# Patient Record
Sex: MALE | Race: WHITE | NOT HISPANIC OR LATINO
[De-identification: names, ages, dates, MRNs, and addresses within clinical notes are randomized per-mention and may not be internally consistent; named-entity substitution may affect disease eponyms.]

---

## 2017-01-04 ENCOUNTER — APPOINTMENT (OUTPATIENT)
Age: 37
End: 2017-01-04

## 2017-01-04 VITALS
HEART RATE: 68 BPM | DIASTOLIC BLOOD PRESSURE: 68 MMHG | HEIGHT: 60 IN | TEMPERATURE: 97.4 F | SYSTOLIC BLOOD PRESSURE: 120 MMHG | BODY MASS INDEX: 43.59 KG/M2 | WEIGHT: 222 LBS

## 2017-01-17 ENCOUNTER — APPOINTMENT (OUTPATIENT)
Dept: PODIATRY | Facility: HOSPITAL | Age: 37
End: 2017-01-17

## 2017-02-01 ENCOUNTER — APPOINTMENT (OUTPATIENT)
Age: 37
End: 2017-02-01

## 2017-03-15 ENCOUNTER — APPOINTMENT (OUTPATIENT)
Age: 37
End: 2017-03-15

## 2017-03-15 VITALS — BODY MASS INDEX: 43 KG/M2 | WEIGHT: 219 LBS | HEIGHT: 60 IN

## 2017-03-28 ENCOUNTER — APPOINTMENT (OUTPATIENT)
Dept: PODIATRY | Facility: HOSPITAL | Age: 37
End: 2017-03-28

## 2017-04-03 ENCOUNTER — APPOINTMENT (OUTPATIENT)
Age: 37
End: 2017-04-03

## 2017-04-03 VITALS
WEIGHT: 215 LBS | DIASTOLIC BLOOD PRESSURE: 80 MMHG | BODY MASS INDEX: 42.21 KG/M2 | HEIGHT: 60 IN | TEMPERATURE: 97.8 F | SYSTOLIC BLOOD PRESSURE: 108 MMHG | HEART RATE: 88 BPM

## 2017-04-10 LAB
ALBUMIN SERPL-MCNC: 3.8 G/DL
ALBUMIN/GLOB SERPL: 1.03
ALP SERPL-CCNC: 72 IU/L
ALT SERPL-CCNC: 50 IU/L
ANION GAP SERPL CALC-SCNC: 6 MMOL/L
AST SERPL-CCNC: 32 IU/L
BASOPHILS # BLD: 0.06 TH/MM3
BASOPHILS NFR BLD: 2.4 %
BILIRUB SERPL-MCNC: 0.5 MG/DL
BUN SERPL-MCNC: 15 MG/DL
BUN/CREAT SERPL: 16.7 %
CALCIUM SERPL-MCNC: 9.2 MG/DL
CHLORIDE SERPL-SCNC: 106 MMOL/L
CHOLEST SERPL-MCNC: 183 MG/DL
CO2 SERPL-SCNC: 29 MMOL/L
CREAT SERPL-MCNC: 0.9 MG/DL
EOSINOPHIL # BLD: 0.06 TH/MM3
EOSINOPHIL NFR BLD: 2.4 %
ERYTHROCYTE [DISTWIDTH] IN BLOOD BY AUTOMATED COUNT: 16.9 %
GFR SERPL CREATININE-BSD FRML MDRD: 95
GLUCOSE SERPL-MCNC: 92 MG/DL
GRANULOCYTES # BLD: 1.11 TH/MM3
GRANULOCYTES NFR BLD: 44.2 %
HCT VFR BLD AUTO: 49.8 %
HDLC SERPL-MCNC: 40 MG/DL
HDLC SERPL: 4.6
HGB BLD-MCNC: 16.2 G/DL
IMM GRANULOCYTES # BLD: 0.01 TH/MM3
IMM GRANULOCYTES NFR BLD: 0.4 %
LDLC SERPL DIRECT ASSAY-MCNC: 107 MG/DL
LYMPHOCYTES # BLD: 0.98 TH/MM3
LYMPHOCYTES NFR BLD: 39 %
MCH RBC QN AUTO: 28.5 PG
MCHC RBC AUTO-ENTMCNC: 32.5 G/DL
MCV RBC AUTO: 87.7 FL
MONOCYTES # BLD: 0.29 TH/MM3
MONOCYTES NFR BLD: 11.6 %
PLATELET # BLD: 241 TH/MM3
PMV BLD AUTO: 10.1 FL
POTASSIUM SERPL-SCNC: 4.6 MMOL/L
PROT SERPL-MCNC: 7.5 G/DL
RBC # BLD AUTO: 5.68 ML/MM3
SODIUM SERPL-SCNC: 141 MMOL/L
TRIGL SERPL-MCNC: 241 MG/DL
VLDLC SERPL-MCNC: 48 MG/DL
WBC # BLD: 2.51 TH/MM3

## 2017-04-12 LAB
25(OH)D3 SERPL-MCNC: 26 NG/ML
VITAMIN D2 SERPL-MCNC: 20 NG/ML
VITAMIN D3 SERPL-MCNC: 6 NG/ML

## 2017-05-24 ENCOUNTER — APPOINTMENT (OUTPATIENT)
Age: 37
End: 2017-05-24

## 2017-05-24 VITALS — HEIGHT: 60 IN | BODY MASS INDEX: 42.21 KG/M2 | WEIGHT: 215 LBS

## 2017-05-30 ENCOUNTER — RX RENEWAL (OUTPATIENT)
Age: 37
End: 2017-05-30

## 2017-06-16 ENCOUNTER — APPOINTMENT (OUTPATIENT)
Dept: GASTROENTEROLOGY | Facility: CLINIC | Age: 37
End: 2017-06-16

## 2017-06-16 ENCOUNTER — OUTPATIENT (OUTPATIENT)
Dept: OUTPATIENT SERVICES | Facility: HOSPITAL | Age: 37
LOS: 1 days | Discharge: HOME | End: 2017-06-16

## 2017-06-20 ENCOUNTER — APPOINTMENT (OUTPATIENT)
Dept: PODIATRY | Facility: HOSPITAL | Age: 37
End: 2017-06-20

## 2017-06-20 ENCOUNTER — OUTPATIENT (OUTPATIENT)
Dept: OUTPATIENT SERVICES | Facility: HOSPITAL | Age: 37
LOS: 1 days | Discharge: HOME | End: 2017-06-20

## 2017-06-26 ENCOUNTER — RX RENEWAL (OUTPATIENT)
Age: 37
End: 2017-06-26

## 2017-06-28 DIAGNOSIS — M79.674 PAIN IN RIGHT TOE(S): ICD-10-CM

## 2017-06-28 DIAGNOSIS — K62.5 HEMORRHAGE OF ANUS AND RECTUM: ICD-10-CM

## 2017-06-28 DIAGNOSIS — F79 UNSPECIFIED INTELLECTUAL DISABILITIES: ICD-10-CM

## 2017-06-28 DIAGNOSIS — M79.675 PAIN IN LEFT TOE(S): ICD-10-CM

## 2017-06-28 DIAGNOSIS — B35.1 TINEA UNGUIUM: ICD-10-CM

## 2017-07-05 ENCOUNTER — APPOINTMENT (OUTPATIENT)
Age: 37
End: 2017-07-05

## 2017-07-05 VITALS — WEIGHT: 216 LBS | BODY MASS INDEX: 42.41 KG/M2 | HEIGHT: 60 IN

## 2017-07-10 ENCOUNTER — APPOINTMENT (OUTPATIENT)
Age: 37
End: 2017-07-10

## 2017-07-19 ENCOUNTER — APPOINTMENT (OUTPATIENT)
Age: 37
End: 2017-07-19

## 2017-07-19 ENCOUNTER — OUTPATIENT (OUTPATIENT)
Dept: OUTPATIENT SERVICES | Facility: HOSPITAL | Age: 37
LOS: 1 days | Discharge: HOME | End: 2017-07-19

## 2017-07-19 VITALS
WEIGHT: 217 LBS | BODY MASS INDEX: 42.6 KG/M2 | HEIGHT: 60 IN | TEMPERATURE: 98.7 F | SYSTOLIC BLOOD PRESSURE: 100 MMHG | DIASTOLIC BLOOD PRESSURE: 64 MMHG | HEART RATE: 72 BPM

## 2017-07-19 DIAGNOSIS — E66.9 OBESITY, UNSPECIFIED: ICD-10-CM

## 2017-07-19 DIAGNOSIS — E78.5 HYPERLIPIDEMIA, UNSPECIFIED: ICD-10-CM

## 2017-07-19 DIAGNOSIS — R76.11 NONSPECIFIC REACTION TO TUBERCULIN SKIN TEST WITHOUT ACTIVE TUBERCULOSIS: ICD-10-CM

## 2017-07-19 DIAGNOSIS — K62.5 HEMORRHAGE OF ANUS AND RECTUM: ICD-10-CM

## 2017-07-19 DIAGNOSIS — E55.9 VITAMIN D DEFICIENCY, UNSPECIFIED: ICD-10-CM

## 2017-07-19 DIAGNOSIS — R79.89 OTHER SPECIFIED ABNORMAL FINDINGS OF BLOOD CHEMISTRY: ICD-10-CM

## 2017-07-19 DIAGNOSIS — Z00.00 ENCOUNTER FOR GENERAL ADULT MEDICAL EXAMINATION WITHOUT ABNORMAL FINDINGS: ICD-10-CM

## 2017-07-20 LAB
ALBUMIN SERPL-MCNC: 3.8 G/DL
ALBUMIN/GLOB SERPL: 1.09
ALP SERPL-CCNC: 90 IU/L
ALT SERPL-CCNC: 60 IU/L
ANION GAP SERPL CALC-SCNC: 7 MMOL/L
AST SERPL-CCNC: 28 IU/L
BASOPHILS # BLD: 0.06 TH/MM3
BASOPHILS NFR BLD: 2.1 %
BILIRUB SERPL-MCNC: 0.8 MG/DL
BUN SERPL-MCNC: 13 MG/DL
BUN/CREAT SERPL: 13.5 %
CALCIUM SERPL-MCNC: 9.1 MG/DL
CHLORIDE SERPL-SCNC: 102 MMOL/L
CHOLEST SERPL-MCNC: 184 MG/DL
CO2 SERPL-SCNC: 29 MMOL/L
CREAT SERPL-MCNC: 0.96 MG/DL
EOSINOPHIL # BLD: 0.07 TH/MM3
EOSINOPHIL NFR BLD: 2.4 %
ERYTHROCYTE [DISTWIDTH] IN BLOOD BY AUTOMATED COUNT: 16.7 %
ESTIMATED AVERAGE GLUCOSE (SOUTH): 117 MG/DL
GFR SERPL CREATININE-BSD FRML MDRD: 89
GLUCOSE SERPL-MCNC: 94 MG/DL
GRANULOCYTES # BLD: 1.38 TH/MM3
GRANULOCYTES NFR BLD: 47.5 %
HBA1C MFR BLD: 5.7 %
HCT VFR BLD AUTO: 49.3 %
HDLC SERPL-MCNC: 41 MG/DL
HDLC SERPL: 4.5
HGB BLD-MCNC: 16.3 G/DL
IMM GRANULOCYTES # BLD: 0.01 TH/MM3
IMM GRANULOCYTES NFR BLD: 0.3 %
LDLC SERPL DIRECT ASSAY-MCNC: 115 MG/DL
LYMPHOCYTES # BLD: 1.04 TH/MM3
LYMPHOCYTES NFR BLD: 35.7 %
MCH RBC QN AUTO: 29.1 PG
MCHC RBC AUTO-ENTMCNC: 33.1 G/DL
MCV RBC AUTO: 87.9 FL
MONOCYTES # BLD: 0.35 TH/MM3
MONOCYTES NFR BLD: 12 %
PLATELET # BLD: 230 TH/MM3
PMV BLD AUTO: 9.9 FL
POTASSIUM SERPL-SCNC: 4.3 MMOL/L
PROT SERPL-MCNC: 7.3 G/DL
RBC # BLD AUTO: 5.61 ML/MM3
SODIUM SERPL-SCNC: 138 MMOL/L
SUSPECT FLAGS (SOUTH): PRESENT
TRIGL SERPL-MCNC: 224 MG/DL
VLDLC SERPL-MCNC: 44 MG/DL
WBC # BLD: 2.91 TH/MM3

## 2017-07-21 LAB — AFP-TM SERPL-MCNC: 4 NG/ML

## 2017-07-24 LAB
25(OH)D3 SERPL-MCNC: 28 NG/ML
VITAMIN D2 SERPL-MCNC: 17 NG/ML
VITAMIN D3 SERPL-MCNC: 11 NG/ML

## 2017-07-25 LAB — THYROID STIM HORM-HS 3RD GEN (SOUTH): 1.37 UIU/ML

## 2017-08-01 ENCOUNTER — OUTPATIENT (OUTPATIENT)
Dept: OUTPATIENT SERVICES | Facility: HOSPITAL | Age: 37
LOS: 1 days | Discharge: HOME | End: 2017-08-01

## 2017-08-01 ENCOUNTER — APPOINTMENT (OUTPATIENT)
Age: 37
End: 2017-08-01

## 2017-08-01 VITALS
SYSTOLIC BLOOD PRESSURE: 125 MMHG | HEART RATE: 68 BPM | HEIGHT: 60 IN | WEIGHT: 215 LBS | BODY MASS INDEX: 42.21 KG/M2 | DIASTOLIC BLOOD PRESSURE: 76 MMHG | TEMPERATURE: 96.9 F

## 2017-08-01 DIAGNOSIS — H10.9 UNSPECIFIED CONJUNCTIVITIS: ICD-10-CM

## 2017-08-15 ENCOUNTER — OUTPATIENT (OUTPATIENT)
Dept: OUTPATIENT SERVICES | Facility: HOSPITAL | Age: 37
LOS: 1 days | Discharge: HOME | End: 2017-08-15

## 2017-08-15 ENCOUNTER — APPOINTMENT (OUTPATIENT)
Dept: PODIATRY | Facility: HOSPITAL | Age: 37
End: 2017-08-15

## 2017-08-16 DIAGNOSIS — F79 UNSPECIFIED INTELLECTUAL DISABILITIES: ICD-10-CM

## 2017-08-16 DIAGNOSIS — M72.2 PLANTAR FASCIAL FIBROMATOSIS: ICD-10-CM

## 2017-08-22 ENCOUNTER — APPOINTMENT (OUTPATIENT)
Dept: PODIATRY | Facility: HOSPITAL | Age: 37
End: 2017-08-22

## 2017-08-22 ENCOUNTER — OUTPATIENT (OUTPATIENT)
Dept: OUTPATIENT SERVICES | Facility: HOSPITAL | Age: 37
LOS: 1 days | Discharge: HOME | End: 2017-08-22

## 2017-08-22 DIAGNOSIS — M72.2 PLANTAR FASCIAL FIBROMATOSIS: ICD-10-CM

## 2017-08-23 DIAGNOSIS — M72.2 PLANTAR FASCIAL FIBROMATOSIS: ICD-10-CM

## 2017-08-23 DIAGNOSIS — F79 UNSPECIFIED INTELLECTUAL DISABILITIES: ICD-10-CM

## 2017-08-24 ENCOUNTER — APPOINTMENT (OUTPATIENT)
Dept: PODIATRY | Facility: HOSPITAL | Age: 37
End: 2017-08-24

## 2017-09-27 ENCOUNTER — APPOINTMENT (OUTPATIENT)
Age: 37
End: 2017-09-27

## 2017-10-13 ENCOUNTER — RESULT REVIEW (OUTPATIENT)
Age: 37
End: 2017-10-13

## 2017-10-13 ENCOUNTER — APPOINTMENT (OUTPATIENT)
Dept: GASTROENTEROLOGY | Facility: CLINIC | Age: 37
End: 2017-10-13

## 2017-10-13 ENCOUNTER — OUTPATIENT (OUTPATIENT)
Dept: OUTPATIENT SERVICES | Facility: HOSPITAL | Age: 37
LOS: 1 days | Discharge: HOME | End: 2017-10-13

## 2017-10-13 VITALS
SYSTOLIC BLOOD PRESSURE: 110 MMHG | WEIGHT: 218 LBS | BODY MASS INDEX: 42.8 KG/M2 | HEIGHT: 60 IN | DIASTOLIC BLOOD PRESSURE: 79 MMHG | HEART RATE: 92 BPM

## 2017-10-13 DIAGNOSIS — K62.5 HEMORRHAGE OF ANUS AND RECTUM: ICD-10-CM

## 2017-10-16 ENCOUNTER — OUTPATIENT (OUTPATIENT)
Dept: OUTPATIENT SERVICES | Facility: HOSPITAL | Age: 37
LOS: 1 days | Discharge: HOME | End: 2017-10-16

## 2017-10-16 ENCOUNTER — APPOINTMENT (OUTPATIENT)
Age: 37
End: 2017-10-16

## 2017-10-16 VITALS
SYSTOLIC BLOOD PRESSURE: 104 MMHG | HEART RATE: 68 BPM | HEIGHT: 60 IN | WEIGHT: 218 LBS | DIASTOLIC BLOOD PRESSURE: 74 MMHG | BODY MASS INDEX: 42.8 KG/M2 | TEMPERATURE: 98.5 F

## 2017-10-16 DIAGNOSIS — E55.9 VITAMIN D DEFICIENCY, UNSPECIFIED: ICD-10-CM

## 2017-10-16 DIAGNOSIS — E78.5 HYPERLIPIDEMIA, UNSPECIFIED: ICD-10-CM

## 2017-10-16 DIAGNOSIS — Z23 ENCOUNTER FOR IMMUNIZATION: ICD-10-CM

## 2017-10-16 DIAGNOSIS — E66.9 OBESITY, UNSPECIFIED: ICD-10-CM

## 2017-10-16 RX ORDER — LORATADINE 10 MG/1
10 TABLET ORAL
Qty: 30 | Refills: 0 | Status: DISCONTINUED | COMMUNITY
Start: 2017-04-20 | End: 2017-10-16

## 2017-10-16 RX ORDER — GENTAMICIN SULFATE 3 MG/ML
0.3 SOLUTION OPHTHALMIC
Qty: 1 | Refills: 0 | Status: DISCONTINUED | COMMUNITY
Start: 2017-08-01 | End: 2017-10-16

## 2017-10-17 ENCOUNTER — APPOINTMENT (OUTPATIENT)
Dept: PODIATRY | Facility: HOSPITAL | Age: 37
End: 2017-10-17

## 2017-10-17 ENCOUNTER — OUTPATIENT (OUTPATIENT)
Dept: OUTPATIENT SERVICES | Facility: HOSPITAL | Age: 37
LOS: 1 days | Discharge: HOME | End: 2017-10-17

## 2017-10-17 DIAGNOSIS — F79 UNSPECIFIED INTELLECTUAL DISABILITIES: ICD-10-CM

## 2017-10-17 DIAGNOSIS — B35.1 TINEA UNGUIUM: ICD-10-CM

## 2017-10-27 ENCOUNTER — OUTPATIENT (OUTPATIENT)
Dept: OUTPATIENT SERVICES | Facility: HOSPITAL | Age: 37
LOS: 1 days | Discharge: HOME | End: 2017-10-27

## 2017-10-31 ENCOUNTER — RESULT REVIEW (OUTPATIENT)
Age: 37
End: 2017-10-31

## 2017-11-24 LAB
ANA TITR SER: NEGATIVE
CERULOPLASMIN SERPL-MCNC: 28 MG/DL
FERRITIN SERPL-MCNC: 375 NG/ML
HAV IGM SER-ACNC: NONREACTIVE
HBV CORE IGM SER-ACNC: NONREACTIVE
HBV SURFACE AG SER-ACNC: NONREACTIVE
HCV AB S/CO SERPL IA: 0.23 S/CO
HCV AB SER QL: NONREACTIVE
MYELOPEROXIDASE AB SER IA-ACNC: < 1
PROTEINASE3 AB SER IA-ACNC: < 1
SMOOTH MUSCLE AB SER QL IF: NEGATIVE

## 2017-12-12 ENCOUNTER — OUTPATIENT (OUTPATIENT)
Dept: OUTPATIENT SERVICES | Facility: HOSPITAL | Age: 37
LOS: 1 days | Discharge: HOME | End: 2017-12-12

## 2017-12-12 DIAGNOSIS — H50.32 INTERMITTENT ALTERNATING ESOTROPIA: ICD-10-CM

## 2018-01-10 ENCOUNTER — OUTPATIENT (OUTPATIENT)
Dept: OUTPATIENT SERVICES | Facility: HOSPITAL | Age: 38
LOS: 1 days | Discharge: HOME | End: 2018-01-10

## 2018-01-10 ENCOUNTER — APPOINTMENT (OUTPATIENT)
Age: 38
End: 2018-01-10

## 2018-01-10 VITALS
BODY MASS INDEX: 43.19 KG/M2 | TEMPERATURE: 98.7 F | HEART RATE: 80 BPM | HEIGHT: 60 IN | DIASTOLIC BLOOD PRESSURE: 78 MMHG | WEIGHT: 220 LBS | SYSTOLIC BLOOD PRESSURE: 102 MMHG

## 2018-01-10 DIAGNOSIS — E78.5 HYPERLIPIDEMIA, UNSPECIFIED: ICD-10-CM

## 2018-01-10 DIAGNOSIS — E66.9 OBESITY, UNSPECIFIED: ICD-10-CM

## 2018-01-10 DIAGNOSIS — E55.9 VITAMIN D DEFICIENCY, UNSPECIFIED: ICD-10-CM

## 2018-01-11 ENCOUNTER — APPOINTMENT (OUTPATIENT)
Dept: PODIATRY | Facility: HOSPITAL | Age: 38
End: 2018-01-11

## 2018-01-11 ENCOUNTER — OUTPATIENT (OUTPATIENT)
Dept: OUTPATIENT SERVICES | Facility: HOSPITAL | Age: 38
LOS: 1 days | Discharge: HOME | End: 2018-01-11

## 2018-01-11 DIAGNOSIS — B35.1 TINEA UNGUIUM: ICD-10-CM

## 2018-01-11 DIAGNOSIS — L85.3 XEROSIS CUTIS: ICD-10-CM

## 2018-01-11 DIAGNOSIS — F79 UNSPECIFIED INTELLECTUAL DISABILITIES: ICD-10-CM

## 2018-01-17 ENCOUNTER — APPOINTMENT (OUTPATIENT)
Age: 38
End: 2018-01-17

## 2018-01-17 VITALS — WEIGHT: 224 LBS | BODY MASS INDEX: 43.75 KG/M2

## 2018-01-19 ENCOUNTER — OUTPATIENT (OUTPATIENT)
Dept: OUTPATIENT SERVICES | Facility: HOSPITAL | Age: 38
LOS: 1 days | Discharge: HOME | End: 2018-01-19

## 2018-01-19 ENCOUNTER — APPOINTMENT (OUTPATIENT)
Dept: GASTROENTEROLOGY | Facility: CLINIC | Age: 38
End: 2018-01-19

## 2018-01-19 VITALS
SYSTOLIC BLOOD PRESSURE: 113 MMHG | WEIGHT: 222 LBS | DIASTOLIC BLOOD PRESSURE: 65 MMHG | BODY MASS INDEX: 43.36 KG/M2 | HEART RATE: 83 BPM

## 2018-01-24 LAB
ALBUMIN SERPL-MCNC: 3.6 G/DL
ALBUMIN/GLOB SERPL: 1.03
ALP SERPL-CCNC: 74 IU/L
ALT SERPL-CCNC: 53 IU/L
ANION GAP SERPL CALC-SCNC: 9 MMOL/L
AST SERPL-CCNC: 27 IU/L
BASOPHILS # BLD: 0.06 TH/MM3
BASOPHILS NFR BLD: 2 %
BILIRUB SERPL-MCNC: 0.7 MG/DL
BUN SERPL-MCNC: 13 MG/DL
BUN/CREAT SERPL: 14 %
CALCIUM SERPL-MCNC: 9.1 MG/DL
CHLORIDE SERPL-SCNC: 104 MMOL/L
CHOLEST SERPL-MCNC: 163 MG/DL
CO2 SERPL-SCNC: 27 MMOL/L
CREAT SERPL-MCNC: 0.93 MG/DL
EOSINOPHIL # BLD: 0.07 TH/MM3
EOSINOPHIL NFR BLD: 2.3 %
ERYTHROCYTE [DISTWIDTH] IN BLOOD BY AUTOMATED COUNT: 16.1 %
GFR SERPL CREATININE-BSD FRML MDRD: 91
GLUCOSE SERPL-MCNC: 102 MG/DL
GRANULOCYTES # BLD: 1.32 TH/MM3
GRANULOCYTES NFR BLD: 43.4 %
HCT VFR BLD AUTO: 49.3 %
HDLC SERPL-MCNC: 39 MG/DL
HDLC SERPL: 4.2
HGB BLD-MCNC: 16.2 G/DL
IMM GRANULOCYTES # BLD: 0.02 TH/MM3
IMM GRANULOCYTES NFR BLD: 0.7 %
LDLC SERPL DIRECT ASSAY-MCNC: 104 MG/DL
LYMPHOCYTES # BLD: 1.18 TH/MM3
LYMPHOCYTES NFR BLD: 38.8 %
MCH RBC QN AUTO: 28.7 PG
MCHC RBC AUTO-ENTMCNC: 32.9 G/DL
MCV RBC AUTO: 87.3 FL
MONOCYTES # BLD: 0.39 TH/MM3
MONOCYTES NFR BLD: 12.8 %
PLATELET # BLD: 225 TH/MM3
PMV BLD AUTO: 9.8 FL
POTASSIUM SERPL-SCNC: 4.5 MMOL/L
PROT SERPL-MCNC: 7.1 G/DL
RBC # BLD AUTO: 5.65 ML/MM3
SODIUM SERPL-SCNC: 140 MMOL/L
TRIGL SERPL-MCNC: 184 MG/DL
VLDLC SERPL-MCNC: 36 MG/DL
WBC # BLD: 3.04 TH/MM3

## 2018-03-19 ENCOUNTER — OUTPATIENT (OUTPATIENT)
Dept: OUTPATIENT SERVICES | Facility: HOSPITAL | Age: 38
LOS: 1 days | Discharge: HOME | End: 2018-03-19

## 2018-03-19 DIAGNOSIS — R79.89 OTHER SPECIFIED ABNORMAL FINDINGS OF BLOOD CHEMISTRY: ICD-10-CM

## 2018-03-19 DIAGNOSIS — K62.5 HEMORRHAGE OF ANUS AND RECTUM: ICD-10-CM

## 2018-03-20 ENCOUNTER — APPOINTMENT (OUTPATIENT)
Dept: PODIATRY | Facility: HOSPITAL | Age: 38
End: 2018-03-20
Payer: MEDICAID

## 2018-03-20 ENCOUNTER — OUTPATIENT (OUTPATIENT)
Dept: OUTPATIENT SERVICES | Facility: HOSPITAL | Age: 38
LOS: 1 days | Discharge: HOME | End: 2018-03-20

## 2018-03-20 PROCEDURE — 11721 DEBRIDE NAIL 6 OR MORE: CPT | Mod: NC

## 2018-03-21 DIAGNOSIS — Q90.9 DOWN SYNDROME, UNSPECIFIED: ICD-10-CM

## 2018-03-21 DIAGNOSIS — B35.1 TINEA UNGUIUM: ICD-10-CM

## 2018-03-23 ENCOUNTER — OUTPATIENT (OUTPATIENT)
Dept: OUTPATIENT SERVICES | Facility: HOSPITAL | Age: 38
LOS: 1 days | Discharge: HOME | End: 2018-03-23

## 2018-03-23 ENCOUNTER — APPOINTMENT (OUTPATIENT)
Dept: GASTROENTEROLOGY | Facility: CLINIC | Age: 38
End: 2018-03-23

## 2018-03-23 VITALS
HEIGHT: 60 IN | DIASTOLIC BLOOD PRESSURE: 73 MMHG | WEIGHT: 222 LBS | SYSTOLIC BLOOD PRESSURE: 110 MMHG | BODY MASS INDEX: 43.59 KG/M2 | HEART RATE: 78 BPM

## 2018-03-23 DIAGNOSIS — Z78.9 OTHER SPECIFIED HEALTH STATUS: ICD-10-CM

## 2018-04-10 ENCOUNTER — OUTPATIENT (OUTPATIENT)
Dept: OUTPATIENT SERVICES | Facility: HOSPITAL | Age: 38
LOS: 1 days | Discharge: HOME | End: 2018-04-10

## 2018-04-10 ENCOUNTER — APPOINTMENT (OUTPATIENT)
Age: 38
End: 2018-04-10

## 2018-04-10 VITALS
DIASTOLIC BLOOD PRESSURE: 76 MMHG | HEIGHT: 60 IN | HEART RATE: 68 BPM | SYSTOLIC BLOOD PRESSURE: 128 MMHG | BODY MASS INDEX: 43.78 KG/M2 | TEMPERATURE: 97.8 F | WEIGHT: 223 LBS

## 2018-04-10 DIAGNOSIS — E55.9 VITAMIN D DEFICIENCY, UNSPECIFIED: ICD-10-CM

## 2018-04-10 DIAGNOSIS — E78.5 HYPERLIPIDEMIA, UNSPECIFIED: ICD-10-CM

## 2018-04-10 DIAGNOSIS — K62.5 HEMORRHAGE OF ANUS AND RECTUM: ICD-10-CM

## 2018-04-10 DIAGNOSIS — E66.9 OBESITY, UNSPECIFIED: ICD-10-CM

## 2018-04-10 RX ORDER — POLYETHYLENE GLYCOL 3350 AND ELECTROLYTES WITH LEMON FLAVOR 236; 22.74; 6.74; 5.86; 2.97 G/4L; G/4L; G/4L; G/4L; G/4L
236 POWDER, FOR SOLUTION ORAL
Qty: 1 | Refills: 0 | Status: DISCONTINUED | COMMUNITY
Start: 2018-03-23 | End: 2018-04-10

## 2018-05-04 ENCOUNTER — OUTPATIENT (OUTPATIENT)
Dept: OUTPATIENT SERVICES | Facility: HOSPITAL | Age: 38
LOS: 1 days | Discharge: HOME | End: 2018-05-04

## 2018-05-10 ENCOUNTER — LABORATORY RESULT (OUTPATIENT)
Age: 38
End: 2018-05-10

## 2018-05-13 LAB
25(OH)D3 SERPL-MCNC: 15 NG/ML
BASOPHILS # BLD AUTO: 0.08 K/UL
BASOPHILS NFR BLD AUTO: 3 %
EOSINOPHIL # BLD AUTO: 0.08 K/UL
EOSINOPHIL NFR BLD AUTO: 3 %
HCT VFR BLD CALC: 51.9 %
HGB BLD-MCNC: 16.9 G/DL
IMM GRANULOCYTES NFR BLD AUTO: 1.1 %
LYMPHOCYTES # BLD AUTO: 0.99 K/UL
LYMPHOCYTES NFR BLD AUTO: 36.9 %
MAN DIFF?: NORMAL
MCHC RBC-ENTMCNC: 29.1 PG
MCHC RBC-ENTMCNC: 32.6 G/DL
MCV RBC AUTO: 89.5 FL
MONOCYTES # BLD AUTO: 0.31 K/UL
MONOCYTES NFR BLD AUTO: 11.6 %
NEUTROPHILS # BLD AUTO: 1.19 K/UL
NEUTROPHILS NFR BLD AUTO: 44.4 %
PLATELET # BLD AUTO: 219 K/UL
RBC # BLD: 5.8 M/UL
RBC # FLD: 17.2 %
WBC # FLD AUTO: 2.68 K/UL

## 2018-05-24 ENCOUNTER — APPOINTMENT (OUTPATIENT)
Dept: PODIATRY | Facility: HOSPITAL | Age: 38
End: 2018-05-24
Payer: MEDICAID

## 2018-05-24 ENCOUNTER — OUTPATIENT (OUTPATIENT)
Dept: OUTPATIENT SERVICES | Facility: HOSPITAL | Age: 38
LOS: 1 days | Discharge: HOME | End: 2018-05-24

## 2018-05-24 PROCEDURE — 11721 DEBRIDE NAIL 6 OR MORE: CPT | Mod: NC

## 2018-05-29 DIAGNOSIS — B35.1 TINEA UNGUIUM: ICD-10-CM

## 2018-05-29 DIAGNOSIS — F79 UNSPECIFIED INTELLECTUAL DISABILITIES: ICD-10-CM

## 2018-06-05 ENCOUNTER — APPOINTMENT (OUTPATIENT)
Dept: PODIATRY | Facility: HOSPITAL | Age: 38
End: 2018-06-05

## 2018-07-02 ENCOUNTER — APPOINTMENT (OUTPATIENT)
Age: 38
End: 2018-07-02

## 2018-07-18 ENCOUNTER — APPOINTMENT (OUTPATIENT)
Dept: NUTRITION | Facility: HOSPITAL | Age: 38
End: 2018-07-18

## 2018-07-18 VITALS — BODY MASS INDEX: 43.78 KG/M2 | WEIGHT: 223 LBS | HEIGHT: 60 IN

## 2018-07-19 ENCOUNTER — APPOINTMENT (OUTPATIENT)
Age: 38
End: 2018-07-19

## 2018-07-19 ENCOUNTER — OUTPATIENT (OUTPATIENT)
Dept: OUTPATIENT SERVICES | Facility: HOSPITAL | Age: 38
LOS: 1 days | Discharge: HOME | End: 2018-07-19

## 2018-07-19 VITALS
BODY MASS INDEX: 43.59 KG/M2 | WEIGHT: 222 LBS | HEIGHT: 60 IN | HEART RATE: 68 BPM | DIASTOLIC BLOOD PRESSURE: 79 MMHG | TEMPERATURE: 97.3 F | SYSTOLIC BLOOD PRESSURE: 128 MMHG

## 2018-07-19 DIAGNOSIS — R94.5 ABNORMAL RESULTS OF LIVER FUNCTION STUDIES: ICD-10-CM

## 2018-07-19 DIAGNOSIS — E66.9 OBESITY, UNSPECIFIED: ICD-10-CM

## 2018-07-19 DIAGNOSIS — E78.5 HYPERLIPIDEMIA, UNSPECIFIED: ICD-10-CM

## 2018-07-19 DIAGNOSIS — H10.9 UNSPECIFIED CONJUNCTIVITIS: ICD-10-CM

## 2018-07-19 DIAGNOSIS — D70.9 NEUTROPENIA, UNSPECIFIED: ICD-10-CM

## 2018-07-19 DIAGNOSIS — E55.9 VITAMIN D DEFICIENCY, UNSPECIFIED: ICD-10-CM

## 2018-07-19 NOTE — PHYSICAL EXAM
[No Respiratory Distress] : no respiratory distress  [Clear to Auscultation] : lungs were clear to auscultation bilaterally [No Accessory Muscle Use] : no accessory muscle use [Normal Rate] : normal rate  [Regular Rhythm] : with a regular rhythm [Normal S1, S2] : normal S1 and S2 [No Murmur] : no murmur heard [No Carotid Bruits] : no carotid bruits [No Abdominal Bruit] : a ~M bruit was not heard ~T in the abdomen [Pedal Pulses Present] : the pedal pulses are present [No Edema] : there was no peripheral edema [No Extremity Clubbing/Cyanosis] : no extremity clubbing/cyanosis [No Palpable Aorta] : no palpable aorta [Soft] : abdomen soft [Non Tender] : non-tender [Non-distended] : non-distended [No Masses] : no abdominal mass palpated [No HSM] : no HSM [Normal Bowel Sounds] : normal bowel sounds [No Joint Swelling] : no joint swelling [Grossly Normal Strength/Tone] : grossly normal strength/tone [de-identified] : obese abdomen

## 2018-07-19 NOTE — ASSESSMENT
[FreeTextEntry1] : 01.  Chronic stable neutropenia: WBC 2.68, no sign of infection, likely secondary to med.\par a.  continue to monitor CBC\par b.  hematology referral\par 02.  h/o rectal bleed: H/H stable 16.9/51.9, no recurrent rectal bleed reported by staff.  Was seen by GI, pt's brother declined colonoscopy unless H/H drop\par a.  observe\par 03.  Hyperlipidemia/obesity: on atorvastatin, fish oil\par a.  continue atorvastatin and diet\par b.  weight loss, exercise as tolerated\par c.  enforce diet\par d.  follow dietitian as scheduled\par e.  follow up visit in 3 months\par 04.  vitamin d insufficiency: vitamin d still low despite vitamin d supplement [id number] once monthly\par a.  increase vitamin d [id number] to once weekly

## 2018-07-19 NOTE — COUNSELING
[Weight management counseling provided] : Weight management [Healthy eating counseling provided] : healthy eating [Activity counseling provided] : activity [Low Fat Diet] : Low fat diet [Decrease Portions] : Decrease food portions [Low Salt Diet] : Low salt diet [Walking] : Walking [de-identified] : 1500 calories low fat/chol high fiber mechanical soft 1/4' diet

## 2018-07-19 NOTE — HISTORY OF PRESENT ILLNESS
[Other: _____] : [unfilled] [FreeTextEntry1] : Pt.is a 38yo male, here for follow up.  Pt. has chronic stable neutropenia, no sign of infection.  No cough, no shortness of breath, no dysuria, no urinary frequency/urgency, no n/v/diarrhea.  No issues reported by staff.

## 2018-07-25 ENCOUNTER — RX RENEWAL (OUTPATIENT)
Age: 38
End: 2018-07-25

## 2018-07-25 ENCOUNTER — RESULT CHARGE (OUTPATIENT)
Age: 38
End: 2018-07-25

## 2018-07-25 ENCOUNTER — APPOINTMENT (OUTPATIENT)
Dept: HEMATOLOGY ONCOLOGY | Facility: CLINIC | Age: 38
End: 2018-07-25

## 2018-07-25 DIAGNOSIS — Z86.39 PERSONAL HISTORY OF OTHER ENDOCRINE, NUTRITIONAL AND METABOLIC DISEASE: ICD-10-CM

## 2018-07-25 DIAGNOSIS — Q90.9 DOWN SYNDROME, UNSPECIFIED: ICD-10-CM

## 2018-07-26 ENCOUNTER — APPOINTMENT (OUTPATIENT)
Dept: HEMATOLOGY ONCOLOGY | Facility: CLINIC | Age: 38
End: 2018-07-26

## 2018-07-26 ENCOUNTER — APPOINTMENT (OUTPATIENT)
Dept: PODIATRY | Facility: HOSPITAL | Age: 38
End: 2018-07-26

## 2018-07-26 ENCOUNTER — LABORATORY RESULT (OUTPATIENT)
Age: 38
End: 2018-07-26

## 2018-08-08 ENCOUNTER — APPOINTMENT (OUTPATIENT)
Dept: HEMATOLOGY ONCOLOGY | Facility: CLINIC | Age: 38
End: 2018-08-08

## 2018-08-08 VITALS
HEIGHT: 60 IN | TEMPERATURE: 96.9 F | DIASTOLIC BLOOD PRESSURE: 71 MMHG | WEIGHT: 222 LBS | BODY MASS INDEX: 43.59 KG/M2 | SYSTOLIC BLOOD PRESSURE: 104 MMHG | HEART RATE: 84 BPM

## 2018-08-22 ENCOUNTER — APPOINTMENT (OUTPATIENT)
Dept: HEMATOLOGY ONCOLOGY | Facility: CLINIC | Age: 38
End: 2018-08-22

## 2018-08-22 ENCOUNTER — OUTPATIENT (OUTPATIENT)
Dept: OUTPATIENT SERVICES | Facility: HOSPITAL | Age: 38
LOS: 1 days | Discharge: HOME | End: 2018-08-22

## 2018-08-22 DIAGNOSIS — D70.9 NEUTROPENIA, UNSPECIFIED: ICD-10-CM

## 2018-09-07 ENCOUNTER — APPOINTMENT (OUTPATIENT)
Dept: HEMATOLOGY ONCOLOGY | Facility: CLINIC | Age: 38
End: 2018-09-07

## 2018-09-11 ENCOUNTER — APPOINTMENT (OUTPATIENT)
Dept: HEMATOLOGY ONCOLOGY | Facility: CLINIC | Age: 38
End: 2018-09-11

## 2018-09-11 ENCOUNTER — OUTPATIENT (OUTPATIENT)
Dept: OUTPATIENT SERVICES | Facility: HOSPITAL | Age: 38
LOS: 1 days | Discharge: HOME | End: 2018-09-11

## 2018-09-11 VITALS
SYSTOLIC BLOOD PRESSURE: 112 MMHG | DIASTOLIC BLOOD PRESSURE: 80 MMHG | HEART RATE: 70 BPM | WEIGHT: 222 LBS | TEMPERATURE: 97.8 F | HEIGHT: 60 IN | BODY MASS INDEX: 43.59 KG/M2

## 2018-09-11 DIAGNOSIS — D70.9 NEUTROPENIA, UNSPECIFIED: ICD-10-CM

## 2018-09-18 ENCOUNTER — OUTPATIENT (OUTPATIENT)
Dept: OUTPATIENT SERVICES | Facility: HOSPITAL | Age: 38
LOS: 1 days | Discharge: HOME | End: 2018-09-18

## 2018-09-18 ENCOUNTER — APPOINTMENT (OUTPATIENT)
Dept: PODIATRY | Facility: HOSPITAL | Age: 38
End: 2018-09-18
Payer: MEDICAID

## 2018-09-18 DIAGNOSIS — B35.1 TINEA UNGUIUM: ICD-10-CM

## 2018-09-18 DIAGNOSIS — M79.675 PAIN IN LEFT TOE(S): ICD-10-CM

## 2018-09-18 DIAGNOSIS — M79.674 PAIN IN RIGHT TOE(S): ICD-10-CM

## 2018-09-18 PROCEDURE — 11721 DEBRIDE NAIL 6 OR MORE: CPT | Mod: NC

## 2018-09-20 DIAGNOSIS — D72.819 DECREASED WHITE BLOOD CELL COUNT, UNSPECIFIED: ICD-10-CM

## 2018-09-23 LAB
ALBUMIN SERPL ELPH-MCNC: 4.3 G/DL
ALP BLD-CCNC: 86 U/L
ALT SERPL-CCNC: 43 U/L
ANA SER IF-ACNC: NEGATIVE
ANION GAP SERPL CALC-SCNC: 11 MMOL/L
AST SERPL-CCNC: 26 U/L
BILIRUB DIRECT SERPL-MCNC: <0.2 MG/DL
BILIRUB INDIRECT SERPL-MCNC: >0.2 MG/DL
BILIRUB SERPL-MCNC: 0.4 MG/DL
BUN SERPL-MCNC: 14 MG/DL
CALCIUM SERPL-MCNC: 9.2 MG/DL
CHLORIDE SERPL-SCNC: 104 MMOL/L
CO2 SERPL-SCNC: 30 MMOL/L
CREAT SERPL-MCNC: 1.1 MG/DL
FOLATE RBC-MCNC: 1939 NG/ML
GLUCOSE SERPL-MCNC: 88 MG/DL
HBV CORE IGG+IGM SER QL: NONREACTIVE
HBV SURFACE AB SER QL: NONREACTIVE
HBV SURFACE AG SER QL: NONREACTIVE
HCT VFR BLD CALC: 50.8 %
HCT VFR BLD CALC: 55 %
HCV AB SER QL: NONREACTIVE
HCV S/CO RATIO: 0.29 S/CO
HGB BLD-MCNC: 16.8 G/DL
HIV1 RNA # SERPL NAA+PROBE: NORMAL
HIV1 RNA # SERPL NAA+PROBE: NOT DETECTED COPIES/ML
MCHC RBC-ENTMCNC: 28.8 PG
MCHC RBC-ENTMCNC: 33.1 G/DL
MCV RBC AUTO: 87.1 FL
PLATELET # BLD AUTO: 214 K/UL
PMV BLD: 9.1 FL
POTASSIUM SERPL-SCNC: 4.5 MMOL/L
PROT SERPL-MCNC: 7.4 G/DL
RBC # BLD: 5.83 M/UL
RBC # FLD: 15.8 %
RHEUMATOID FACT SER QL: <10 IU/ML
SODIUM SERPL-SCNC: 145 MMOL/L
VIRAL LOAD LOG: NOT DETECTED LG COP/ML
VIT B12 SERPL-MCNC: 306 PG/ML
WBC # FLD AUTO: 2.37 K/UL

## 2018-10-17 ENCOUNTER — RX RENEWAL (OUTPATIENT)
Age: 38
End: 2018-10-17

## 2018-10-23 ENCOUNTER — CHART COPY (OUTPATIENT)
Age: 38
End: 2018-10-23

## 2018-10-23 ENCOUNTER — APPOINTMENT (OUTPATIENT)
Age: 38
End: 2018-10-23

## 2018-10-23 ENCOUNTER — OUTPATIENT (OUTPATIENT)
Dept: OUTPATIENT SERVICES | Facility: HOSPITAL | Age: 38
LOS: 1 days | Discharge: HOME | End: 2018-10-23

## 2018-10-23 VITALS
HEART RATE: 80 BPM | HEIGHT: 60 IN | WEIGHT: 221 LBS | SYSTOLIC BLOOD PRESSURE: 130 MMHG | TEMPERATURE: 97.5 F | BODY MASS INDEX: 43.39 KG/M2 | DIASTOLIC BLOOD PRESSURE: 80 MMHG

## 2018-10-23 DIAGNOSIS — Z23 ENCOUNTER FOR IMMUNIZATION: ICD-10-CM

## 2018-10-23 DIAGNOSIS — D70.9 NEUTROPENIA, UNSPECIFIED: ICD-10-CM

## 2018-10-23 DIAGNOSIS — E55.9 VITAMIN D DEFICIENCY, UNSPECIFIED: ICD-10-CM

## 2018-10-23 DIAGNOSIS — E66.9 OBESITY, UNSPECIFIED: ICD-10-CM

## 2018-10-23 DIAGNOSIS — E78.5 HYPERLIPIDEMIA, UNSPECIFIED: ICD-10-CM

## 2018-10-23 NOTE — PHYSICAL EXAM
[No Respiratory Distress] : no respiratory distress  [Clear to Auscultation] : lungs were clear to auscultation bilaterally [No Accessory Muscle Use] : no accessory muscle use [Normal Rate] : normal rate  [Regular Rhythm] : with a regular rhythm [Normal S1, S2] : normal S1 and S2 [No Murmur] : no murmur heard [No Carotid Bruits] : no carotid bruits [No Abdominal Bruit] : a ~M bruit was not heard ~T in the abdomen [Pedal Pulses Present] : the pedal pulses are present [No Edema] : there was no peripheral edema [No Extremity Clubbing/Cyanosis] : no extremity clubbing/cyanosis [No Palpable Aorta] : no palpable aorta [Soft] : abdomen soft [Non Tender] : non-tender [Non-distended] : non-distended [No Masses] : no abdominal mass palpated [No HSM] : no HSM [Normal Bowel Sounds] : normal bowel sounds [No Joint Swelling] : no joint swelling [Grossly Normal Strength/Tone] : grossly normal strength/tone [de-identified] : obese abdomen

## 2018-10-23 NOTE — HISTORY OF PRESENT ILLNESS
[Other: _____] : [unfilled] [FreeTextEntry1] : Pt. is a 38yo male, here for follow up.  Pt. unable to offer any history or complaint due to his cognitive impairment.  No issues reported by staff.

## 2018-10-23 NOTE — ASSESSMENT
[FreeTextEntry1] : 01.  Hyperlipidemia/obesity: on atorvastatin and fish oil\par a.  continue current med. and diet\par b.  fasting CMP, CBC, lipid profile, vitamin d\par c.  weight loss, exercise as tolerated\par d.  follow up visit in 3 months\par e.  follow dietitian as scheduled\par 02.  Neutropenia: hematology note appreciate\par a.  follow up with hematologist\par 03.  Vitamin d insufficiency: on vitamin d supplement\par 04.  HCM\par a.  flu vaccine ordered

## 2018-10-31 LAB
25(OH)D3 SERPL-MCNC: 19 NG/ML
ALBUMIN SERPL ELPH-MCNC: 4.1 G/DL
ALP BLD-CCNC: 80 U/L
ALT SERPL-CCNC: 44 U/L
ANION GAP SERPL CALC-SCNC: 16 MMOL/L
AST SERPL-CCNC: 26 U/L
BASOPHILS # BLD AUTO: 0.05 K/UL
BASOPHILS NFR BLD AUTO: 1.7 %
BILIRUB SERPL-MCNC: 0.3 MG/DL
BUN SERPL-MCNC: 13 MG/DL
CALCIUM SERPL-MCNC: 9.1 MG/DL
CHLORIDE SERPL-SCNC: 103 MMOL/L
CHOLEST SERPL-MCNC: 160 MG/DL
CHOLEST/HDLC SERPL: 4 RATIO
CO2 SERPL-SCNC: 22 MMOL/L
CREAT SERPL-MCNC: 1 MG/DL
EOSINOPHIL # BLD AUTO: 0.06 K/UL
EOSINOPHIL NFR BLD AUTO: 2 %
GLUCOSE SERPL-MCNC: 93 MG/DL
HCT VFR BLD CALC: 51.8 %
HDLC SERPL-MCNC: 40 MG/DL
HGB BLD-MCNC: 16.6 G/DL
IMM GRANULOCYTES NFR BLD AUTO: 0.3 %
LDLC SERPL CALC-MCNC: 101 MG/DL
LYMPHOCYTES # BLD AUTO: 1 K/UL
LYMPHOCYTES NFR BLD AUTO: 34.1 %
MAN DIFF?: NORMAL
MCHC RBC-ENTMCNC: 28.4 PG
MCHC RBC-ENTMCNC: 32 G/DL
MCV RBC AUTO: 88.7 FL
MONOCYTES # BLD AUTO: 0.38 K/UL
MONOCYTES NFR BLD AUTO: 13 %
NEUTROPHILS # BLD AUTO: 1.43 K/UL
NEUTROPHILS NFR BLD AUTO: 48.9 %
PLATELET # BLD AUTO: 240 K/UL
POTASSIUM SERPL-SCNC: 4.4 MMOL/L
PROT SERPL-MCNC: 7.2 G/DL
RBC # BLD: 5.84 M/UL
RBC # FLD: 17.1 %
SODIUM SERPL-SCNC: 141 MMOL/L
TRIGL SERPL-MCNC: 184 MG/DL
WBC # FLD AUTO: 2.93 K/UL

## 2018-11-08 ENCOUNTER — APPOINTMENT (OUTPATIENT)
Dept: PODIATRY | Facility: HOSPITAL | Age: 38
End: 2018-11-08

## 2018-11-08 ENCOUNTER — OUTPATIENT (OUTPATIENT)
Dept: OUTPATIENT SERVICES | Facility: HOSPITAL | Age: 38
LOS: 1 days | Discharge: HOME | End: 2018-11-08
Payer: MEDICAID

## 2018-11-08 DIAGNOSIS — M79.675 PAIN IN LEFT TOE(S): ICD-10-CM

## 2018-11-08 DIAGNOSIS — B35.1 TINEA UNGUIUM: ICD-10-CM

## 2018-11-08 DIAGNOSIS — M79.674 PAIN IN RIGHT TOE(S): ICD-10-CM

## 2018-11-08 PROCEDURE — 11721 DEBRIDE NAIL 6 OR MORE: CPT | Mod: NC

## 2018-11-13 ENCOUNTER — OUTPATIENT (OUTPATIENT)
Dept: OUTPATIENT SERVICES | Facility: HOSPITAL | Age: 38
LOS: 1 days | Discharge: HOME | End: 2018-11-13

## 2018-12-11 ENCOUNTER — APPOINTMENT (OUTPATIENT)
Dept: HEMATOLOGY ONCOLOGY | Facility: CLINIC | Age: 38
End: 2018-12-11

## 2018-12-11 ENCOUNTER — OUTPATIENT (OUTPATIENT)
Dept: OUTPATIENT SERVICES | Facility: HOSPITAL | Age: 38
LOS: 1 days | Discharge: HOME | End: 2018-12-11

## 2018-12-11 ENCOUNTER — LABORATORY RESULT (OUTPATIENT)
Age: 38
End: 2018-12-11

## 2018-12-11 VITALS
DIASTOLIC BLOOD PRESSURE: 80 MMHG | SYSTOLIC BLOOD PRESSURE: 130 MMHG | TEMPERATURE: 97.1 F | BODY MASS INDEX: 43.39 KG/M2 | HEART RATE: 80 BPM | WEIGHT: 221 LBS | RESPIRATION RATE: 14 BRPM | HEIGHT: 60 IN

## 2018-12-11 LAB
ALBUMIN SERPL ELPH-MCNC: 4 G/DL
ALP BLD-CCNC: 92 U/L
ALT SERPL-CCNC: 45 U/L
ANION GAP SERPL CALC-SCNC: 14 MMOL/L
AST SERPL-CCNC: 25 U/L
BILIRUB DIRECT SERPL-MCNC: <0.2 MG/DL
BILIRUB INDIRECT SERPL-MCNC: >0.2 MG/DL
BILIRUB SERPL-MCNC: 0.4 MG/DL
BUN SERPL-MCNC: 13 MG/DL
CALCIUM SERPL-MCNC: 9.6 MG/DL
CHLORIDE SERPL-SCNC: 100 MMOL/L
CO2 SERPL-SCNC: 27 MMOL/L
CREAT SERPL-MCNC: 1 MG/DL
GLUCOSE SERPL-MCNC: 78 MG/DL
HCT VFR BLD CALC: 50.2 %
HGB BLD-MCNC: 16.6 G/DL
MCHC RBC-ENTMCNC: 28.9 PG
MCHC RBC-ENTMCNC: 33.1 G/DL
MCV RBC AUTO: 87.3 FL
PLATELET # BLD AUTO: 211 K/UL
PMV BLD: 9.8 FL
POTASSIUM SERPL-SCNC: 4.8 MMOL/L
PROT SERPL-MCNC: 7.1 G/DL
RBC # BLD: 5.75 M/UL
RBC # FLD: 15.9 %
SODIUM SERPL-SCNC: 141 MMOL/L
WBC # FLD AUTO: 3.11 K/UL

## 2018-12-11 NOTE — HISTORY OF PRESENT ILLNESS
[de-identified] : 38 yo man with PMH of Down syndrome, obesity, fatty infiltration of liver, has neutropenia. No evidence of fever, or infection. The counts have been decreased since at least 2016. Poor historian. Labs repeated and do not show explanation. Noted ultrasound of liver in 2016 showed hepatomegaly and fatty liver infiltration. \par

## 2018-12-11 NOTE — ASSESSMENT
[FreeTextEntry1] : 38 YO man with Down syndrome and morbid obesity and hepatomegaly (viral w/u is negative); WBC has been low normal and stable since 2016; pt is asymptomatic; believe that leukopenia is related to liver diseas/obesity: recommend gi/ hepatology evaluation and nutritionist evaluation\par rtc and labs in 6 mos

## 2018-12-18 ENCOUNTER — OUTPATIENT (OUTPATIENT)
Dept: OUTPATIENT SERVICES | Facility: HOSPITAL | Age: 38
LOS: 1 days | Discharge: HOME | End: 2018-12-18

## 2018-12-27 DIAGNOSIS — H52.03 HYPERMETROPIA, BILATERAL: ICD-10-CM

## 2018-12-27 DIAGNOSIS — H50.32 INTERMITTENT ALTERNATING ESOTROPIA: ICD-10-CM

## 2019-01-15 ENCOUNTER — APPOINTMENT (OUTPATIENT)
Dept: PODIATRY | Facility: HOSPITAL | Age: 39
End: 2019-01-15
Payer: MEDICAID

## 2019-01-15 ENCOUNTER — OUTPATIENT (OUTPATIENT)
Dept: OUTPATIENT SERVICES | Facility: HOSPITAL | Age: 39
LOS: 1 days | Discharge: HOME | End: 2019-01-15

## 2019-01-15 DIAGNOSIS — M79.674 PAIN IN RIGHT TOE(S): ICD-10-CM

## 2019-01-15 DIAGNOSIS — L85.3 XEROSIS CUTIS: ICD-10-CM

## 2019-01-15 DIAGNOSIS — B35.1 TINEA UNGUIUM: ICD-10-CM

## 2019-01-15 PROCEDURE — 99212 OFFICE O/P EST SF 10 MIN: CPT | Mod: NC,25

## 2019-01-15 PROCEDURE — 11721 DEBRIDE NAIL 6 OR MORE: CPT | Mod: NC

## 2019-01-15 NOTE — PHYSICAL EXAM
[Full Pulse] : the pedal pulses are present [Normal in Appearance] : normal in appearance [Normal] : normal [Full ROM] : with full range of motion [FreeTextEntry1] :  thick and friable nails times ten. dry pedal skin b/l [FreeTextEntry2] : painful thickened nails x 10  [FreeTextEntry6] : Painful thickened nails x 10

## 2019-01-15 NOTE — PROCEDURE
[FreeTextEntry1] : manual debridement of all diseased nails times ten\par  continue using  ammonium lactate \par follow up in two months for foot care.

## 2019-01-22 ENCOUNTER — OUTPATIENT (OUTPATIENT)
Dept: OUTPATIENT SERVICES | Facility: HOSPITAL | Age: 39
LOS: 1 days | Discharge: HOME | End: 2019-01-22

## 2019-01-22 ENCOUNTER — APPOINTMENT (OUTPATIENT)
Age: 39
End: 2019-01-22

## 2019-01-22 VITALS
WEIGHT: 220 LBS | DIASTOLIC BLOOD PRESSURE: 76 MMHG | SYSTOLIC BLOOD PRESSURE: 112 MMHG | TEMPERATURE: 97.4 F | HEART RATE: 88 BPM | HEIGHT: 60 IN | BODY MASS INDEX: 43.19 KG/M2

## 2019-01-22 DIAGNOSIS — E66.9 OBESITY, UNSPECIFIED: ICD-10-CM

## 2019-01-22 DIAGNOSIS — E55.9 VITAMIN D DEFICIENCY, UNSPECIFIED: ICD-10-CM

## 2019-01-22 DIAGNOSIS — E78.5 HYPERLIPIDEMIA, UNSPECIFIED: ICD-10-CM

## 2019-01-22 DIAGNOSIS — D70.9 NEUTROPENIA, UNSPECIFIED: ICD-10-CM

## 2019-01-22 DIAGNOSIS — K76.0 FATTY (CHANGE OF) LIVER, NOT ELSEWHERE CLASSIFIED: ICD-10-CM

## 2019-01-22 NOTE — HISTORY OF PRESENT ILLNESS
[Other: _____] : [unfilled] [FreeTextEntry1] : Pt. is a 39yo male, here for follow up his hyperlipidemia and vitamin d insufficiency.  Pt. seen hematologist for neutropenia, and recommended GI/hepatology evaluation given pt. has h/o fatty liver/hepatomegaly on liver sono.  Pt. unable to provide any history or complaint.  No issues reported by staff.

## 2019-01-22 NOTE — ASSESSMENT
[FreeTextEntry1] : 01.  Neutropenia: stable WBC, and pt. asymptomatic.  Hematology recommended GI/hepatology evaluation given h/o fatty liver/hepatomegaly on sono\par a.  GI referral\par b.  follow hematology as scheduled\par 02.  Hyperlipidemia/obesity: , triglyceride 184 on fish oil, and atorvastatin\par a.  continue current management and diet\par b.  weight loss, exercise as tolerated\par c.  follow dietitian as scheduled\par 03.  Vitamin d insufficiency: on vitamin d supplement

## 2019-01-22 NOTE — PHYSICAL EXAM
[No Respiratory Distress] : no respiratory distress  [Clear to Auscultation] : lungs were clear to auscultation bilaterally [No Accessory Muscle Use] : no accessory muscle use [Normal Rate] : normal rate  [Regular Rhythm] : with a regular rhythm [Normal S1, S2] : normal S1 and S2 [No Murmur] : no murmur heard [No Carotid Bruits] : no carotid bruits [No Abdominal Bruit] : a ~M bruit was not heard ~T in the abdomen [Pedal Pulses Present] : the pedal pulses are present [No Edema] : there was no peripheral edema [No Extremity Clubbing/Cyanosis] : no extremity clubbing/cyanosis [No Palpable Aorta] : no palpable aorta [Soft] : abdomen soft [Non Tender] : non-tender [Non-distended] : non-distended [No Masses] : no abdominal mass palpated [No HSM] : no HSM [Normal Bowel Sounds] : normal bowel sounds [No Joint Swelling] : no joint swelling [Grossly Normal Strength/Tone] : grossly normal strength/tone [de-identified] : obese abdomen

## 2019-01-22 NOTE — COUNSELING
[Weight management counseling provided] : Weight management [Healthy eating counseling provided] : healthy eating [Activity counseling provided] : activity [Low Fat Diet] : Low fat diet [Decrease Portions] : Decrease food portions [Low Salt Diet] : Low salt diet [Walking] : Walking [de-identified] : 1500 calories low fat/chol high fiber mechanical soft 1/4' diet

## 2019-01-30 ENCOUNTER — APPOINTMENT (OUTPATIENT)
Dept: NUTRITION | Facility: HOSPITAL | Age: 39
End: 2019-01-30

## 2019-01-30 VITALS — BODY MASS INDEX: 42.8 KG/M2 | WEIGHT: 218 LBS | HEIGHT: 60 IN

## 2019-01-30 DIAGNOSIS — D70.9 NEUTROPENIA, UNSPECIFIED: ICD-10-CM

## 2019-03-14 ENCOUNTER — OUTPATIENT (OUTPATIENT)
Dept: OUTPATIENT SERVICES | Facility: HOSPITAL | Age: 39
LOS: 1 days | Discharge: HOME | End: 2019-03-14

## 2019-03-14 ENCOUNTER — APPOINTMENT (OUTPATIENT)
Dept: PODIATRY | Facility: HOSPITAL | Age: 39
End: 2019-03-14
Payer: MEDICAID

## 2019-03-14 DIAGNOSIS — F79 UNSPECIFIED INTELLECTUAL DISABILITIES: ICD-10-CM

## 2019-03-14 DIAGNOSIS — B35.1 TINEA UNGUIUM: ICD-10-CM

## 2019-03-14 DIAGNOSIS — L85.3 XEROSIS CUTIS: ICD-10-CM

## 2019-03-14 DIAGNOSIS — M79.674 PAIN IN RIGHT TOE(S): ICD-10-CM

## 2019-03-14 PROCEDURE — 11721 DEBRIDE NAIL 6 OR MORE: CPT | Mod: NC

## 2019-03-14 PROCEDURE — 99212 OFFICE O/P EST SF 10 MIN: CPT | Mod: NC,25

## 2019-03-14 NOTE — PROCEDURE
[FreeTextEntry1] : manual debridement of all diseased nails times ten\par  renewed  ammonium lactate \par follow up in two months for foot care.

## 2019-03-14 NOTE — HISTORY OF PRESENT ILLNESS
[FreeTextEntry1] : 37 year old MRDD male is here today for treatment of dystrophic nails that are painful and Dry plantar skin of feet

## 2019-03-25 ENCOUNTER — APPOINTMENT (OUTPATIENT)
Age: 39
End: 2019-03-25

## 2019-04-09 ENCOUNTER — APPOINTMENT (OUTPATIENT)
Age: 39
End: 2019-04-09

## 2019-04-12 ENCOUNTER — APPOINTMENT (OUTPATIENT)
Dept: GASTROENTEROLOGY | Facility: CLINIC | Age: 39
End: 2019-04-12

## 2019-04-30 ENCOUNTER — OUTPATIENT (OUTPATIENT)
Dept: OUTPATIENT SERVICES | Facility: HOSPITAL | Age: 39
LOS: 1 days | Discharge: HOME | End: 2019-04-30

## 2019-04-30 ENCOUNTER — APPOINTMENT (OUTPATIENT)
Age: 39
End: 2019-04-30

## 2019-04-30 VITALS
DIASTOLIC BLOOD PRESSURE: 74 MMHG | HEIGHT: 60 IN | BODY MASS INDEX: 42.6 KG/M2 | WEIGHT: 217 LBS | HEART RATE: 84 BPM | TEMPERATURE: 99.5 F | SYSTOLIC BLOOD PRESSURE: 118 MMHG | RESPIRATION RATE: 16 BRPM

## 2019-04-30 DIAGNOSIS — E78.5 HYPERLIPIDEMIA, UNSPECIFIED: ICD-10-CM

## 2019-04-30 DIAGNOSIS — R76.11 NONSPECIFIC REACTION TO TUBERCULIN SKIN TEST WITHOUT ACTIVE TUBERCULOSIS: ICD-10-CM

## 2019-04-30 DIAGNOSIS — K76.0 FATTY (CHANGE OF) LIVER, NOT ELSEWHERE CLASSIFIED: ICD-10-CM

## 2019-04-30 DIAGNOSIS — E66.9 OBESITY, UNSPECIFIED: ICD-10-CM

## 2019-04-30 DIAGNOSIS — R76.11 NONSPECIFIC REACTION TO TUBERCULIN SKIN TEST W/OUT ACTIVE TUBERCULOSIS: ICD-10-CM

## 2019-04-30 DIAGNOSIS — E55.9 VITAMIN D DEFICIENCY, UNSPECIFIED: ICD-10-CM

## 2019-04-30 DIAGNOSIS — Z00.00 ENCOUNTER FOR GENERAL ADULT MEDICAL EXAMINATION WITHOUT ABNORMAL FINDINGS: ICD-10-CM

## 2019-04-30 NOTE — COUNSELING
[Weight management counseling provided] : Weight management [Healthy eating counseling provided] : healthy eating [Activity counseling provided] : activity [Low Fat Diet] : Low fat diet [Low Salt Diet] : Low salt diet [Decrease Portions] : Decrease food portions [Walking] : Walking [de-identified] : 1500 calories low fat/chol high fiber mechanical soft 1/4" diet

## 2019-04-30 NOTE — HISTORY OF PRESENT ILLNESS
[Other: _____] : [unfilled] [FreeTextEntry1] : Pt. is a 39yo male, here for annual physical exam.  Pt. is a poor, unreliable historian.  Pt. denies any complaint.  No issues reported by staff.

## 2019-04-30 NOTE — PHYSICAL EXAM
[No Acute Distress] : no acute distress [Well Nourished] : well nourished [Well Developed] : well developed [Well-Appearing] : well-appearing [Normal Sclera/Conjunctiva] : normal sclera/conjunctiva [PERRL] : pupils equal round and reactive to light [EOMI] : extraocular movements intact [Normal Outer Ear/Nose] : the outer ears and nose were normal in appearance [Normal Oropharynx] : the oropharynx was normal [Normal TMs] : both tympanic membranes were normal [Normal Nasal Mucosa] : the nasal mucosa was normal [No JVD] : no jugular venous distention [Supple] : supple [No Lymphadenopathy] : no lymphadenopathy [No Respiratory Distress] : no respiratory distress  [Clear to Auscultation] : lungs were clear to auscultation bilaterally [No Accessory Muscle Use] : no accessory muscle use [Normal Rate] : normal rate  [Regular Rhythm] : with a regular rhythm [Normal S1, S2] : normal S1 and S2 [No Murmur] : no murmur heard [No Carotid Bruits] : no carotid bruits [No Abdominal Bruit] : a ~M bruit was not heard ~T in the abdomen [Pedal Pulses Present] : the pedal pulses are present [No Edema] : there was no peripheral edema [No Extremity Clubbing/Cyanosis] : no extremity clubbing/cyanosis [No Palpable Aorta] : no palpable aorta [Soft] : abdomen soft [Non Tender] : non-tender [Non-distended] : non-distended [No Masses] : no abdominal mass palpated [No HSM] : no HSM [Normal Bowel Sounds] : normal bowel sounds [Normal Posterior Cervical Nodes] : no posterior cervical lymphadenopathy [Normal Anterior Cervical Nodes] : no anterior cervical lymphadenopathy [No CVA Tenderness] : no CVA  tenderness [No Spinal Tenderness] : no spinal tenderness [No Joint Swelling] : no joint swelling [Grossly Normal Strength/Tone] : grossly normal strength/tone [No Rash] : no rash [No Skin Lesions] : no skin lesions [Acne] : no acne [de-identified] : obese abdomen [de-identified] : awake and alert, unable to exam

## 2019-04-30 NOTE — ASSESSMENT
[FreeTextEntry1] : 1.	Diet: 1500 calories low fat/chol high fiber mechanical soft 1/4' diet\par 2.	Annual ophthal./optometry evaluation\par 3.	Annual dental evaluation\par 4.	fasting CMP, CB, lipid profile, vitamin d, alpha feto protein\par 5.	EKG ordered\par 6.	Pt. is a non-smoker, BMI 42.38, followed by dietitian\par 7.	Had Tdap 5/9/16, flu vaccine 10/23/18\par 8.	Depression screening: unable to assess pt. for depression due to pt’s cognitive impairment\par 9.	Fall screening: no report fall for past year per staff, no unsteady/imbalance gait\par 10.	Hyperlipidemia/fatty liver/obesity: on atorvastatin, and fish oil\par a.  continue current med. and diet\par b.  weight loss, exercise as tolerated\par c.  follow dietitian as scheduled\par d.  follow GI appointment as scheduled 5/3/19\par e.  liver sono\par f.  follow up visit in 3 months\par 11.	Vitamin d insufficiency: on vitamin d supplement\par a.  continue vitamin d supplement\par 12.	PPD (+): pt. asymptomatic, no cough, no weight loss\par a.  Pa/Lat CXR\par 13.	Above plan was discussed with group home staff

## 2019-05-03 ENCOUNTER — APPOINTMENT (OUTPATIENT)
Dept: GASTROENTEROLOGY | Facility: CLINIC | Age: 39
End: 2019-05-03

## 2019-05-03 ENCOUNTER — OUTPATIENT (OUTPATIENT)
Dept: OUTPATIENT SERVICES | Facility: HOSPITAL | Age: 39
LOS: 1 days | Discharge: HOME | End: 2019-05-03

## 2019-05-03 VITALS
DIASTOLIC BLOOD PRESSURE: 80 MMHG | HEIGHT: 60 IN | HEART RATE: 85 BPM | BODY MASS INDEX: 42.6 KG/M2 | WEIGHT: 217 LBS | SYSTOLIC BLOOD PRESSURE: 132 MMHG

## 2019-05-03 NOTE — PHYSICAL EXAM
[General Appearance - In No Acute Distress] : in no acute distress [General Appearance - Well Nourished] : well nourished [PERRL With Normal Accommodation] : pupils were equal in size, round, and reactive to light [Outer Ear] : the ears and nose were normal in appearance [Hearing Threshold Finger Rub Not Coleman] : hearing was normal [Apical Impulse] : the apical impulse was normal [Heart Sounds] : normal S1 and S2 [Edema] : there was no peripheral edema [Bowel Sounds] : normal bowel sounds [Abdomen Soft] : soft [Abdomen Tenderness] : non-tender [No CVA Tenderness] : no ~M costovertebral angle tenderness [Musculoskeletal - Swelling] : no joint swelling seen [Skin Turgor] : normal skin turgor [] : no rash

## 2019-05-03 NOTE — END OF VISIT
[FreeTextEntry3] : Pt seen and examined.  Previous hematochezia now gone.  Pt's brother had refused colonoscopy in the past.  We will recheck CBC to ensure there is no anemia.  Pt's ALT mildly elevated we will complete CLD work up with AMA.  Pt will follow up in one month.

## 2019-05-03 NOTE — ASSESSMENT
[FreeTextEntry1] : Pt. is a 37yo male, with down syndrome, positive PPD, obesity and DLD is here for follow up.\par \par # Blood per rectum, resolved\par - H&H stable, last hb 16.6 dec 2018 no further reports per aide.\par - monitor H&H\par \par # Transaminitis \par - CLD w/u negative ( including ceruloplasmin, ASMA, ANCA, RUSLAN, Viral hep B, C and ferritin ). \par -will check AMA \par -follow up in 1 month\par

## 2019-05-03 NOTE — HISTORY OF PRESENT ILLNESS
[de-identified] : Pt. is a 37yo male, with down syndrome, positive PPD, obesity and DLD is here for follow up.\par he had fresh blood epr rectum when wiping since July of 2017 that resolved now and not reported by at bedside caregiver.\par last plan from GI team was to do colonoscopy to r/o tumor, but the brother who is a MD refused.\par no constipation no diarrhea reported no melena last hb 16.6 in dec\par \par he is also following for mild elevated ALT that is stable now (max 2017=60 -> 45 dec 2018)\par no abdominal pain reported by housestaff

## 2019-05-09 ENCOUNTER — OUTPATIENT (OUTPATIENT)
Dept: OUTPATIENT SERVICES | Facility: HOSPITAL | Age: 39
LOS: 1 days | Discharge: HOME | End: 2019-05-09

## 2019-05-09 ENCOUNTER — APPOINTMENT (OUTPATIENT)
Dept: PODIATRY | Facility: HOSPITAL | Age: 39
End: 2019-05-09
Payer: MEDICAID

## 2019-05-09 DIAGNOSIS — M79.674 PAIN IN RIGHT TOE(S): ICD-10-CM

## 2019-05-09 DIAGNOSIS — B35.3 TINEA PEDIS: ICD-10-CM

## 2019-05-09 DIAGNOSIS — B35.1 TINEA UNGUIUM: ICD-10-CM

## 2019-05-09 PROCEDURE — 11721 DEBRIDE NAIL 6 OR MORE: CPT | Mod: NC

## 2019-05-09 NOTE — HISTORY OF PRESENT ILLNESS
[FreeTextEntry1] : 38 year old MRDD male is here today for treatment of dystrophic nails that are painful and Dry plantar skin of feet. no new changes

## 2019-05-09 NOTE — PHYSICAL EXAM
[Full Pulse] : the pedal pulses are present [Normal in Appearance] : normal in appearance [Normal] : normal [Full ROM] : with full range of motion [FreeTextEntry2] : painful thickened nails x 10  [FreeTextEntry1] :  thick and friable nails w/ subungual debris  times ten. dry pedal skin b/l. interdigital macerations b/l  [FreeTextEntry6] : Painful thickened nails x 10

## 2019-05-09 NOTE — PROCEDURE
[FreeTextEntry1] : manual debridement of all diseased nails, mycotic nails x times ten\par Rx ammonium lactate and clotrimazole\par follow up in two months for foot care.

## 2019-05-13 DIAGNOSIS — R74.8 ABNORMAL LEVELS OF OTHER SERUM ENZYMES: ICD-10-CM

## 2019-05-13 LAB — 25(OH)D3 SERPL-MCNC: 20 NG/ML

## 2019-05-14 ENCOUNTER — APPOINTMENT (OUTPATIENT)
Dept: HEMATOLOGY ONCOLOGY | Facility: CLINIC | Age: 39
End: 2019-05-14

## 2019-05-14 ENCOUNTER — OUTPATIENT (OUTPATIENT)
Dept: OUTPATIENT SERVICES | Facility: HOSPITAL | Age: 39
LOS: 1 days | Discharge: HOME | End: 2019-05-14

## 2019-05-14 VITALS
BODY MASS INDEX: 42.6 KG/M2 | RESPIRATION RATE: 14 BRPM | WEIGHT: 217 LBS | HEART RATE: 86 BPM | HEIGHT: 60 IN | DIASTOLIC BLOOD PRESSURE: 66 MMHG | SYSTOLIC BLOOD PRESSURE: 110 MMHG | TEMPERATURE: 98.6 F

## 2019-05-14 LAB
AFP-TM SERPL-MCNC: 3.4 NG/ML
ALBUMIN SERPL ELPH-MCNC: 4.1 G/DL
ALP BLD-CCNC: 98 U/L
ALT SERPL-CCNC: 64 U/L
ANION GAP SERPL CALC-SCNC: 13 MMOL/L
AST SERPL-CCNC: 30 U/L
BASOPHILS # BLD AUTO: 0.06 K/UL
BASOPHILS NFR BLD AUTO: 2.1 %
BILIRUB SERPL-MCNC: 0.4 MG/DL
BUN SERPL-MCNC: 12 MG/DL
CALCIUM SERPL-MCNC: 8.9 MG/DL
CHLORIDE SERPL-SCNC: 107 MMOL/L
CHOLEST SERPL-MCNC: 157 MG/DL
CHOLEST/HDLC SERPL: 3.8 RATIO
CO2 SERPL-SCNC: 26 MMOL/L
CREAT SERPL-MCNC: 1 MG/DL
EOSINOPHIL # BLD AUTO: 0.08 K/UL
EOSINOPHIL NFR BLD AUTO: 2.8 %
GLUCOSE SERPL-MCNC: 94 MG/DL
HCT VFR BLD CALC: 53.1 %
HDLC SERPL-MCNC: 41 MG/DL
HGB BLD-MCNC: 17.1 G/DL
IMM GRANULOCYTES NFR BLD AUTO: 0.3 %
LDLC SERPL CALC-MCNC: 108 MG/DL
LYMPHOCYTES # BLD AUTO: 1.15 K/UL
LYMPHOCYTES NFR BLD AUTO: 39.8 %
MAN DIFF?: NORMAL
MCHC RBC-ENTMCNC: 29.3 PG
MCHC RBC-ENTMCNC: 32.2 G/DL
MCV RBC AUTO: 91.1 FL
MONOCYTES # BLD AUTO: 0.32 K/UL
MONOCYTES NFR BLD AUTO: 11.1 %
NEUTROPHILS # BLD AUTO: 1.27 K/UL
NEUTROPHILS NFR BLD AUTO: 43.9 %
PLATELET # BLD AUTO: 215 K/UL
POTASSIUM SERPL-SCNC: 4.5 MMOL/L
PROT SERPL-MCNC: 7.1 G/DL
RBC # BLD: 5.83 M/UL
RBC # FLD: 17.5 %
SODIUM SERPL-SCNC: 146 MMOL/L
TRIGL SERPL-MCNC: 132 MG/DL
WBC # FLD AUTO: 2.89 K/UL

## 2019-05-14 NOTE — HISTORY OF PRESENT ILLNESS
[de-identified] : 36 yo man with PMH of Down syndrome, obesity, fatty infiltration of liver, has neutropenia. No evidence of fever, or infection. The counts have been decreased since at least 2016. Poor historian. Labs repeated and do not show explanation. Noted ultrasound of liver in 2016 showed hepatomegaly and fatty liver infiltration. \par

## 2019-05-14 NOTE — ASSESSMENT
[FreeTextEntry1] : 37 YO man with Down syndrome and morbid obesity and hepatomegaly (viral w/u is negative); WBC has been low normal and stable since 2016; pt is asymptomatic; believe that leukopenia is related to liver diseas/obesity: recommend gi/ hepatology evaluation and nutritionist evaluation\par \par \par No need to follow hematology at this point\par rtc prn

## 2019-05-20 ENCOUNTER — FORM ENCOUNTER (OUTPATIENT)
Age: 39
End: 2019-05-20

## 2019-05-21 ENCOUNTER — OUTPATIENT (OUTPATIENT)
Dept: OUTPATIENT SERVICES | Facility: HOSPITAL | Age: 39
LOS: 1 days | Discharge: HOME | End: 2019-05-21
Payer: MEDICAID

## 2019-05-21 DIAGNOSIS — R76.11 NONSPECIFIC REACTION TO TUBERCULIN SKIN TEST WITHOUT ACTIVE TUBERCULOSIS: ICD-10-CM

## 2019-05-21 DIAGNOSIS — R76.0 RAISED ANTIBODY TITER: ICD-10-CM

## 2019-05-21 PROCEDURE — 71046 X-RAY EXAM CHEST 2 VIEWS: CPT | Mod: 26

## 2019-05-21 PROCEDURE — 76705 ECHO EXAM OF ABDOMEN: CPT | Mod: 26

## 2019-05-24 DIAGNOSIS — D72.819 DECREASED WHITE BLOOD CELL COUNT, UNSPECIFIED: ICD-10-CM

## 2019-05-30 LAB
ALBUMIN SERPL ELPH-MCNC: 4.1 G/DL
ALP BLD-CCNC: 98 U/L
ALT SERPL-CCNC: 63 U/L
AST SERPL-CCNC: 32 U/L
BILIRUB DIRECT SERPL-MCNC: <0.2 MG/DL
BILIRUB INDIRECT SERPL-MCNC: >0.3 MG/DL
BILIRUB SERPL-MCNC: 0.5 MG/DL
MITOCHONDRIA AB SER IF-ACNC: NORMAL
PROT SERPL-MCNC: 7.1 G/DL

## 2019-06-17 ENCOUNTER — APPOINTMENT (OUTPATIENT)
Age: 39
End: 2019-06-17

## 2019-06-18 ENCOUNTER — OUTPATIENT (OUTPATIENT)
Dept: OUTPATIENT SERVICES | Facility: HOSPITAL | Age: 39
LOS: 1 days | Discharge: HOME | End: 2019-06-18

## 2019-06-18 ENCOUNTER — APPOINTMENT (OUTPATIENT)
Age: 39
End: 2019-06-18

## 2019-06-18 VITALS
HEART RATE: 80 BPM | TEMPERATURE: 97.4 F | BODY MASS INDEX: 42.21 KG/M2 | WEIGHT: 215 LBS | RESPIRATION RATE: 16 BRPM | DIASTOLIC BLOOD PRESSURE: 68 MMHG | HEIGHT: 60 IN | SYSTOLIC BLOOD PRESSURE: 102 MMHG

## 2019-06-18 DIAGNOSIS — E78.5 HYPERLIPIDEMIA, UNSPECIFIED: ICD-10-CM

## 2019-06-18 DIAGNOSIS — E66.9 OBESITY, UNSPECIFIED: ICD-10-CM

## 2019-06-18 DIAGNOSIS — Z87.19 PERSONAL HISTORY OF OTHER DISEASES OF THE DIGESTIVE SYSTEM: ICD-10-CM

## 2019-06-18 DIAGNOSIS — K76.0 FATTY (CHANGE OF) LIVER, NOT ELSEWHERE CLASSIFIED: ICD-10-CM

## 2019-06-18 DIAGNOSIS — E55.9 VITAMIN D DEFICIENCY, UNSPECIFIED: ICD-10-CM

## 2019-06-18 NOTE — COUNSELING
[Weight management counseling provided] : Weight management [Healthy eating counseling provided] : healthy eating [Activity counseling provided] : activity [Low Fat Diet] : Low fat diet [Low Salt Diet] : Low salt diet [Decrease Portions] : Decrease food portions [Walking] : Walking [de-identified] : 1500 calories low fat/chol high fiber mechanical soft 1/4" diet

## 2019-06-18 NOTE — PHYSICAL EXAM
[No Respiratory Distress] : no respiratory distress  [Clear to Auscultation] : lungs were clear to auscultation bilaterally [No Accessory Muscle Use] : no accessory muscle use [Normal Rate] : normal rate  [Regular Rhythm] : with a regular rhythm [Normal S1, S2] : normal S1 and S2 [No Murmur] : no murmur heard [No Carotid Bruits] : no carotid bruits [No Abdominal Bruit] : a ~M bruit was not heard ~T in the abdomen [Pedal Pulses Present] : the pedal pulses are present [No Edema] : there was no peripheral edema [No Extremity Clubbing/Cyanosis] : no extremity clubbing/cyanosis [No Palpable Aorta] : no palpable aorta [Soft] : abdomen soft [Non Tender] : non-tender [Non-distended] : non-distended [No Masses] : no abdominal mass palpated [No HSM] : no HSM [Normal Bowel Sounds] : normal bowel sounds [No Joint Swelling] : no joint swelling [Grossly Normal Strength/Tone] : grossly normal strength/tone [Acne] : no acne [de-identified] : obese abdomen

## 2019-06-18 NOTE — HISTORY OF PRESENT ILLNESS
[Other: _____] : [unfilled] [FreeTextEntry1] : Pt. is a 39yo male, here for follow up hyperlipidemia, and vitamin d insufficiency.  Pt. unable to provide any history or complaint due to h is cognitive impairment.  Pt. is followed by GI for fatty liver/elevated LFT.  No issues reported by staff.

## 2019-07-05 ENCOUNTER — APPOINTMENT (OUTPATIENT)
Dept: GASTROENTEROLOGY | Facility: CLINIC | Age: 39
End: 2019-07-05

## 2019-07-11 ENCOUNTER — OUTPATIENT (OUTPATIENT)
Dept: OUTPATIENT SERVICES | Facility: HOSPITAL | Age: 39
LOS: 1 days | Discharge: HOME | End: 2019-07-11

## 2019-07-11 ENCOUNTER — APPOINTMENT (OUTPATIENT)
Dept: PODIATRY | Facility: HOSPITAL | Age: 39
End: 2019-07-11
Payer: MEDICAID

## 2019-07-11 DIAGNOSIS — M79.671 PAIN IN RIGHT FOOT: ICD-10-CM

## 2019-07-11 DIAGNOSIS — L60.3 NAIL DYSTROPHY: ICD-10-CM

## 2019-07-11 DIAGNOSIS — M79.672 PAIN IN LEFT FOOT: ICD-10-CM

## 2019-07-11 PROCEDURE — 11721 DEBRIDE NAIL 6 OR MORE: CPT | Mod: NC

## 2019-07-11 NOTE — PHYSICAL EXAM
[Full Pulse] : the pedal pulses are present [Normal in Appearance] : normal in appearance [Normal] : normal [Full ROM] : with full range of motion [FreeTextEntry1] :  thick and friable nails w/ subungual debris  times ten. dry pedal skin b/l. interdigital macerations b/l  [FreeTextEntry2] : painful thickened nails x 10  [FreeTextEntry6] : Painful thickened nails x 10

## 2019-07-11 NOTE — PROCEDURE
[FreeTextEntry1] : manual debridement of all diseased nails, mycotic nails x times ten\par continue  ammonium lactate and clotrimazole\par follow up in two months for foot care.

## 2019-07-11 NOTE — HISTORY OF PRESENT ILLNESS
[FreeTextEntry1] : 38 year old MRDD male returns today for treatment of dystrophic nails that are painful and Dry plantar skin of feet. no new changes

## 2019-07-17 ENCOUNTER — APPOINTMENT (OUTPATIENT)
Dept: NUTRITION | Facility: HOSPITAL | Age: 39
End: 2019-07-17

## 2019-07-17 VITALS — WEIGHT: 215 LBS | HEIGHT: 60 IN | BODY MASS INDEX: 42.21 KG/M2

## 2019-07-24 ENCOUNTER — APPOINTMENT (OUTPATIENT)
Age: 39
End: 2019-07-24

## 2019-07-24 VITALS
HEART RATE: 72 BPM | DIASTOLIC BLOOD PRESSURE: 72 MMHG | TEMPERATURE: 98.5 F | WEIGHT: 214 LBS | RESPIRATION RATE: 16 BRPM | BODY MASS INDEX: 42.01 KG/M2 | HEIGHT: 60 IN | SYSTOLIC BLOOD PRESSURE: 124 MMHG

## 2019-08-01 ENCOUNTER — RX RENEWAL (OUTPATIENT)
Age: 39
End: 2019-08-01

## 2019-08-30 ENCOUNTER — APPOINTMENT (OUTPATIENT)
Dept: GASTROENTEROLOGY | Facility: CLINIC | Age: 39
End: 2019-08-30

## 2019-09-12 ENCOUNTER — OUTPATIENT (OUTPATIENT)
Dept: OUTPATIENT SERVICES | Facility: HOSPITAL | Age: 39
LOS: 1 days | Discharge: HOME | End: 2019-09-12

## 2019-09-12 ENCOUNTER — APPOINTMENT (OUTPATIENT)
Dept: PEDIATRIC DEVELOPMENTAL SERVICES | Facility: HOSPITAL | Age: 39
End: 2019-09-12

## 2019-09-12 VITALS
RESPIRATION RATE: 16 BRPM | DIASTOLIC BLOOD PRESSURE: 72 MMHG | TEMPERATURE: 97.7 F | SYSTOLIC BLOOD PRESSURE: 100 MMHG | BODY MASS INDEX: 42.01 KG/M2 | WEIGHT: 214 LBS | HEIGHT: 60 IN | HEART RATE: 72 BPM

## 2019-09-12 DIAGNOSIS — E55.9 VITAMIN D DEFICIENCY, UNSPECIFIED: ICD-10-CM

## 2019-09-12 DIAGNOSIS — E66.9 OBESITY, UNSPECIFIED: ICD-10-CM

## 2019-09-12 DIAGNOSIS — E78.5 HYPERLIPIDEMIA, UNSPECIFIED: ICD-10-CM

## 2019-09-12 DIAGNOSIS — Z23 ENCOUNTER FOR IMMUNIZATION: ICD-10-CM

## 2019-09-12 NOTE — COUNSELING
[Potential consequences of obesity discussed] : Potential consequences of obesity discussed [Benefits of weight loss discussed] : Benefits of weight loss discussed [FreeTextEntry2] : 1500 calories low fat/chol high fiber mechanical soft 1/4" diet

## 2019-09-12 NOTE — HISTORY OF PRESENT ILLNESS
[Other: _____] : [unfilled] [FreeTextEntry1] : Pt. is a 37yo male, here for follow up hyperlipidemia, vitamin d insufficiency and flu vaccine.  Pt. unable to provide any history or complaint due to his cognitive impairment.  No issues reported by staff.

## 2019-09-12 NOTE — PHYSICAL EXAM
[No Respiratory Distress] : no respiratory distress  [No Accessory Muscle Use] : no accessory muscle use [Clear to Auscultation] : lungs were clear to auscultation bilaterally [Normal Rate] : normal rate  [Regular Rhythm] : with a regular rhythm [Normal S1, S2] : normal S1 and S2 [No Carotid Bruits] : no carotid bruits [No Abdominal Bruit] : a ~M bruit was not heard ~T in the abdomen [No Varicosities] : no varicosities [Pedal Pulses Present] : the pedal pulses are present [No Edema] : there was no peripheral edema [No Palpable Aorta] : no palpable aorta [No Extremity Clubbing/Cyanosis] : no extremity clubbing/cyanosis [Soft] : abdomen soft [Non Tender] : non-tender [Non-distended] : non-distended [No Masses] : no abdominal mass palpated [No HSM] : no HSM [Normal Bowel Sounds] : normal bowel sounds [No Joint Swelling] : no joint swelling [Grossly Normal Strength/Tone] : grossly normal strength/tone

## 2019-09-19 ENCOUNTER — APPOINTMENT (OUTPATIENT)
Dept: PODIATRY | Facility: HOSPITAL | Age: 39
End: 2019-09-19

## 2019-10-03 ENCOUNTER — APPOINTMENT (OUTPATIENT)
Dept: PODIATRY | Facility: HOSPITAL | Age: 39
End: 2019-10-03

## 2019-10-31 ENCOUNTER — APPOINTMENT (OUTPATIENT)
Dept: PODIATRY | Facility: HOSPITAL | Age: 39
End: 2019-10-31

## 2019-11-04 ENCOUNTER — APPOINTMENT (OUTPATIENT)
Dept: HEPATOLOGY | Facility: CLINIC | Age: 39
End: 2019-11-04

## 2019-11-20 ENCOUNTER — APPOINTMENT (OUTPATIENT)
Dept: NUTRITION | Facility: HOSPITAL | Age: 39
End: 2019-11-20

## 2019-11-20 VITALS — WEIGHT: 219 LBS | BODY MASS INDEX: 43 KG/M2 | HEIGHT: 60 IN

## 2019-11-21 ENCOUNTER — APPOINTMENT (OUTPATIENT)
Dept: PODIATRY | Facility: HOSPITAL | Age: 39
End: 2019-11-21
Payer: MEDICAID

## 2019-11-21 ENCOUNTER — OUTPATIENT (OUTPATIENT)
Dept: OUTPATIENT SERVICES | Facility: HOSPITAL | Age: 39
LOS: 1 days | Discharge: HOME | End: 2019-11-21

## 2019-11-21 DIAGNOSIS — B35.1 TINEA UNGUIUM: ICD-10-CM

## 2019-11-21 DIAGNOSIS — M79.674 PAIN IN RIGHT TOE(S): ICD-10-CM

## 2019-11-21 PROCEDURE — 11721 DEBRIDE NAIL 6 OR MORE: CPT | Mod: NC

## 2019-11-21 NOTE — PHYSICAL EXAM
[General Appearance - Alert] : alert [General Appearance - In No Acute Distress] : in no acute distress [Full Pulse] : the pedal pulses are present [Normal in Appearance] : normal in appearance [Normal] : normal [Full ROM] : with full range of motion [Oriented To Time, Place, And Person] : oriented to person, place, and time [Impaired Insight] : insight and judgment were intact [FreeTextEntry1] :  thick and friable nails w/ subungual debris  times ten. dry pedal skin b/l. interdigital macerations b/l  [FreeTextEntry2] : painful thickened nails x 10  [FreeTextEntry6] : Painful thickened nails x 10

## 2019-12-09 ENCOUNTER — RX RENEWAL (OUTPATIENT)
Age: 39
End: 2019-12-09

## 2019-12-12 ENCOUNTER — APPOINTMENT (OUTPATIENT)
Dept: PEDIATRIC DEVELOPMENTAL SERVICES | Facility: HOSPITAL | Age: 39
End: 2019-12-12
Payer: MEDICAID

## 2019-12-12 ENCOUNTER — OUTPATIENT (OUTPATIENT)
Dept: OUTPATIENT SERVICES | Facility: HOSPITAL | Age: 39
LOS: 1 days | Discharge: HOME | End: 2019-12-12

## 2019-12-12 VITALS
TEMPERATURE: 97.3 F | SYSTOLIC BLOOD PRESSURE: 110 MMHG | RESPIRATION RATE: 14 BRPM | HEIGHT: 60 IN | HEART RATE: 68 BPM | DIASTOLIC BLOOD PRESSURE: 80 MMHG | WEIGHT: 216 LBS | BODY MASS INDEX: 42.41 KG/M2

## 2019-12-12 DIAGNOSIS — E55.9 VITAMIN D DEFICIENCY, UNSPECIFIED: ICD-10-CM

## 2019-12-12 DIAGNOSIS — Z00.00 ENCOUNTER FOR GENERAL ADULT MEDICAL EXAMINATION W/OUT ABNORMAL FINDINGS: ICD-10-CM

## 2019-12-12 DIAGNOSIS — E78.5 HYPERLIPIDEMIA, UNSPECIFIED: ICD-10-CM

## 2019-12-12 DIAGNOSIS — E66.9 OBESITY, UNSPECIFIED: ICD-10-CM

## 2019-12-12 PROCEDURE — 99213 OFFICE O/P EST LOW 20 MIN: CPT | Mod: GC

## 2019-12-12 NOTE — HISTORY OF PRESENT ILLNESS
[Other: _____] : [unfilled] [FreeTextEntry1] : Pt. is a 39yo male, here for follow up hyperlipidemia and vitamin d insufficiency.  Pt. unable to provide any history or complaint due to his cognitive impairment.  No issues reported by staff.

## 2019-12-12 NOTE — PHYSICAL EXAM
[No Respiratory Distress] : no respiratory distress  [No Accessory Muscle Use] : no accessory muscle use [Regular Rhythm] : with a regular rhythm [Normal Rate] : normal rate  [Clear to Auscultation] : lungs were clear to auscultation bilaterally [Normal S1, S2] : normal S1 and S2 [No Carotid Bruits] : no carotid bruits [No Varicosities] : no varicosities [Pedal Pulses Present] : the pedal pulses are present [No Abdominal Bruit] : a ~M bruit was not heard ~T in the abdomen [No Palpable Aorta] : no palpable aorta [No Edema] : there was no peripheral edema [Soft] : abdomen soft [No Extremity Clubbing/Cyanosis] : no extremity clubbing/cyanosis [Non Tender] : non-tender [Non-distended] : non-distended [No HSM] : no HSM [No Masses] : no abdominal mass palpated [No Joint Swelling] : no joint swelling [Grossly Normal Strength/Tone] : grossly normal strength/tone [Normal Bowel Sounds] : normal bowel sounds [de-identified] : obese abdomen

## 2019-12-23 ENCOUNTER — OUTPATIENT (OUTPATIENT)
Dept: OUTPATIENT SERVICES | Facility: HOSPITAL | Age: 39
LOS: 1 days | Discharge: HOME | End: 2019-12-23

## 2019-12-23 ENCOUNTER — APPOINTMENT (OUTPATIENT)
Dept: HEPATOLOGY | Facility: CLINIC | Age: 39
End: 2019-12-23
Payer: MEDICAID

## 2019-12-23 DIAGNOSIS — R74.0 NONSPECIFIC ELEVATION OF LEVELS OF TRANSAMINASE AND LACTIC ACID DEHYDROGENASE [LDH]: ICD-10-CM

## 2019-12-23 DIAGNOSIS — K76.0 FATTY (CHANGE OF) LIVER, NOT ELSEWHERE CLASSIFIED: ICD-10-CM

## 2019-12-23 PROCEDURE — 90471 IMMUNIZATION ADMIN: CPT

## 2019-12-23 PROCEDURE — 99203 OFFICE O/P NEW LOW 30 MIN: CPT | Mod: 25

## 2019-12-23 PROCEDURE — 90636 HEP A/HEP B VACC ADULT IM: CPT

## 2019-12-23 NOTE — ASSESSMENT
[FreeTextEntry1] : The patient is a 38 year-old male with a PMH of Downs syndrome, obesity, dyslipidemia, +PPD, presenting for mild transaminitis.\par \par # Mild transaminitis, stable, likely secondary to hepatic steatosis\par CLD w/u negative\par US Abdomen in May 2019 revealed hepatomegaly, hepatic steatosis.\par AST/ALT 30/64 <- 32/63 <- 25/45\par Patient and health aid advised for patient to lose weight, increase exercise and intake of fruits/vegetables.\par Hepatitis A and B vaccines given on this visit.\par F/u in 6 months\par \par # Dyslipidemia\par C/w atorvastatin 10 mg PO qD\par F/u w/ PMD as scheduled\par \par # Healthcare maintenance\par Patient up to date w/ vaccines\par F/u w/ PMD

## 2019-12-23 NOTE — HISTORY OF PRESENT ILLNESS
[___] : Performed [unfilled] [de-identified] : hepatic steatosis [de-identified] : The patient is a 38 year-old male with a PMH of Downs syndrome, obesity, dyslipidemia, +PPD, presenting for mild transaminitis. Per health aid (Mary), the patient has been well since visit to GI clinic in May of 2019. Per health aid, he does not consume alcohol, tobacco, or illicit drugs; his weight has been stable over this period.

## 2019-12-23 NOTE — PHYSICAL EXAM
[General Appearance - Alert] : alert [General Appearance - In No Acute Distress] : in no acute distress [General Appearance - Well Nourished] : well nourished [General Appearance - Well Developed] : well developed [General Appearance - Well-Appearing] : healthy appearing [] : no respiratory distress [Respiration, Rhythm And Depth] : normal respiratory rhythm and effort [Exaggerated Use Of Accessory Muscles For Inspiration] : no accessory muscle use [Auscultation Breath Sounds / Voice Sounds] : lungs were clear to auscultation bilaterally [Heart Rate And Rhythm] : heart rate was normal and rhythm regular [Heart Sounds] : normal S1 and S2 [Murmurs] : no murmurs [Abdomen Soft] : soft [Abdomen Tenderness] : non-tender [Abdomen Mass (___ Cm)] : no abdominal mass palpated [Skin Color & Pigmentation] : normal skin color and pigmentation [FreeTextEntry1] : obese body habitus

## 2019-12-24 DIAGNOSIS — Z00.00 ENCOUNTER FOR GENERAL ADULT MEDICAL EXAMINATION WITHOUT ABNORMAL FINDINGS: ICD-10-CM

## 2019-12-24 DIAGNOSIS — R74.0 NONSPECIFIC ELEVATION OF LEVELS OF TRANSAMINASE AND LACTIC ACID DEHYDROGENASE [LDH]: ICD-10-CM

## 2019-12-24 DIAGNOSIS — K76.0 FATTY (CHANGE OF) LIVER, NOT ELSEWHERE CLASSIFIED: ICD-10-CM

## 2019-12-31 ENCOUNTER — OUTPATIENT (OUTPATIENT)
Dept: OUTPATIENT SERVICES | Facility: HOSPITAL | Age: 39
LOS: 1 days | Discharge: HOME | End: 2019-12-31
Payer: MEDICAID

## 2019-12-31 PROCEDURE — 92014 COMPRE OPH EXAM EST PT 1/>: CPT

## 2020-01-02 DIAGNOSIS — H50.05 ALTERNATING ESOTROPIA: ICD-10-CM

## 2020-01-13 ENCOUNTER — OUTPATIENT (OUTPATIENT)
Dept: OUTPATIENT SERVICES | Facility: HOSPITAL | Age: 40
LOS: 1 days | Discharge: HOME | End: 2020-01-13

## 2020-01-23 ENCOUNTER — APPOINTMENT (OUTPATIENT)
Dept: PODIATRY | Facility: HOSPITAL | Age: 40
End: 2020-01-23
Payer: MEDICAID

## 2020-01-23 ENCOUNTER — OUTPATIENT (OUTPATIENT)
Dept: OUTPATIENT SERVICES | Facility: HOSPITAL | Age: 40
LOS: 1 days | Discharge: HOME | End: 2020-01-23

## 2020-01-23 DIAGNOSIS — Q90.9 DOWN SYNDROME, UNSPECIFIED: ICD-10-CM

## 2020-01-23 DIAGNOSIS — B35.1 TINEA UNGUIUM: ICD-10-CM

## 2020-01-23 DIAGNOSIS — L85.3 XEROSIS CUTIS: ICD-10-CM

## 2020-01-23 DIAGNOSIS — B35.3 TINEA PEDIS: ICD-10-CM

## 2020-01-23 DIAGNOSIS — M79.674 PAIN IN RIGHT TOE(S): ICD-10-CM

## 2020-01-23 DIAGNOSIS — M79.675 PAIN IN RIGHT TOE(S): ICD-10-CM

## 2020-01-23 DIAGNOSIS — F79 UNSPECIFIED INTELLECTUAL DISABILITIES: ICD-10-CM

## 2020-01-23 PROCEDURE — 11721 DEBRIDE NAIL 6 OR MORE: CPT | Mod: NC

## 2020-01-23 PROCEDURE — 99213 OFFICE O/P EST LOW 20 MIN: CPT | Mod: NC,25

## 2020-01-23 RX ORDER — CLOTRIMAZOLE 10 MG/G
1 CREAM TOPICAL
Qty: 1 | Refills: 3 | Status: ACTIVE | COMMUNITY
Start: 2019-05-09 | End: 1900-01-01

## 2020-01-23 NOTE — PROCEDURE
[FreeTextEntry1] : manual debridement of all diseased nails, mycotic nails x times ten\par debridement of all hyperkeratotic lesion \par continue ammonium lactate apply to bilateral feet twice daily top and bottom\par rx clotrimazole to be applied as discussed in detail to b/l feet due to fungal infection place on bottom of feet \par follow up in two months for foot care.

## 2020-02-11 LAB
ALBUMIN SERPL ELPH-MCNC: 4.1 G/DL
ALP BLD-CCNC: 82 U/L
ALT SERPL-CCNC: 46 U/L
ANION GAP SERPL CALC-SCNC: 13 MMOL/L
AST SERPL-CCNC: 26 U/L
BASOPHILS # BLD AUTO: 0.05 K/UL
BASOPHILS NFR BLD AUTO: 1.6 %
BILIRUB SERPL-MCNC: 0.4 MG/DL
BUN SERPL-MCNC: 15 MG/DL
CALCIUM SERPL-MCNC: 9 MG/DL
CHLORIDE SERPL-SCNC: 102 MMOL/L
CHOLEST SERPL-MCNC: 160 MG/DL
CHOLEST/HDLC SERPL: 4 RATIO
CO2 SERPL-SCNC: 27 MMOL/L
CREAT SERPL-MCNC: 1.2 MG/DL
EOSINOPHIL # BLD AUTO: 0.08 K/UL
EOSINOPHIL NFR BLD AUTO: 2.6 %
GLUCOSE SERPL-MCNC: 92 MG/DL
HCT VFR BLD CALC: 52.1 %
HDLC SERPL-MCNC: 40 MG/DL
HGB BLD-MCNC: 16.7 G/DL
IMM GRANULOCYTES NFR BLD AUTO: 0.6 %
LDLC SERPL CALC-MCNC: 99 MG/DL
LYMPHOCYTES # BLD AUTO: 1.43 K/UL
LYMPHOCYTES NFR BLD AUTO: 46.1 %
MAN DIFF?: NORMAL
MCHC RBC-ENTMCNC: 28.9 PG
MCHC RBC-ENTMCNC: 32.1 G/DL
MCV RBC AUTO: 90.1 FL
MONOCYTES # BLD AUTO: 0.42 K/UL
MONOCYTES NFR BLD AUTO: 13.5 %
NEUTROPHILS # BLD AUTO: 1.1 K/UL
NEUTROPHILS NFR BLD AUTO: 35.6 %
PLATELET # BLD AUTO: 219 K/UL
POTASSIUM SERPL-SCNC: 4.6 MMOL/L
PROT SERPL-MCNC: 7.2 G/DL
RBC # BLD: 5.78 M/UL
RBC # FLD: 16.4 %
SODIUM SERPL-SCNC: 142 MMOL/L
TRIGL SERPL-MCNC: 200 MG/DL
WBC # FLD AUTO: 3.1 K/UL

## 2020-02-12 LAB — 25(OH)D3 SERPL-MCNC: 28 NG/ML

## 2020-03-02 ENCOUNTER — OUTPATIENT (OUTPATIENT)
Dept: OUTPATIENT SERVICES | Facility: HOSPITAL | Age: 40
LOS: 1 days | Discharge: HOME | End: 2020-03-02

## 2020-03-03 DIAGNOSIS — K05.6 PERIODONTAL DISEASE, UNSPECIFIED: ICD-10-CM

## 2020-03-19 ENCOUNTER — RX RENEWAL (OUTPATIENT)
Age: 40
End: 2020-03-19

## 2020-03-23 ENCOUNTER — EMERGENCY (EMERGENCY)
Facility: HOSPITAL | Age: 40
LOS: 0 days | Discharge: HOME | End: 2020-03-24
Attending: EMERGENCY MEDICINE | Admitting: EMERGENCY MEDICINE
Payer: MEDICAID

## 2020-03-23 ENCOUNTER — EMERGENCY (EMERGENCY)
Facility: HOSPITAL | Age: 40
LOS: 0 days | Discharge: HOME | End: 2020-03-23
Attending: EMERGENCY MEDICINE | Admitting: EMERGENCY MEDICINE
Payer: MEDICAID

## 2020-03-23 VITALS
OXYGEN SATURATION: 98 % | TEMPERATURE: 99 F | RESPIRATION RATE: 18 BRPM | DIASTOLIC BLOOD PRESSURE: 77 MMHG | SYSTOLIC BLOOD PRESSURE: 109 MMHG | HEART RATE: 112 BPM

## 2020-03-23 VITALS
DIASTOLIC BLOOD PRESSURE: 71 MMHG | HEART RATE: 119 BPM | OXYGEN SATURATION: 97 % | TEMPERATURE: 101 F | SYSTOLIC BLOOD PRESSURE: 124 MMHG | RESPIRATION RATE: 20 BRPM

## 2020-03-23 VITALS — HEART RATE: 100 BPM

## 2020-03-23 DIAGNOSIS — R00.0 TACHYCARDIA, UNSPECIFIED: ICD-10-CM

## 2020-03-23 DIAGNOSIS — E78.00 PURE HYPERCHOLESTEROLEMIA, UNSPECIFIED: ICD-10-CM

## 2020-03-23 DIAGNOSIS — D72.829 ELEVATED WHITE BLOOD CELL COUNT, UNSPECIFIED: ICD-10-CM

## 2020-03-23 DIAGNOSIS — R50.9 FEVER, UNSPECIFIED: ICD-10-CM

## 2020-03-23 DIAGNOSIS — Z03.818 ENCOUNTER FOR OBSERVATION FOR SUSPECTED EXPOSURE TO OTHER BIOLOGICAL AGENTS RULED OUT: ICD-10-CM

## 2020-03-23 DIAGNOSIS — R74.0 NONSPECIFIC ELEVATION OF LEVELS OF TRANSAMINASE AND LACTIC ACID DEHYDROGENASE [LDH]: ICD-10-CM

## 2020-03-23 DIAGNOSIS — Z79.899 OTHER LONG TERM (CURRENT) DRUG THERAPY: ICD-10-CM

## 2020-03-23 DIAGNOSIS — F03.90 UNSPECIFIED DEMENTIA WITHOUT BEHAVIORAL DISTURBANCE: ICD-10-CM

## 2020-03-23 DIAGNOSIS — B34.9 VIRAL INFECTION, UNSPECIFIED: ICD-10-CM

## 2020-03-23 DIAGNOSIS — R11.2 NAUSEA WITH VOMITING, UNSPECIFIED: ICD-10-CM

## 2020-03-23 DIAGNOSIS — Z02.9 ENCOUNTER FOR ADMINISTRATIVE EXAMINATIONS, UNSPECIFIED: ICD-10-CM

## 2020-03-23 LAB
ALBUMIN SERPL ELPH-MCNC: 4.4 G/DL — SIGNIFICANT CHANGE UP (ref 3.5–5.2)
ALP SERPL-CCNC: 122 U/L — HIGH (ref 30–115)
ALT FLD-CCNC: 71 U/L — HIGH (ref 0–41)
ANION GAP SERPL CALC-SCNC: 15 MMOL/L — HIGH (ref 7–14)
AST SERPL-CCNC: 46 U/L — HIGH (ref 0–41)
BASOPHILS # BLD AUTO: 0.02 K/UL — SIGNIFICANT CHANGE UP (ref 0–0.2)
BASOPHILS NFR BLD AUTO: 0.7 % — SIGNIFICANT CHANGE UP (ref 0–1)
BILIRUB SERPL-MCNC: 0.3 MG/DL — SIGNIFICANT CHANGE UP (ref 0.2–1.2)
BUN SERPL-MCNC: 17 MG/DL — SIGNIFICANT CHANGE UP (ref 10–20)
CALCIUM SERPL-MCNC: 9.5 MG/DL — SIGNIFICANT CHANGE UP (ref 8.5–10.1)
CHLORIDE SERPL-SCNC: 100 MMOL/L — SIGNIFICANT CHANGE UP (ref 98–110)
CO2 SERPL-SCNC: 24 MMOL/L — SIGNIFICANT CHANGE UP (ref 17–32)
CREAT SERPL-MCNC: 1.3 MG/DL — SIGNIFICANT CHANGE UP (ref 0.7–1.5)
EOSINOPHIL # BLD AUTO: 0 K/UL — SIGNIFICANT CHANGE UP (ref 0–0.7)
EOSINOPHIL NFR BLD AUTO: 0 % — SIGNIFICANT CHANGE UP (ref 0–8)
GLUCOSE SERPL-MCNC: 97 MG/DL — SIGNIFICANT CHANGE UP (ref 70–99)
HCT VFR BLD CALC: 52.4 % — HIGH (ref 42–52)
HGB BLD-MCNC: 17.3 G/DL — SIGNIFICANT CHANGE UP (ref 14–18)
IMM GRANULOCYTES NFR BLD AUTO: 0.4 % — HIGH (ref 0.1–0.3)
LACTATE SERPL-SCNC: 1 MMOL/L — SIGNIFICANT CHANGE UP (ref 0.7–2)
LIDOCAIN IGE QN: 25 U/L — SIGNIFICANT CHANGE UP (ref 7–60)
LYMPHOCYTES # BLD AUTO: 0.76 K/UL — LOW (ref 1.2–3.4)
LYMPHOCYTES # BLD AUTO: 27.7 % — SIGNIFICANT CHANGE UP (ref 20.5–51.1)
MAGNESIUM SERPL-MCNC: 1.8 MG/DL — SIGNIFICANT CHANGE UP (ref 1.8–2.4)
MCHC RBC-ENTMCNC: 28.8 PG — SIGNIFICANT CHANGE UP (ref 27–31)
MCHC RBC-ENTMCNC: 33 G/DL — SIGNIFICANT CHANGE UP (ref 32–37)
MCV RBC AUTO: 87.2 FL — SIGNIFICANT CHANGE UP (ref 80–94)
MONOCYTES # BLD AUTO: 0.34 K/UL — SIGNIFICANT CHANGE UP (ref 0.1–0.6)
MONOCYTES NFR BLD AUTO: 12.4 % — HIGH (ref 1.7–9.3)
NEUTROPHILS # BLD AUTO: 1.61 K/UL — SIGNIFICANT CHANGE UP (ref 1.4–6.5)
NEUTROPHILS NFR BLD AUTO: 58.8 % — SIGNIFICANT CHANGE UP (ref 42.2–75.2)
NRBC # BLD: 0 /100 WBCS — SIGNIFICANT CHANGE UP (ref 0–0)
PLATELET # BLD AUTO: 156 K/UL — SIGNIFICANT CHANGE UP (ref 130–400)
POTASSIUM SERPL-MCNC: 4.3 MMOL/L — SIGNIFICANT CHANGE UP (ref 3.5–5)
POTASSIUM SERPL-SCNC: 4.3 MMOL/L — SIGNIFICANT CHANGE UP (ref 3.5–5)
PROT SERPL-MCNC: 7.9 G/DL — SIGNIFICANT CHANGE UP (ref 6–8)
RBC # BLD: 6.01 M/UL — SIGNIFICANT CHANGE UP (ref 4.7–6.1)
RBC # FLD: 16.4 % — HIGH (ref 11.5–14.5)
SODIUM SERPL-SCNC: 139 MMOL/L — SIGNIFICANT CHANGE UP (ref 135–146)
WBC # BLD: 2.74 K/UL — LOW (ref 4.8–10.8)
WBC # FLD AUTO: 2.74 K/UL — LOW (ref 4.8–10.8)

## 2020-03-23 PROCEDURE — 71045 X-RAY EXAM CHEST 1 VIEW: CPT | Mod: 26

## 2020-03-23 PROCEDURE — 99284 EMERGENCY DEPT VISIT MOD MDM: CPT

## 2020-03-23 PROCEDURE — 99285 EMERGENCY DEPT VISIT HI MDM: CPT

## 2020-03-23 RX ORDER — SODIUM CHLORIDE 9 MG/ML
1000 INJECTION INTRAMUSCULAR; INTRAVENOUS; SUBCUTANEOUS ONCE
Refills: 0 | Status: COMPLETED | OUTPATIENT
Start: 2020-03-23 | End: 2020-03-23

## 2020-03-23 RX ORDER — ERGOCALCIFEROL 1.25 MG/1
0 CAPSULE ORAL
Qty: 0 | Refills: 0 | DISCHARGE

## 2020-03-23 RX ORDER — ATORVASTATIN CALCIUM 80 MG/1
0 TABLET, FILM COATED ORAL
Qty: 0 | Refills: 0 | DISCHARGE

## 2020-03-23 RX ORDER — SOD,AMMONIUM,POTASSIUM LACTATE
0 CREAM (GRAM) TOPICAL
Qty: 0 | Refills: 0 | DISCHARGE

## 2020-03-23 RX ORDER — FAMOTIDINE 10 MG/ML
20 INJECTION INTRAVENOUS ONCE
Refills: 0 | Status: COMPLETED | OUTPATIENT
Start: 2020-03-23 | End: 2020-03-23

## 2020-03-23 RX ORDER — ONDANSETRON 8 MG/1
4 TABLET, FILM COATED ORAL ONCE
Refills: 0 | Status: COMPLETED | OUTPATIENT
Start: 2020-03-23 | End: 2020-03-23

## 2020-03-23 RX ORDER — ACETAMINOPHEN 500 MG
975 TABLET ORAL ONCE
Refills: 0 | Status: COMPLETED | OUTPATIENT
Start: 2020-03-23 | End: 2020-03-23

## 2020-03-23 RX ADMIN — FAMOTIDINE 100 MILLIGRAM(S): 10 INJECTION INTRAVENOUS at 23:34

## 2020-03-23 RX ADMIN — SODIUM CHLORIDE 1000 MILLILITER(S): 9 INJECTION INTRAMUSCULAR; INTRAVENOUS; SUBCUTANEOUS at 23:33

## 2020-03-23 RX ADMIN — Medication 975 MILLIGRAM(S): at 23:34

## 2020-03-23 RX ADMIN — ONDANSETRON 4 MILLIGRAM(S): 8 TABLET, FILM COATED ORAL at 23:34

## 2020-03-23 NOTE — ED PROVIDER NOTE - OBJECTIVE STATEMENT
38 y/o male with a PMH of hypercholesterolemia, and developmental delay from [facility] presents to the ED for evaluation of vomiting yesterday and fever of 100F earlier today. As per staff member, pt has not vomited today but was refusing to eat breakfast. staff member states pt is acting normal self. as per staff denies pt having nasal congestion, complaining of abdominal pain, headaches, dizziness, or difficulty urinating.

## 2020-03-23 NOTE — ED PROVIDER NOTE - CLINICAL SUMMARY MEDICAL DECISION MAKING FREE TEXT BOX
39M with developmental delay p/w fever n/v/ concern for gastritis. pt is well appearing on exam. Vitals reviewed to be wnl. Physical-nad,perrl,mmm,rrr,ctab,abd softntnd,fromx4,anox3 tolerated po in the ER with no n/v. dc home with pmd f/u

## 2020-03-23 NOTE — ED PROVIDER NOTE - TOBACCO USE
CONSTITUTIONAL: Well-developed; well-nourished; in no acute distress.   SKIN: warm, dry  HEAD: 7cm vertical laceration from mid forehead to scalp, no active bleeding noted; 1 cm laceration with flap over right cheek, no active bleeding noted   EYES: no gross trauma bilaterally, no proptosis  ENT: No nasal discharge; airway clear. no racoon eyes no richard sign  NECK: no midline tenderness, normal ROM  CHEST: no crepitus or bruising  CARD: S1, S2 normal; no murmurs, gallops, or rubs. Regular rate and rhythm.   RESP: No wheezes, rales or rhonchi.  ABD: soft ntnd  BACK: no midline tenderness or step offs  PELVIS: no laxity with lateral compression  EXT: no gross extremity injury  NEURO: gcs 15, moving all extremities grossly, following commands  PSYCH: Cooperative, appropriate
Never smoker

## 2020-03-23 NOTE — ED ADULT TRIAGE NOTE - CHIEF COMPLAINT QUOTE
Pt is special needs and arrives from Group home. Pt has had a fever, cough and diarrhea for one week.

## 2020-03-23 NOTE — ED PROVIDER NOTE - PHYSICAL EXAMINATION
Physical Exam    Vital Signs: I have reviewed the initial vital signs.  Constitutional: well-nourished, appears stated age, no acute distress  ENT: Oropharynx without erythema, tonsillar edema, or exudates. Uvula midline. No LAD  Cardiovascular: tachycardic, regular rhythm, well-perfused extremities, radial pulses equal and 2+  Respiratory: unlabored respiratory effort, clear to auscultation bilaterally no wheezing, rales and rhonchi. no accessory muscle use.   Gastrointestinal: soft, non-tender, non-distended abdomen, no pulsatile mass, no rebound. no guarding.   Integumentary: warm, dry, no rash

## 2020-03-23 NOTE — ED PROVIDER NOTE - NS ED ROS FT
As per staff member,   CONST: (+) fever. No chills or bodyaches  EYES: No pain, redness, drainage  ENT: No ear discharge, nasal discharge or congestion.   CARD: No chest pain,   RESP: No SOB, cough, hemoptysis. No hx of asthma or COPD  GI: (+) vomiting. No abdominal pain, N/D  MS: No joint pain, back pain or extremity pain/injury  SKIN: No rashes  NEURO: No headache, LOC

## 2020-03-23 NOTE — ED PROVIDER NOTE - PROGRESS NOTE DETAILS
pt tolerated two cups of apple juice. pt refusing rectal temperature, oral temp is 99F. pt has not vomited today. advised of return precautions such as inability to tolerate po, abdominal pain, continue vomiting, diarrhea. agreeable to dc.

## 2020-03-23 NOTE — ED PROVIDER NOTE - NSFOLLOWUPINSTRUCTIONS_ED_ALL_ED_FT

## 2020-03-23 NOTE — ED PROVIDER NOTE - PATIENT PORTAL LINK FT
You can access the FollowMyHealth Patient Portal offered by [facility] by registering at the following website: [email or web address]. By joining [employer]’s FollowMyHealth portal, you will also be able to view your health information using other applications (apps) compatible with our system.

## 2020-03-24 VITALS
DIASTOLIC BLOOD PRESSURE: 68 MMHG | OXYGEN SATURATION: 98 % | HEART RATE: 101 BPM | RESPIRATION RATE: 20 BRPM | SYSTOLIC BLOOD PRESSURE: 128 MMHG

## 2020-03-24 PROBLEM — E78.00 PURE HYPERCHOLESTEROLEMIA, UNSPECIFIED: Chronic | Status: ACTIVE | Noted: 2020-03-23

## 2020-03-24 LAB
APPEARANCE UR: CLEAR — SIGNIFICANT CHANGE UP
BACTERIA # UR AUTO: ABNORMAL
BILIRUB UR-MCNC: ABNORMAL
COLOR SPEC: YELLOW — SIGNIFICANT CHANGE UP
COMMENT - URINE: SIGNIFICANT CHANGE UP
DIFF PNL FLD: NEGATIVE — SIGNIFICANT CHANGE UP
EPI CELLS # UR: ABNORMAL /HPF
GLUCOSE UR QL: NEGATIVE MG/DL — SIGNIFICANT CHANGE UP
HYALINE CASTS # UR AUTO: ABNORMAL /LPF
KETONES UR-MCNC: 40
LEUKOCYTE ESTERASE UR-ACNC: NEGATIVE — SIGNIFICANT CHANGE UP
MANUAL SMEAR VERIFICATION: SIGNIFICANT CHANGE UP
NEUTS BAND # BLD: 6 % — SIGNIFICANT CHANGE UP (ref 0–6)
NITRITE UR-MCNC: NEGATIVE — SIGNIFICANT CHANGE UP
NRBC # BLD: 0 /100 — SIGNIFICANT CHANGE UP (ref 0–0)
PH UR: 5.5 — SIGNIFICANT CHANGE UP (ref 5–8)
PLAT MORPH BLD: NORMAL — SIGNIFICANT CHANGE UP
PLATELET COUNT - ESTIMATE: NORMAL — SIGNIFICANT CHANGE UP
PROT UR-MCNC: 30 MG/DL
RBC BLD AUTO: NORMAL — SIGNIFICANT CHANGE UP
RBC CASTS # UR COMP ASSIST: SIGNIFICANT CHANGE UP /HPF
SP GR SPEC: 1.02 — SIGNIFICANT CHANGE UP (ref 1.01–1.03)
UROBILINOGEN FLD QL: 0.2 MG/DL — SIGNIFICANT CHANGE UP (ref 0.2–0.2)
VARIANT LYMPHS # BLD: 4 % — SIGNIFICANT CHANGE UP (ref 0–5)
WBC UR QL: SIGNIFICANT CHANGE UP /HPF

## 2020-03-24 PROCEDURE — 74177 CT ABD & PELVIS W/CONTRAST: CPT | Mod: 26

## 2020-03-24 RX ORDER — MIDAZOLAM HYDROCHLORIDE 1 MG/ML
5 INJECTION, SOLUTION INTRAMUSCULAR; INTRAVENOUS ONCE
Refills: 0 | Status: DISCONTINUED | OUTPATIENT
Start: 2020-03-24 | End: 2020-03-24

## 2020-03-24 RX ADMIN — MIDAZOLAM HYDROCHLORIDE 5 MILLIGRAM(S): 1 INJECTION, SOLUTION INTRAMUSCULAR; INTRAVENOUS at 02:57

## 2020-03-24 NOTE — ED PROVIDER NOTE - OBJECTIVE STATEMENT
39 year old male past medical history of mental disability comes to emergency room for fever, cough and vomiting. as per group home aide patient is having coughing episodes followed by vomiting episodes.

## 2020-03-24 NOTE — ED PROVIDER NOTE - PROGRESS NOTE DETAILS
staff Netta  from  -  aware of  need for patient to be quarantined-  does not have any roomates -  -  will send home with masks if patient  wanders in  . possible  covid virus Netta verbalizes understanding of instructions

## 2020-03-24 NOTE — ED PROVIDER NOTE - CLINICAL SUMMARY MEDICAL DECISION MAKING FREE TEXT BOX
39yM  hx  mental retardation, hld   present from  for  vomiting- in ED pt with post tussive vomiting  -  well appearing no distress cvs tachy resp cta no tachypnea,  no distress, abd soft nontender  skin no rash.  labs reviewed leukopenia, + transaminitis, CT nonspecific right lower lobe ill-defined ground glass attenuation, which may be secondary to incomplete inspiration or may be infectious etiology. 2. No evidence of acute intra-abdominal pathology.   Likely viral - dw group home - self qurarntine  for possible  covid  no testing in ER -  can test outpt  Patient to be discharged from ED well appearing. Any available test results were discussed with staff  Verbal instructions given, including instructions to return to ED immediately for any new, worsening, or concerning symptoms. Limitations of ED work up discussed.  Parent reports understanding of above with capacity and insight. Written discharge instructions additionally given, including follow-up plan.

## 2020-03-24 NOTE — ED PROVIDER NOTE - PHYSICAL EXAMINATION
I am unable to obtain a comprehensive history, review of systems, past medical history, and/or physical exam due to constraints imposed by the urgency of the patient's clinical condition and/or mental status.     Physical Exam    Vital Signs: I have reviewed the initial vital signs.  Constitutional: well-nourished, appears stated age, no acute distress  Eyes: Conjunctiva pink, Sclera clear, PERRLA, EOMI.  Cardiovascular: S1 and S2, regular rate, regular rhythm, well-perfused extremities, radial pulses equal and 2+  Respiratory: unlabored respiratory effort, clear to auscultation bilaterally no wheezing, rales and rhonchi  Gastrointestinal: soft, non-tender abdomen, no pulsatile mass, normal bowl sounds  Musculoskeletal: supple neck, no lower extremity edema, no midline tenderness  Integumentary: warm, dry, no rash  Neurologic: awake, alert, extremities’ motor and sensory functions grossly intact

## 2020-03-24 NOTE — ED PROVIDER NOTE - NSFOLLOWUPINSTRUCTIONS_ED_ALL_ED_FT
You must be self quarantined for 14 days -  wear mask if  outside of your room  RETURN TO ED  FOR ANY NEW WORSENING OR CONCERNING SYMPTOMS TO YOU, ALSO AS WE DISCUSSED.        You are being discharged with viral illness diagnosis and do not require hospitalization.  At this time, only patients who are being hospitalized are tested for COVID-19.    If you are well enough to be discharged home and are not in a high risk group to be admitted, you should care for yourself at home exactly like you would if you have Influenza “flu”. Follow all the standard guidelines about washing your hands, covering your cough, etc. If you feel unwell, stay home, rest and drink plenty of clear fluids. Keep track of your symptoms. You should return to the Emergency Department if you develop worse symptoms, trouble breathing, chest pain, and/or a fever that doesn’t improve with over the counter medications.    Please consider going through the drive-through testing unless you are severely ill and need to go to the ED.  -through testing is available at various location, including [place].  Call [facility] at  [phone] to make an appointment.    How to Set Up Your Home for Self-Quarantine or Self-Isolation    Please refer to this helpful video.   [email or web address]      Viral Illness, Adult  Viruses are tiny germs that can get into a person's body and cause illness. There are many different types of viruses, and they cause many types of illness. Viral illnesses can range from mild to severe. They can affect various parts of the body.    Common illnesses that are caused by a virus include colds and the flu. Viral illnesses also include serious conditions such as HIV/AIDS (human immunodeficiency virus/acquired immunodeficiency syndrome). A few viruses have been linked to certain cancers.    What are the causes?  Many types of viruses can cause illness. Viruses invade cells in your body, multiply, and cause the infected cells to malfunction or die. When the cell dies, it releases more of the virus. When this happens, you develop symptoms of the illness, and the virus continues to spread to other cells. If the virus takes over the function of the cell, it can cause the cell to divide and grow out of control, as is the case when a virus causes cancer.    Different viruses get into the body in different ways. You can get a virus by:    Swallowing food or water that is contaminated with the virus.  Breathing in droplets that have been coughed or sneezed into the air by an infected person.  Touching a surface that has been contaminated with the virus and then touching your eyes, nose, or mouth.  Being bitten by an insect or animal that carries the virus.  Having sexual contact with a person who is infected with the virus.  Being exposed to blood or fluids that contain the virus, either through an open cut or during a transfusion.    If a virus enters your body, your body's defense system (immune system) will try to fight the virus. You may be at higher risk for a viral illness if your immune system is weak.    What are the signs or symptoms?  Symptoms vary depending on the type of virus and the location of the cells that it invades. Common symptoms of the main types of viral illnesses include:    Cold and flu viruses     Fever.  Headache.  Sore throat.  Muscle aches.  Nasal congestion.  Cough.  Digestive system (gastrointestinal) viruses     Fever.  Abdominal pain.  Nausea.  Diarrhea.  Liver viruses (hepatitis)     Loss of appetite.  Tiredness.  Yellowing of the skin (jaundice).  Brain and spinal cord viruses     Fever.  Headache.  Stiff neck.  Nausea and vomiting.  Confusion or sleepiness.  Skin viruses     Warts.  Itching.  Rash.  Sexually transmitted viruses     Discharge.  Swelling.  Redness.  Rash.  How is this treated?  Viruses can be difficult to treat because they live within cells. Antibiotic medicines do not treat viruses because these drugs do not get inside cells. Treatment for a viral illness may include:    Resting and drinking plenty of fluids.  Medicines to relieve symptoms. These can include over-the-counter medicine for pain and fever, medicines for cough or congestion, and medicines to relieve diarrhea.  Antiviral medicines. These drugs are available only for certain types of viruses. They may help reduce flu symptoms if taken early. There are also many antiviral medicines for hepatitis and HIV/AIDS.    Some viral illnesses can be prevented with vaccinations. A common example is the flu shot.    Follow these instructions at home:  Medicines     Image   Take over-the-counter and prescription medicines only as told by your health care provider.  If you were prescribed an antiviral medicine, take it as told by your health care provider. Do not stop taking the medicine even if you start to feel better.  Be aware of when antibiotics are needed and when they are not needed. Antibiotics do not treat viruses. If your health care provider thinks that you may have a bacterial infection as well as a viral infection, you may get an antibiotic.    Do not ask for an antibiotic prescription if you have been diagnosed with a viral illness. That will not make your illness go away faster.  Frequently taking antibiotics when they are not needed can lead to antibiotic resistance. When this develops, the medicine no longer works against the bacteria that it normally fights.    General instructions     Drink enough fluids to keep your urine clear or pale yellow.  Rest as much as possible.  Return to your normal activities as told by your health care provider. Ask your health care provider what activities are safe for you.  Keep all follow-up visits as told by your health care provider. This is important.  How is this prevented?  ImageTake these actions to reduce your risk of viral infection:    Eat a healthy diet and get enough rest.  Wash your hands often with soap and water. This is especially important when you are in public places. If soap and water are not available, use hand .  Avoid close contact with friends and family who have a viral illness.  If you travel to areas where viral gastrointestinal infection is common, avoid drinking water or eating raw food.  Keep your immunizations up to date. Get a flu shot every year as told by your health care provider.  Do not share toothbrushes, nail clippers, razors, or needles with other people.  Always practice safe sex.    Contact a health care provider if:  You have symptoms of a viral illness that do not go away.  Your symptoms come back after going away.  Your symptoms get worse.  Get help right away if:  You have trouble breathing.  You have a severe headache or a stiff neck.  You have severe vomiting or abdominal pain.  This information is not intended to replace advice given to you by your health care provider. Make sure you discuss any questions you have with your health care provider.

## 2020-03-24 NOTE — ED PROVIDER NOTE - PATIENT PORTAL LINK FT
You can access the FollowMyHealth Patient Portal offered by [facility] by registering at the following website: [email or web address]. By joining [employer]’s FollowMyHealth portal, you will also be able to view your health information using other applications (apps) compatible with our system.

## 2020-03-25 LAB
CULTURE RESULTS: SIGNIFICANT CHANGE UP
SPECIMEN SOURCE: SIGNIFICANT CHANGE UP

## 2020-03-26 ENCOUNTER — APPOINTMENT (OUTPATIENT)
Dept: PODIATRY | Facility: HOSPITAL | Age: 40
End: 2020-03-26

## 2020-03-28 ENCOUNTER — INPATIENT (INPATIENT)
Facility: HOSPITAL | Age: 40
LOS: 10 days | End: 2020-04-08
Attending: INTERNAL MEDICINE | Admitting: INTERNAL MEDICINE
Payer: MEDICAID

## 2020-03-28 VITALS
RESPIRATION RATE: 36 BRPM | HEART RATE: 109 BPM | OXYGEN SATURATION: 76 % | SYSTOLIC BLOOD PRESSURE: 116 MMHG | WEIGHT: 198.42 LBS | TEMPERATURE: 97 F | DIASTOLIC BLOOD PRESSURE: 65 MMHG

## 2020-03-28 PROBLEM — F79 UNSPECIFIED INTELLECTUAL DISABILITIES: Chronic | Status: ACTIVE | Noted: 2020-03-23

## 2020-03-28 LAB
ALBUMIN SERPL ELPH-MCNC: 3.6 G/DL — SIGNIFICANT CHANGE UP (ref 3.5–5.2)
ALP SERPL-CCNC: 68 U/L — SIGNIFICANT CHANGE UP (ref 30–115)
ALT FLD-CCNC: 25 U/L — SIGNIFICANT CHANGE UP (ref 0–41)
ANION GAP SERPL CALC-SCNC: 14 MMOL/L — SIGNIFICANT CHANGE UP (ref 7–14)
APPEARANCE UR: ABNORMAL
APTT BLD: 30.6 SEC — SIGNIFICANT CHANGE UP (ref 27–39.2)
AST SERPL-CCNC: 42 U/L — HIGH (ref 0–41)
BACTERIA # UR AUTO: ABNORMAL
BASOPHILS # BLD AUTO: 0.01 K/UL — SIGNIFICANT CHANGE UP (ref 0–0.2)
BASOPHILS NFR BLD AUTO: 0.4 % — SIGNIFICANT CHANGE UP (ref 0–1)
BILIRUB SERPL-MCNC: 0.5 MG/DL — SIGNIFICANT CHANGE UP (ref 0.2–1.2)
BILIRUB UR-MCNC: ABNORMAL
BUN SERPL-MCNC: 20 MG/DL — SIGNIFICANT CHANGE UP (ref 10–20)
CALCIUM SERPL-MCNC: 8.6 MG/DL — SIGNIFICANT CHANGE UP (ref 8.5–10.1)
CHLORIDE SERPL-SCNC: 99 MMOL/L — SIGNIFICANT CHANGE UP (ref 98–110)
CO2 SERPL-SCNC: 26 MMOL/L — SIGNIFICANT CHANGE UP (ref 17–32)
COLOR SPEC: YELLOW — SIGNIFICANT CHANGE UP
CREAT SERPL-MCNC: 1.2 MG/DL — SIGNIFICANT CHANGE UP (ref 0.7–1.5)
D DIMER BLD IA.RAPID-MCNC: 2092 NG/ML DDU — HIGH (ref 0–230)
DIFF PNL FLD: ABNORMAL
EOSINOPHIL # BLD AUTO: 0 K/UL — SIGNIFICANT CHANGE UP (ref 0–0.7)
EOSINOPHIL NFR BLD AUTO: 0 % — SIGNIFICANT CHANGE UP (ref 0–8)
EPI CELLS # UR: ABNORMAL /HPF
GLUCOSE SERPL-MCNC: 115 MG/DL — HIGH (ref 70–99)
GLUCOSE UR QL: NEGATIVE MG/DL — SIGNIFICANT CHANGE UP
HCT VFR BLD CALC: 48.2 % — SIGNIFICANT CHANGE UP (ref 42–52)
HGB BLD-MCNC: 15.7 G/DL — SIGNIFICANT CHANGE UP (ref 14–18)
HYALINE CASTS # UR AUTO: ABNORMAL /LPF
IMM GRANULOCYTES NFR BLD AUTO: 0.4 % — HIGH (ref 0.1–0.3)
INR BLD: 1.29 RATIO — SIGNIFICANT CHANGE UP (ref 0.65–1.3)
KETONES UR-MCNC: 15
LACTATE SERPL-SCNC: 1.4 MMOL/L — SIGNIFICANT CHANGE UP (ref 0.7–2)
LDH SERPL L TO P-CCNC: 473 U/L — HIGH (ref 50–242)
LEUKOCYTE ESTERASE UR-ACNC: NEGATIVE — SIGNIFICANT CHANGE UP
LYMPHOCYTES # BLD AUTO: 0.71 K/UL — LOW (ref 1.2–3.4)
LYMPHOCYTES # BLD AUTO: 30.1 % — SIGNIFICANT CHANGE UP (ref 20.5–51.1)
MAGNESIUM SERPL-MCNC: 2.2 MG/DL — SIGNIFICANT CHANGE UP (ref 1.8–2.4)
MCHC RBC-ENTMCNC: 28.9 PG — SIGNIFICANT CHANGE UP (ref 27–31)
MCHC RBC-ENTMCNC: 32.6 G/DL — SIGNIFICANT CHANGE UP (ref 32–37)
MCV RBC AUTO: 88.8 FL — SIGNIFICANT CHANGE UP (ref 80–94)
MONOCYTES # BLD AUTO: 0.18 K/UL — SIGNIFICANT CHANGE UP (ref 0.1–0.6)
MONOCYTES NFR BLD AUTO: 7.6 % — SIGNIFICANT CHANGE UP (ref 1.7–9.3)
NEUTROPHILS # BLD AUTO: 1.45 K/UL — SIGNIFICANT CHANGE UP (ref 1.4–6.5)
NEUTROPHILS NFR BLD AUTO: 61.5 % — SIGNIFICANT CHANGE UP (ref 42.2–75.2)
NITRITE UR-MCNC: NEGATIVE — SIGNIFICANT CHANGE UP
NRBC # BLD: 0 /100 WBCS — SIGNIFICANT CHANGE UP (ref 0–0)
PH UR: 6 — SIGNIFICANT CHANGE UP (ref 5–8)
PLATELET # BLD AUTO: 138 K/UL — SIGNIFICANT CHANGE UP (ref 130–400)
POTASSIUM SERPL-MCNC: 4.5 MMOL/L — SIGNIFICANT CHANGE UP (ref 3.5–5)
POTASSIUM SERPL-SCNC: 4.5 MMOL/L — SIGNIFICANT CHANGE UP (ref 3.5–5)
PROT SERPL-MCNC: 6.9 G/DL — SIGNIFICANT CHANGE UP (ref 6–8)
PROT UR-MCNC: >=300 MG/DL
PROTHROM AB SERPL-ACNC: 14.8 SEC — HIGH (ref 9.95–12.87)
RBC # BLD: 5.43 M/UL — SIGNIFICANT CHANGE UP (ref 4.7–6.1)
RBC # FLD: 16.3 % — HIGH (ref 11.5–14.5)
RBC CASTS # UR COMP ASSIST: >50 /HPF
SODIUM SERPL-SCNC: 139 MMOL/L — SIGNIFICANT CHANGE UP (ref 135–146)
SP GR SPEC: >=1.03 (ref 1.01–1.03)
TRIGL SERPL-MCNC: 178 MG/DL — HIGH (ref 10–149)
TROPONIN T SERPL-MCNC: <0.01 NG/ML — SIGNIFICANT CHANGE UP
UROBILINOGEN FLD QL: 0.2 MG/DL — SIGNIFICANT CHANGE UP (ref 0.2–0.2)
WBC # BLD: 2.36 K/UL — LOW (ref 4.8–10.8)
WBC # FLD AUTO: 2.36 K/UL — LOW (ref 4.8–10.8)
WBC UR QL: SIGNIFICANT CHANGE UP /HPF

## 2020-03-28 PROCEDURE — 99291 CRITICAL CARE FIRST HOUR: CPT | Mod: 25

## 2020-03-28 PROCEDURE — 71045 X-RAY EXAM CHEST 1 VIEW: CPT | Mod: 26

## 2020-03-28 PROCEDURE — 71045 X-RAY EXAM CHEST 1 VIEW: CPT | Mod: 26,76

## 2020-03-28 PROCEDURE — 31500 INSERT EMERGENCY AIRWAY: CPT

## 2020-03-28 PROCEDURE — 36556 INSERT NON-TUNNEL CV CATH: CPT

## 2020-03-28 PROCEDURE — 99223 1ST HOSP IP/OBS HIGH 75: CPT

## 2020-03-28 RX ORDER — PROPOFOL 10 MG/ML
10 INJECTION, EMULSION INTRAVENOUS
Qty: 500 | Refills: 0 | Status: DISCONTINUED | OUTPATIENT
Start: 2020-03-28 | End: 2020-04-01

## 2020-03-28 RX ORDER — KETAMINE HYDROCHLORIDE 100 MG/ML
200 INJECTION INTRAMUSCULAR; INTRAVENOUS ONCE
Refills: 0 | Status: DISCONTINUED | OUTPATIENT
Start: 2020-03-28 | End: 2020-03-28

## 2020-03-28 RX ORDER — PROPOFOL 10 MG/ML
0.5 INJECTION, EMULSION INTRAVENOUS
Qty: 1000 | Refills: 0 | Status: DISCONTINUED | OUTPATIENT
Start: 2020-03-28 | End: 2020-04-01

## 2020-03-28 RX ORDER — ENOXAPARIN SODIUM 100 MG/ML
40 INJECTION SUBCUTANEOUS DAILY
Refills: 0 | Status: DISCONTINUED | OUTPATIENT
Start: 2020-03-28 | End: 2020-04-02

## 2020-03-28 RX ORDER — PANTOPRAZOLE SODIUM 20 MG/1
40 TABLET, DELAYED RELEASE ORAL DAILY
Refills: 0 | Status: DISCONTINUED | OUTPATIENT
Start: 2020-03-28 | End: 2020-04-08

## 2020-03-28 RX ORDER — KETAMINE HYDROCHLORIDE 100 MG/ML
50 INJECTION INTRAMUSCULAR; INTRAVENOUS ONCE
Refills: 0 | Status: DISCONTINUED | OUTPATIENT
Start: 2020-03-28 | End: 2020-03-28

## 2020-03-28 RX ORDER — ROCURONIUM BROMIDE 10 MG/ML
100 VIAL (ML) INTRAVENOUS ONCE
Refills: 0 | Status: COMPLETED | OUTPATIENT
Start: 2020-03-28 | End: 2020-03-28

## 2020-03-28 RX ORDER — AZITHROMYCIN 500 MG/1
500 TABLET, FILM COATED ORAL EVERY 24 HOURS
Refills: 0 | Status: DISCONTINUED | OUTPATIENT
Start: 2020-03-28 | End: 2020-03-30

## 2020-03-28 RX ORDER — DEXMEDETOMIDINE HYDROCHLORIDE IN 0.9% SODIUM CHLORIDE 4 UG/ML
0.3 INJECTION INTRAVENOUS
Qty: 200 | Refills: 0 | Status: DISCONTINUED | OUTPATIENT
Start: 2020-03-28 | End: 2020-04-01

## 2020-03-28 RX ORDER — FENTANYL CITRATE 50 UG/ML
0.5 INJECTION INTRAVENOUS
Qty: 2500 | Refills: 0 | Status: DISCONTINUED | OUTPATIENT
Start: 2020-03-28 | End: 2020-04-01

## 2020-03-28 RX ORDER — ACETAMINOPHEN 500 MG
650 TABLET ORAL EVERY 4 HOURS
Refills: 0 | Status: DISCONTINUED | OUTPATIENT
Start: 2020-03-28 | End: 2020-04-05

## 2020-03-28 RX ORDER — HYDROXYCHLOROQUINE SULFATE 200 MG
TABLET ORAL
Refills: 0 | Status: COMPLETED | OUTPATIENT
Start: 2020-03-28 | End: 2020-04-02

## 2020-03-28 RX ORDER — HYDROXYCHLOROQUINE SULFATE 200 MG
200 TABLET ORAL EVERY 12 HOURS
Refills: 0 | Status: COMPLETED | OUTPATIENT
Start: 2020-03-29 | End: 2020-04-02

## 2020-03-28 RX ORDER — HYDROXYCHLOROQUINE SULFATE 200 MG
400 TABLET ORAL EVERY 12 HOURS
Refills: 0 | Status: COMPLETED | OUTPATIENT
Start: 2020-03-28 | End: 2020-03-29

## 2020-03-28 RX ORDER — MIDAZOLAM HYDROCHLORIDE 1 MG/ML
0.02 INJECTION, SOLUTION INTRAMUSCULAR; INTRAVENOUS
Qty: 100 | Refills: 0 | Status: DISCONTINUED | OUTPATIENT
Start: 2020-03-28 | End: 2020-04-04

## 2020-03-28 RX ORDER — CEFEPIME 1 G/1
2000 INJECTION, POWDER, FOR SOLUTION INTRAMUSCULAR; INTRAVENOUS EVERY 8 HOURS
Refills: 0 | Status: DISCONTINUED | OUTPATIENT
Start: 2020-03-28 | End: 2020-03-31

## 2020-03-28 RX ORDER — KETAMINE HYDROCHLORIDE 100 MG/ML
100 INJECTION INTRAMUSCULAR; INTRAVENOUS ONCE
Refills: 0 | Status: DISCONTINUED | OUTPATIENT
Start: 2020-03-28 | End: 2020-03-28

## 2020-03-28 RX ORDER — NOREPINEPHRINE BITARTRATE/D5W 8 MG/250ML
0.05 PLASTIC BAG, INJECTION (ML) INTRAVENOUS
Qty: 16 | Refills: 0 | Status: DISCONTINUED | OUTPATIENT
Start: 2020-03-28 | End: 2020-04-08

## 2020-03-28 RX ADMIN — Medication 4.22 MICROGRAM(S)/KG/MIN: at 21:44

## 2020-03-28 RX ADMIN — Medication 100 MILLIGRAM(S): at 16:17

## 2020-03-28 RX ADMIN — PROPOFOL 5.4 MICROGRAM(S)/KG/MIN: 10 INJECTION, EMULSION INTRAVENOUS at 17:24

## 2020-03-28 RX ADMIN — Medication 400 MILLIGRAM(S): at 23:59

## 2020-03-28 RX ADMIN — KETAMINE HYDROCHLORIDE 50 MILLIGRAM(S): 100 INJECTION INTRAMUSCULAR; INTRAVENOUS at 17:45

## 2020-03-28 RX ADMIN — ENOXAPARIN SODIUM 40 MILLIGRAM(S): 100 INJECTION SUBCUTANEOUS at 23:58

## 2020-03-28 RX ADMIN — KETAMINE HYDROCHLORIDE 50 MILLIGRAM(S): 100 INJECTION INTRAMUSCULAR; INTRAVENOUS at 17:55

## 2020-03-28 RX ADMIN — CEFEPIME 100 MILLIGRAM(S): 1 INJECTION, POWDER, FOR SOLUTION INTRAMUSCULAR; INTRAVENOUS at 23:58

## 2020-03-28 RX ADMIN — DEXMEDETOMIDINE HYDROCHLORIDE IN 0.9% SODIUM CHLORIDE 5.96 MICROGRAM(S)/KG/HR: 4 INJECTION INTRAVENOUS at 16:16

## 2020-03-28 RX ADMIN — Medication 100 MILLIGRAM(S): at 18:30

## 2020-03-28 RX ADMIN — AZITHROMYCIN 255 MILLIGRAM(S): 500 TABLET, FILM COATED ORAL at 23:58

## 2020-03-28 RX ADMIN — KETAMINE HYDROCHLORIDE 100 MILLIGRAM(S): 100 INJECTION INTRAMUSCULAR; INTRAVENOUS at 18:30

## 2020-03-28 RX ADMIN — FENTANYL CITRATE 4.5 MICROGRAM(S)/KG/HR: 50 INJECTION INTRAVENOUS at 21:44

## 2020-03-28 RX ADMIN — KETAMINE HYDROCHLORIDE 200 MILLIGRAM(S): 100 INJECTION INTRAMUSCULAR; INTRAVENOUS at 16:26

## 2020-03-28 NOTE — ED PROCEDURE NOTE - CPROC ED TRACHE INTUB DETAIL1
Patient was pre-oxygenated. An endotracheal tube (ETT) was placed through the vocal cords into the trachea.  ETT position was confirmed by auscultation of bilateral breath sounds to all lung fields. ETCO2 level was appropriate.
Patient was pre-oxygenated. An endotracheal tube (ETT) was placed through the vocal cords into the trachea.  ETT position was confirmed by auscultation of bilateral breath sounds to all lung fields. ETCO2 level was appropriate./Patient connected to ventilator with settings as ordered.

## 2020-03-28 NOTE — ED PROVIDER NOTE - ADDITIONAL RISK FACTOR FREE TEXT BOX
38 yo M coming in for fever, respiratory distress and hypoxia. Pt was seen here multiple times in ED last week for vomiting and SOB On presentation pt is cooperative, tachypneic at 30, and hypoxic to 79% on room air. Pt was placed into negative pressure room and given non-rebreather at 8L which improved him to 90%. Then had another event of desaturation, was placed on higher flow at 15L which improved to 95%. Concern for COVID-19 and subsequent deterioration. CXR revealed diffuse b/l infiltrates. Decision was made to intubate the pt. Case discussed with pt’s brother who is a physician, Lamberto Rodgers. Pt given rocuronium and intubated with 7.5 ET tube, placed on the vent, given ABX. ET tube confirmed on CXR. Admitted to ICU.

## 2020-03-28 NOTE — ED PROVIDER NOTE - CLINICAL SUMMARY MEDICAL DECISION MAKING FREE TEXT BOX
38 yo M coming in for fever, respiratory distress and hypoxia. Pt was seen here multiple times in ED last week for vomiting and SOB On presentation pt is cooperative, tachypneic at 30, and hypoxic to 79% on room air. Pt was placed into negative pressure room and given non-rebreather at 8L which improved him to 90%. Then had another event of desaturation, was placed on higher flow at 15L/ cpap which improved to 95%. Concern for COVID-19 and subsequent deterioration. CXR revealed diffuse b/l infiltrates. Decision was made to intubate the pt. Case discussed with pt’s brother who is a physician, Lamberto Rodgers. Pt given rocuronium and intubated with 7.5 ET tube, placed on the vent, given ABX. Review of lab- with hypercarbia. ET tube confirmed on CXR. Admitted to ICU.

## 2020-03-28 NOTE — ED PROVIDER NOTE - OBJECTIVE STATEMENT
39 year old male past medical history of being mentally disabled comes to emergency room for shortness of breath. patient in emergency room several days ago told had covid but not tested. patient over the last day with difficulty breahting and brought to ed.

## 2020-03-28 NOTE — ED PROVIDER NOTE - PHYSICAL EXAMINATION
I am unable to obtain a comprehensive history, review of systems, past medical history, and/or physical exam due to constraints imposed by the urgency of the patient's clinical condition and/or mental status.    Physical Exam    Vital Signs: I have reviewed the initial vital signs.  Constitutional: well-nourished, appears stated age, Severe Distress  Eyes: Conjunctiva pink, Sclera clear, PERRLA, EOMI.  Cardiovascular: S1 and S2, Tachy regular rate, regular rhythm, well-perfused extremities, radial pulses equal and 2+  Respiratory: Distress and decreased breath sound with wheezing  Gastrointestinal: soft, non-tender abdomen, no pulsatile mass, normal bowl sounds  Musculoskeletal: supple neck, no lower extremity edema, no midline tenderness  Integumentary: warm, dry, no rash

## 2020-03-28 NOTE — H&P ADULT - ASSESSMENT
38 yo M with PMHx of developmental delay (nonverbal at baseline), HLD who presents from [facility] for worsening sob. Intubated in ED for acute hypoxemic respiratory failure and admitted to ICU.    #Acute hypoxemic respiratory failure  - f/u COVID results  - ID c/s  - isolation precautions    #HLD  - hold meds for now 38 yo M with PMHx of developmental delay (nonverbal at baseline), HLD who presents from [facility] for worsening sob. Intubated in ED for acute hypoxemic respiratory failure and admitted to ICU.    #Acute hypoxemic respiratory failure  - cxr: bilateral infiltrates  - f/u COVID results  - ID c/s  - isolation precautions  - start hydroxychloroquine  - daily EKG to check QTc  - start cefepime, azithromycin  - pulm c/s    #HLD  - hold meds for now    #GI ppx  - protonix    #DVT ppx  - lovenox    #Dispo  - ICU  - full code

## 2020-03-28 NOTE — H&P ADULT - HISTORY OF PRESENT ILLNESS
40 yo M with PMHx of developmental delay (nonverbal at baseline), HLD who presents from [facility] for sob. Per chart review, pt was seen in the ED on 3/23 for fever and decreased po intake. Labs at that time were notable for WBC 2, AST/ALT 46/71, negative blood cx x2, cxr with bilateral opacities. Pt tolerated po and was d/c'ed back to . Pt returned on 3/24 with fever, cough, and vomiting episodes after coughing. UA/urine cx negative, CT abd negative for acute abd pathology but showed RLL ground glass opacity. Pt was d/c'ed back to  and instructed to quarantine for possible COVID. Pt then returned to ED today with worsening sob over past several days.    In ED, HR 100s, rectal temp 101, normotensive, tachypneic 30s-40s, satting 76% on RA. SpO2 improved to 91% on NRB but pt still tachypneic and not tolerating NRB well so was intubated and started on precedex. Labs notable for WBC 2, AST 42, , trop <0.01. UA negative. Cxr with increased bilateral infiltrates. Pending COVID results.     Upon admission, pt was intubated, withdrawing to pain, not following commands, bucking the vent. Propofol started. Aide not at bedside for further collateral.

## 2020-03-28 NOTE — ED PROVIDER NOTE - CRITICAL CARE PROVIDED
documentation/direct patient care (not related to procedure)/consultation with other physicians/consult w/ pt's family directly relating to pts condition/interpretation of diagnostic studies/telephone consultation with the patient's family/additional history taking

## 2020-03-28 NOTE — H&P ADULT - NSHPPHYSICALEXAM_GEN_ALL_CORE
CONSTITUTIONAL: well developed, intubated  SKIN: warm, dry, no rash, cap refill < 2 seconds  HEENT: normocephalic, atraumatic, moist mucous membranes  NECK: supple, no masses  CV:  tachycardic rate, regular rhythm  RESP: bilateral breath sounds with ventilation, decreased breath sounds bilaterally  ABD: soft, nontender, nondistended  MSK: no cyanosis, no edema  NEURO: intubated, withdraws extremities to pain, does not follow commands

## 2020-03-28 NOTE — CONSULT NOTE ADULT - SUBJECTIVE AND OBJECTIVE BOX
Patient is a 39y old  Male who presents with a chief complaint of Acute hypoxemic respiratory failure (28 Mar 2020 17:00)      INTERVAL HPI/OVERNIGHT EVENTS:        PAST MEDICAL & SURGICAL HISTORY:  Mentally challenged  High blood cholesterol  No significant past surgical history      REVIEW OF SYSTEMS: Total of twelve systems have been reviewed with patient and found to be negative unless mentioned in HPI      SOCIAL HISTORY  Alcohol: Does not drink  Tobacco: Does not smoke  Illicit substance use: None      FAMILY HISTORY: Non contributory to the present illness        No Known Allergies      ICU Vital Signs Last 24 Hrs  T(C): 37.1 (28 Mar 2020 20:20), Max: 38.3 (28 Mar 2020 15:15)  T(F): 98.8 (28 Mar 2020 20:20), Max: 101 (28 Mar 2020 15:15)  HR: 64 (28 Mar 2020 20:20) (64 - 109)  BP: 107/68 (28 Mar 2020 20:20) (82/46 - 135/86)  BP(mean): --  ABP: --  ABP(mean): --  RR: 18 (28 Mar 2020 20:20) (18 - 36)  SpO2: 100% (28 Mar 2020 20:20) (76% - 100%)        PHYSICAL EXAM:  GENERAL: Not in distress   CHEST/LUNG:  Aire ntry bilaterally  HEART: s1 and s2 present  ABDOMEN:  Nontender and  Nondistended  EXTREMITIES: No pedal  edema  CNS: Awake and Alert      LABS:                        15.7   2.36  )-----------( 138      ( 28 Mar 2020 15:00 )             48.2     03-28    139  |  99  |  20  ----------------------------<  115<H>  4.5   |  26  |  1.2    Ca    8.6      28 Mar 2020 15:00  Mg     2.2     03-28    TPro  6.9  /  Alb  3.6  /  TBili  0.5  /  DBili  x   /  AST  42<H>  /  ALT  25  /  AlkPhos  68  03-28    PT/INR - ( 28 Mar 2020 15:00 )   PT: 14.80 sec;   INR: 1.29 ratio         PTT - ( 28 Mar 2020 15:00 )  PTT:30.6 sec  Urinalysis Basic - ( 28 Mar 2020 16:00 )    Color: Yellow / Appearance: Slightly Cloudy / SG: >=1.030 / pH: x  Gluc: x / Ketone: 15  / Bili: Small / Urobili: 0.2 mg/dL   Blood: x / Protein: >=300 mg/dL / Nitrite: Negative   Leuk Esterase: Negative / RBC: >50 /HPF / WBC 3-5 /HPF   Sq Epi: x / Non Sq Epi: Occasional /HPF / Bacteria: Moderate      CAPILLARY BLOOD GLUCOSE        ABG - ( 28 Mar 2020 16:16 )  pH, Arterial: 7.33  pH, Blood: x     /  pCO2: 47    /  pO2: 305   / HCO3: 25    / Base Excess: -1.4  /  SaO2: 100                 Urinalysis Basic - ( 28 Mar 2020 16:00 )    Color: Yellow / Appearance: Slightly Cloudy / SG: >=1.030 / pH: x  Gluc: x / Ketone: 15  / Bili: Small / Urobili: 0.2 mg/dL   Blood: x / Protein: >=300 mg/dL / Nitrite: Negative   Leuk Esterase: Negative / RBC: >50 /HPF / WBC 3-5 /HPF   Sq Epi: x / Non Sq Epi: Occasional /HPF / Bacteria: Moderate        MEDICATIONS  (STANDING):  azithromycin  IVPB 500 milliGRAM(s) IV Intermittent every 24 hours  cefepime   IVPB 2000 milliGRAM(s) IV Intermittent every 8 hours  dexMEDEtomidine Infusion 0.3 MICROgram(s)/kG/Hr (5.96 mL/Hr) IV Continuous <Continuous>  enoxaparin Injectable 40 milliGRAM(s) SubCutaneous daily  fentaNYL   Infusion. 0.5 MICROgram(s)/kG/Hr (4.5 mL/Hr) IV Continuous <Continuous>  hydroxychloroquine 400 milliGRAM(s) Oral every 12 hours  hydroxychloroquine   Oral   norepinephrine Infusion 0.05 MICROgram(s)/kG/Min (4.22 mL/Hr) IV Continuous <Continuous>  pantoprazole   Suspension 40 milliGRAM(s) Oral daily  propofol Infusion 10 MICROgram(s)/kG/Min (5.4 mL/Hr) IV Continuous <Continuous>  propofol Infusion 0.5 MICROgram(s)/kG/Min (0.27 mL/Hr) IV Continuous <Continuous>    MEDICATIONS  (PRN):  acetaminophen  Suppository .. 650 milliGRAM(s) Rectal every 4 hours PRN Mild Pain (1 - 3)          RADIOLOGY & ADDITIONAL TESTS: Patient is a 39y old  Male with PMHx of developmental delay (nonverbal at baseline), HLD who presents from [facility] for sob. Per chart review, pt was seen in the ED on 3/23 for fever and decreased po intake. Labs at that time were notable for WBC 2, AST/ALT 46/71, negative blood cx x2, cxr with bilateral opacities. Pt tolerated po and was d/c'ed back to [facility]. Pt returned on 3/24 with fever, cough, and vomiting episodes after coughing. UA/urine cx negative, CT abd negative for acute abd pathology but showed RLL ground glass opacity. Pt was d/c'ed back to [facility] and instructed to quarantine for possible COVID. Now returned to ER today for worsening sob over past several days. In the ER, He is febrile, tachycardic and tachypneic 30s-40s, satting 76% on RA. SpO2 improved to 91% on NRB but pt still tachypneic and not tolerating NRB well, so was intubated and started on precedex. Labs notable for WBC 2, AST 42, , trop <0.01. UA negative. Cxr with increased bilateral infiltrates. Pending COVID results.         REVIEW OF SYSTEMS: Unable to obtain due to mental status unless mentioned in HPI        PAST MEDICAL & SURGICAL HISTORY:  Mentally challenged  High blood cholesterol  No significant past surgical history        SOCIAL HISTORY  Alcohol: Does not drink  Tobacco: Does not smoke  Illicit substance use: None      FAMILY HISTORY: Non contributory to the present illness        ALLERGIES:  No Known Allergies      ICU Vital Signs Last 24 Hrs  T(C): 37.1 (28 Mar 2020 20:20), Max: 38.3 (28 Mar 2020 15:15)  T(F): 98.8 (28 Mar 2020 20:20), Max: 101 (28 Mar 2020 15:15)  HR: 64 (28 Mar 2020 20:20) (64 - 109)  BP: 107/68 (28 Mar 2020 20:20) (82/46 - 135/86)  BP(mean): --  ABP: --  ABP(mean): --  RR: 18 (28 Mar 2020 20:20) (18 - 36)  SpO2: 100% (28 Mar 2020 20:20) (76% - 100%)        PHYSICAL EXAM:  GENERAL: Intubated/vented  CHEST/LUNG:  Air entry bilaterally  HEART: s1 and s2 present  ABDOMEN:  Nontender and  Nondistended  EXTREMITIES: No pedal  edema  CNS: Intubated/sedated        LABS:                        15.7   2.36  )-----------( 138      ( 28 Mar 2020 15:00 )             48.2       03-28    139  |  99  |  20  ----------------------------<  115<H>  4.5   |  26  |  1.2    Ca    8.6      28 Mar 2020 15:00  Mg     2.2     03-28    TPro  6.9  /  Alb  3.6  /  TBili  0.5  /  DBili  x   /  AST  42<H>  /  ALT  25  /  AlkPhos  68  03-28    PT/INR - ( 28 Mar 2020 15:00 )   PT: 14.80 sec;   INR: 1.29 ratio      PTT - ( 28 Mar 2020 15:00 )  PTT:30.6 sec      Urinalysis Basic - ( 28 Mar 2020 16:00 )  Color: Yellow / Appearance: Slightly Cloudy / SG: >=1.030 / pH: x  Gluc: x / Ketone: 15  / Bili: Small / Urobili: 0.2 mg/dL   Blood: x / Protein: >=300 mg/dL / Nitrite: Negative   Leuk Esterase: Negative / RBC: >50 /HPF / WBC 3-5 /HPF   Sq Epi: x / Non Sq Epi: Occasional /HPF / Bacteria: Moderate        ABG - ( 28 Mar 2020 16:16 )  pH, Arterial: 7.33  pH, Blood: x     /  pCO2: 47    /  pO2: 305   / HCO3: 25    / Base Excess: -1.4  /  SaO2: 100           MEDICATIONS  (STANDING):  azithromycin  IVPB 500 milliGRAM(s) IV Intermittent every 24 hours  cefepime   IVPB 2000 milliGRAM(s) IV Intermittent every 8 hours  dexMEDEtomidine Infusion 0.3 MICROgram(s)/kG/Hr (5.96 mL/Hr) IV Continuous <Continuous>  enoxaparin Injectable 40 milliGRAM(s) SubCutaneous daily  fentaNYL   Infusion. 0.5 MICROgram(s)/kG/Hr (4.5 mL/Hr) IV Continuous <Continuous>  hydroxychloroquine 400 milliGRAM(s) Oral every 12 hours  hydroxychloroquine   Oral   norepinephrine Infusion 0.05 MICROgram(s)/kG/Min (4.22 mL/Hr) IV Continuous <Continuous>  pantoprazole   Suspension 40 milliGRAM(s) Oral daily  propofol Infusion 10 MICROgram(s)/kG/Min (5.4 mL/Hr) IV Continuous <Continuous>  propofol Infusion 0.5 MICROgram(s)/kG/Min (0.27 mL/Hr) IV Continuous <Continuous>    MEDICATIONS  (PRN):  acetaminophen  Suppository .. 650 milliGRAM(s) Rectal every 4 hours PRN Mild Pain (1 - 3)        RADIOLOGY & ADDITIONAL TESTS:      < from: Xray Chest 1 View-PORTABLE IMMEDIATE (03.28.20 @ 22:28) >    Support tubes as above. Repositioning may be of benefit.    Unchanged bilateral opacities.    Clinical staff was notified on 3/29/2020 at 12:30 AM        MICROBIOLOGY DATA:    Urine Microscopic-Add On (NC) (03.28.20 @ 16:00)    Red Blood Cell - Urine: >50 /HPF    White Blood Cell - Urine: 3-5 /HPF    Hyaline Casts: 1-2 /LPF    Bacteria: Moderate    Epithelial Cells: Occasional /HPF

## 2020-03-28 NOTE — ED PROVIDER NOTE - PROGRESS NOTE DETAILS
Pt seen and evaled by Dr. Awan of ICU and accepted Pt self extubated himself - Patient re-intubated and central line placed

## 2020-03-28 NOTE — H&P ADULT - NSHPLABSRESULTS_GEN_ALL_CORE
CBC Full  -  ( 28 Mar 2020 15:00 )  WBC Count : 2.36 K/uL  RBC Count : 5.43 M/uL  Hemoglobin : 15.7 g/dL  Hematocrit : 48.2 %  Platelet Count - Automated : 138 K/uL  Mean Cell Volume : 88.8 fL  Mean Cell Hemoglobin : 28.9 pg  Mean Cell Hemoglobin Concentration : 32.6 g/dL  Auto Neutrophil # : 1.45 K/uL  Auto Lymphocyte # : 0.71 K/uL  Auto Monocyte # : 0.18 K/uL  Auto Eosinophil # : 0.00 K/uL  Auto Basophil # : 0.01 K/uL  Auto Neutrophil % : 61.5 %  Auto Lymphocyte % : 30.1 %  Auto Monocyte % : 7.6 %  Auto Eosinophil % : 0.0 %  Auto Basophil % : 0.4 %    139  |  99  |  20  ----------------------------<  115<H>  4.5   |  26  |  1.2    Ca    8.6        Mg     2.2       TPro  6.9  /  Alb  3.6  /  TBili  0.5  /  DBili  x   /  AST  42<H>  /  ALT  25  /  AlkPhos  68      EXAM:  XR CHEST PORTABLE IMMED 1V          PROCEDURE DATE:  03/28/2020      INTERPRETATION:  Clinical History / Reason for exam: Cough. Fever.    Comparison : Chest radiograph March 23, 2020.    Technique/Positioning: Single frontal chest x-ray obtained.    Findings:  Support devices: None.    Cardiac/mediastinum/hilum: Unchanged.    Lung parenchyma/Pleura: Increased bilateral airspace opacities.    Skeleton/soft tissues: Unchanged.    Impression:    Increased bilateral airspace opacities, which may be infectious in etiology, including viral pneumonia.

## 2020-03-29 LAB
ALBUMIN SERPL ELPH-MCNC: 3.4 G/DL — LOW (ref 3.5–5.2)
ALP SERPL-CCNC: 61 U/L — SIGNIFICANT CHANGE UP (ref 30–115)
ALT FLD-CCNC: 21 U/L — SIGNIFICANT CHANGE UP (ref 0–41)
ANION GAP SERPL CALC-SCNC: 12 MMOL/L — SIGNIFICANT CHANGE UP (ref 7–14)
AST SERPL-CCNC: 35 U/L — SIGNIFICANT CHANGE UP (ref 0–41)
BASE EXCESS BLDA CALC-SCNC: 1 MMOL/L — SIGNIFICANT CHANGE UP (ref -2–2)
BASOPHILS # BLD AUTO: 0.01 K/UL — SIGNIFICANT CHANGE UP (ref 0–0.2)
BASOPHILS NFR BLD AUTO: 0.3 % — SIGNIFICANT CHANGE UP (ref 0–1)
BILIRUB SERPL-MCNC: 0.4 MG/DL — SIGNIFICANT CHANGE UP (ref 0.2–1.2)
BUN SERPL-MCNC: 21 MG/DL — HIGH (ref 10–20)
CALCIUM SERPL-MCNC: 7.9 MG/DL — LOW (ref 8.5–10.1)
CHLORIDE SERPL-SCNC: 105 MMOL/L — SIGNIFICANT CHANGE UP (ref 98–110)
CO2 SERPL-SCNC: 25 MMOL/L — SIGNIFICANT CHANGE UP (ref 17–32)
CREAT SERPL-MCNC: 1.2 MG/DL — SIGNIFICANT CHANGE UP (ref 0.7–1.5)
CRP SERPL-MCNC: 5.69 MG/DL — HIGH (ref 0–0.4)
CRP SERPL-MCNC: 5.71 MG/DL — HIGH (ref 0–0.4)
CULTURE RESULTS: NO GROWTH — SIGNIFICANT CHANGE UP
CULTURE RESULTS: SIGNIFICANT CHANGE UP
CULTURE RESULTS: SIGNIFICANT CHANGE UP
EOSINOPHIL # BLD AUTO: 0 K/UL — SIGNIFICANT CHANGE UP (ref 0–0.7)
EOSINOPHIL NFR BLD AUTO: 0 % — SIGNIFICANT CHANGE UP (ref 0–8)
ERYTHROCYTE [SEDIMENTATION RATE] IN BLOOD: 49 MM/HR — HIGH (ref 0–10)
FERRITIN SERPL-MCNC: 2414 NG/ML — HIGH (ref 30–400)
GAS PNL BLDA: SIGNIFICANT CHANGE UP
GLUCOSE SERPL-MCNC: 162 MG/DL — HIGH (ref 70–99)
HCO3 BLDA-SCNC: 26 MMOL/L — SIGNIFICANT CHANGE UP (ref 23–27)
HCT VFR BLD CALC: 47.1 % — SIGNIFICANT CHANGE UP (ref 42–52)
HGB BLD-MCNC: 15.2 G/DL — SIGNIFICANT CHANGE UP (ref 14–18)
HOROWITZ INDEX BLDA+IHG-RTO: 21 — SIGNIFICANT CHANGE UP
IMM GRANULOCYTES NFR BLD AUTO: 1.6 % — HIGH (ref 0.1–0.3)
LYMPHOCYTES # BLD AUTO: 0.99 K/UL — LOW (ref 1.2–3.4)
LYMPHOCYTES # BLD AUTO: 25.8 % — SIGNIFICANT CHANGE UP (ref 20.5–51.1)
MCHC RBC-ENTMCNC: 28.8 PG — SIGNIFICANT CHANGE UP (ref 27–31)
MCHC RBC-ENTMCNC: 32.3 G/DL — SIGNIFICANT CHANGE UP (ref 32–37)
MCV RBC AUTO: 89.2 FL — SIGNIFICANT CHANGE UP (ref 80–94)
MONOCYTES # BLD AUTO: 0.29 K/UL — SIGNIFICANT CHANGE UP (ref 0.1–0.6)
MONOCYTES NFR BLD AUTO: 7.6 % — SIGNIFICANT CHANGE UP (ref 1.7–9.3)
NEUTROPHILS # BLD AUTO: 2.48 K/UL — SIGNIFICANT CHANGE UP (ref 1.4–6.5)
NEUTROPHILS NFR BLD AUTO: 64.7 % — SIGNIFICANT CHANGE UP (ref 42.2–75.2)
NRBC # BLD: 0 /100 WBCS — SIGNIFICANT CHANGE UP (ref 0–0)
PCO2 BLDA: 40 MMHG — SIGNIFICANT CHANGE UP (ref 38–42)
PH BLDA: 7.41 — SIGNIFICANT CHANGE UP (ref 7.38–7.42)
PLATELET # BLD AUTO: 162 K/UL — SIGNIFICANT CHANGE UP (ref 130–400)
PO2 BLDA: 115 MMHG — HIGH (ref 78–95)
POTASSIUM SERPL-MCNC: 4.7 MMOL/L — SIGNIFICANT CHANGE UP (ref 3.5–5)
POTASSIUM SERPL-SCNC: 4.7 MMOL/L — SIGNIFICANT CHANGE UP (ref 3.5–5)
PROCALCITONIN SERPL-MCNC: 0.09 NG/ML — SIGNIFICANT CHANGE UP (ref 0.02–0.1)
PROT SERPL-MCNC: 6.6 G/DL — SIGNIFICANT CHANGE UP (ref 6–8)
RBC # BLD: 5.28 M/UL — SIGNIFICANT CHANGE UP (ref 4.7–6.1)
RBC # FLD: 16.6 % — HIGH (ref 11.5–14.5)
SAO2 % BLDA: 99 % — HIGH (ref 94–98)
SARS-COV-2 RNA SPEC QL NAA+PROBE: DETECTED
SODIUM SERPL-SCNC: 142 MMOL/L — SIGNIFICANT CHANGE UP (ref 135–146)
SPECIMEN SOURCE: SIGNIFICANT CHANGE UP
WBC # BLD: 3.83 K/UL — LOW (ref 4.8–10.8)
WBC # FLD AUTO: 3.83 K/UL — LOW (ref 4.8–10.8)

## 2020-03-29 PROCEDURE — 71045 X-RAY EXAM CHEST 1 VIEW: CPT | Mod: 26

## 2020-03-29 RX ORDER — CHLORHEXIDINE GLUCONATE 213 G/1000ML
15 SOLUTION TOPICAL
Refills: 0 | Status: DISCONTINUED | OUTPATIENT
Start: 2020-03-29 | End: 2020-04-08

## 2020-03-29 RX ORDER — CHLORHEXIDINE GLUCONATE 213 G/1000ML
1 SOLUTION TOPICAL
Refills: 0 | Status: DISCONTINUED | OUTPATIENT
Start: 2020-03-29 | End: 2020-04-08

## 2020-03-29 RX ADMIN — MIDAZOLAM HYDROCHLORIDE 1.96 MG/KG/HR: 1 INJECTION, SOLUTION INTRAMUSCULAR; INTRAVENOUS at 23:48

## 2020-03-29 RX ADMIN — DEXMEDETOMIDINE HYDROCHLORIDE IN 0.9% SODIUM CHLORIDE 5.96 MICROGRAM(S)/KG/HR: 4 INJECTION INTRAVENOUS at 23:06

## 2020-03-29 RX ADMIN — Medication 650 MILLIGRAM(S): at 23:25

## 2020-03-29 RX ADMIN — MIDAZOLAM HYDROCHLORIDE 1.96 MG/KG/HR: 1 INJECTION, SOLUTION INTRAMUSCULAR; INTRAVENOUS at 11:55

## 2020-03-29 RX ADMIN — CEFEPIME 100 MILLIGRAM(S): 1 INJECTION, POWDER, FOR SOLUTION INTRAMUSCULAR; INTRAVENOUS at 23:06

## 2020-03-29 RX ADMIN — ENOXAPARIN SODIUM 40 MILLIGRAM(S): 100 INJECTION SUBCUTANEOUS at 12:29

## 2020-03-29 RX ADMIN — PANTOPRAZOLE SODIUM 40 MILLIGRAM(S): 20 TABLET, DELAYED RELEASE ORAL at 12:29

## 2020-03-29 RX ADMIN — Medication 400 MILLIGRAM(S): at 05:10

## 2020-03-29 RX ADMIN — Medication 4.22 MICROGRAM(S)/KG/MIN: at 23:47

## 2020-03-29 RX ADMIN — CEFEPIME 100 MILLIGRAM(S): 1 INJECTION, POWDER, FOR SOLUTION INTRAMUSCULAR; INTRAVENOUS at 05:10

## 2020-03-29 RX ADMIN — DEXMEDETOMIDINE HYDROCHLORIDE IN 0.9% SODIUM CHLORIDE 5.96 MICROGRAM(S)/KG/HR: 4 INJECTION INTRAVENOUS at 11:55

## 2020-03-29 RX ADMIN — AZITHROMYCIN 255 MILLIGRAM(S): 500 TABLET, FILM COATED ORAL at 23:47

## 2020-03-29 RX ADMIN — CEFEPIME 100 MILLIGRAM(S): 1 INJECTION, POWDER, FOR SOLUTION INTRAMUSCULAR; INTRAVENOUS at 14:49

## 2020-03-29 RX ADMIN — Medication 4.22 MICROGRAM(S)/KG/MIN: at 11:55

## 2020-03-29 RX ADMIN — Medication 200 MILLIGRAM(S): at 18:26

## 2020-03-29 NOTE — CONSULT NOTE ADULT - SUBJECTIVE AND OBJECTIVE BOX
Patient is a 39y old  Male who presents with a chief complaint of Acute hypoxemic respiratory failure (28 Mar 2020 21:54)      HPI:  40 yo M with PMHx of developmental delay (nonverbal at baseline), HLD who presents from [facility] for sob. Per chart review, pt was seen in the ED on 3/23 for fever and decreased po intake. Labs at that time were notable for WBC 2, AST/ALT 46/71, negative blood cx x2, cxr with bilateral opacities. Pt tolerated po and was d/c'ed back to . Pt returned on 3/24 with fever, cough, and vomiting episodes after coughing. UA/urine cx negative, CT abd negative for acute abd pathology but showed RLL ground glass opacity. Pt was d/c'ed back to  and instructed to quarantine for possible COVID. Pt then returned to ED today with worsening sob over past several days.    In ED, HR 100s, rectal temp 101, normotensive, tachypneic 30s-40s, satting 76% on RA. SpO2 improved to 91% on NRB but pt still tachypneic and not tolerating NRB well so was intubated and started on precedex. Labs notable for WBC 2, AST 42, , trop <0.01. UA negative. Cxr with increased bilateral infiltrates. Pending COVID results.     Upon admission, pt was intubated, withdrawing to pain, not following commands, bucking the vent. Propofol started. Aide not at bedside for further collateral. (28 Mar 2020 17:00)      PAST MEDICAL & SURGICAL HISTORY:  Mentally challenged  High blood cholesterol  No significant past surgical history    Allergies    No Known Allergies    Intolerances      Family history : no cardiovscular family history   Home Medications:  ammonium lactate topical:  (23 Mar 2020 11:37)  atorvastatin 10 mg oral tablet:  (23 Mar 2020 11:37)  clotrimazole topical:  (23 Mar 2020 11:37)  Vitamin D2:  (23 Mar 2020 11:37)    Occupation:  Alochol: Denied  Smoking: Denied  Drug Use: Denied  Marital Status:         ROS: as in HPI; All other systems reviewed are negative    ICU Vital Signs Last 24 Hrs  T(C): 37.1 (29 Mar 2020 07:01), Max: 38.3 (28 Mar 2020 15:15)  T(F): 98.8 (29 Mar 2020 07:01), Max: 101 (28 Mar 2020 15:15)  HR: 74 (29 Mar 2020 08:08) (63 - 109)  BP: 94/66 (29 Mar 2020 08:08) (82/46 - 135/86)  BP(mean): 76 (29 Mar 2020 08:08) (61 - 96)  ABP: --  ABP(mean): --  RR: 18 (29 Mar 2020 08:08) (18 - 36)  SpO2: 100% (29 Mar 2020 08:08) (76% - 100%)        Physical Examination:    General: on sedation     HEENT: Pupils equal, reactive to light.  Symmetric.    PULM: decrease bibasilar     CVS: Regular rate and rhythm, no murmurs, rubs, or gallops    ABD: Soft, nondistended, nontender, normoactive bowel sounds, no masses    EXT: No edema, nontender, no clubbing     SKIN: Warm and well perfused, no rashes noted.    Neurology : no motor or sensory deficit     Musculoskeletal : move all extremity     Lymphatic system: no Palpable node       Mode: Auto Mode: PRVC/ Volume Support  RR (machine): 18  TV (machine): 450  FiO2: 100  PEEP: 16  MAP: 19  PIP: 25      ABG - ( 29 Mar 2020 04:58 )  pH, Arterial: 7.39  pH, Blood: x     /  pCO2: 40    /  pO2: 351   / HCO3: 24    / Base Excess: -0.5  /  SaO2: 100                 I&O's Detail    28 Mar 2020 07:01  -  29 Mar 2020 07:00  --------------------------------------------------------  IN:    Enteral Tube Flush: 30 mL    fentaNYL Infusion.: 185 mL    IV PiggyBack: 350 mL    midazolam Infusion: 45 mL    norepinephrine Infusion: 81 mL  Total IN: 691 mL    OUT:    Indwelling Catheter - Urethral: 825 mL  Total OUT: 825 mL    Total NET: -134 mL      29 Mar 2020 07:01  -  29 Mar 2020 09:12  --------------------------------------------------------  IN:    fentaNYL Infusion.: 10 mL    midazolam Infusion: 5 mL    norepinephrine Infusion: 13 mL  Total IN: 28 mL    OUT:    Indwelling Catheter - Urethral: 110 mL  Total OUT: 110 mL    Total NET: -82 mL            LABS:                        15.2   3.83  )-----------( 162      ( 29 Mar 2020 07:22 )             47.1     29 Mar 2020 07:22    142    |  105    |  21     ----------------------------<  162    4.7     |  25     |  1.2      Ca    7.9        29 Mar 2020 07:22  Mg     2.2       28 Mar 2020 15:00    TPro  6.6    /  Alb  3.4    /  TBili  0.4    /  DBili  x      /  AST  35     /  ALT  21     /  AlkPhos  61     29 Mar 2020 07:22  Amylase x     lipase x          CARDIAC MARKERS ( 28 Mar 2020 15:00 )  x     / <0.01 ng/mL / x     / x     / x          CAPILLARY BLOOD GLUCOSE        PT/INR - ( 28 Mar 2020 15:00 )   PT: 14.80 sec;   INR: 1.29 ratio         PTT - ( 28 Mar 2020 15:00 )  PTT:30.6 sec  Urinalysis Basic - ( 28 Mar 2020 16:00 )    Color: Yellow / Appearance: Slightly Cloudy / SG: >=1.030 / pH: x  Gluc: x / Ketone: 15  / Bili: Small / Urobili: 0.2 mg/dL   Blood: x / Protein: >=300 mg/dL / Nitrite: Negative   Leuk Esterase: Negative / RBC: >50 /HPF / WBC 3-5 /HPF   Sq Epi: x / Non Sq Epi: Occasional /HPF / Bacteria: Moderate      Culture    Lactate, Blood: 1.4 mmol/L (03-28-20 @ 15:00)      MEDICATIONS  (STANDING):  azithromycin  IVPB 500 milliGRAM(s) IV Intermittent every 24 hours  cefepime   IVPB 2000 milliGRAM(s) IV Intermittent every 8 hours  dexMEDEtomidine Infusion 0.3 MICROgram(s)/kG/Hr (5.96 mL/Hr) IV Continuous <Continuous>  enoxaparin Injectable 40 milliGRAM(s) SubCutaneous daily  fentaNYL   Infusion. 0.5 MICROgram(s)/kG/Hr (4.5 mL/Hr) IV Continuous <Continuous>  hydroxychloroquine 200 milliGRAM(s) Oral every 12 hours  hydroxychloroquine   Oral   midazolam Infusion 0.02 mG/kG/Hr (1.96 mL/Hr) IV Continuous <Continuous>  norepinephrine Infusion 0.05 MICROgram(s)/kG/Min (4.22 mL/Hr) IV Continuous <Continuous>  pantoprazole   Suspension 40 milliGRAM(s) Oral daily  propofol Infusion 10 MICROgram(s)/kG/Min (5.4 mL/Hr) IV Continuous <Continuous>  propofol Infusion 0.5 MICROgram(s)/kG/Min (0.27 mL/Hr) IV Continuous <Continuous>    MEDICATIONS  (PRN):  acetaminophen  Suppository .. 650 milliGRAM(s) Rectal every 4 hours PRN Mild Pain (1 - 3)        RADIOLOGY: ***     CXR: mild b/l opacity   TLC:  OG:  ET tube:        CAM ICU:  ECHO:

## 2020-03-29 NOTE — CONSULT NOTE ADULT - ASSESSMENT
IMPRESSION:  ARf hpoxic   secondary to viral PNA   COVID  19      PLAN:    CNS: sedation continue today     HEENT:oral care     PULMONARY: lower fio2 o 50% and then will lower PEEP     CARDIOVASCULAR: keep IS =OS     GI: GI prophylaxis. NG  Feeding     RENAL: follow lytes IS and OS     INFECTIOUS DISEASE: HCQ continue ABX   ID cosnult     HEMATOLOGICAL:  DVT prophylaxis.    ENDOCRINE:  Follow up FS.  Insulin protocol if needed.    MUSCULOSKELETAL:        CRITICAL CARE TIME SPENT: ***
Patient is a 39y old  Male with PMHx of developmental delay (nonverbal at baseline), HLD who presents from [facility] for sob. Per chart review, pt was seen in the ED on 3/23 for fever and decreased po intake. Labs at that time were notable for WBC 2, AST/ALT 46/71, negative blood cx x2, cxr with bilateral opacities. Pt tolerated po and was d/c'ed back to [facility]. Pt returned on 3/24 with fever, cough, and vomiting episodes after coughing. UA/urine cx negative, CT abd negative for acute abd pathology but showed RLL ground glass opacity. Pt was d/c'ed back to [facility] and instructed to quarantine for possible COVID. Now returned to ER today for worsening sob over past several days. In the ER, He is febrile, tachycardic and tachypneic 30s-40s, satting 76% on RA. SpO2 improved to 91% on NRB but pt still tachypneic and not tolerating NRB well, so was intubated and started on precedex. Labs notable for WBC 2, AST 42, , trop <0.01. UA negative. Cxr with increased bilateral infiltrates. Pending COVID results.       # Suspected  COVID 19 Pneumonia  # Acute Hypoxic Respiratory failure    would recommend:    1. Follow up cultures and COVID 19 PCR, OBtain G-6PD level  2. Continue HCQ  and Monitor QTc if >500 please discontinue Azithromycin  3. Oral Care and Aspiration precaution  4. Management of Vent as per ICU protocol  5. Monitor  Temp. and c/w supportive care  6. Monitor Kidney function closely and adjust abx doses accordingly     will follow the patient with you and make further recommendation based on the clinical course and Lab results  Thank you for the opportunity to participate in Mr. MARIE's care      Attending Attestation:    Spent critical time 65 minutes on total encounter, more than 50 % of the visit was spent counseling and/or coordinating care by the Attending physician.

## 2020-03-30 ENCOUNTER — RX RENEWAL (OUTPATIENT)
Age: 40
End: 2020-03-30

## 2020-03-30 LAB
ANION GAP SERPL CALC-SCNC: 13 MMOL/L — SIGNIFICANT CHANGE UP (ref 7–14)
BASOPHILS # BLD AUTO: 0.01 K/UL — SIGNIFICANT CHANGE UP (ref 0–0.2)
BASOPHILS NFR BLD AUTO: 0.2 % — SIGNIFICANT CHANGE UP (ref 0–1)
BUN SERPL-MCNC: 13 MG/DL — SIGNIFICANT CHANGE UP (ref 10–20)
CALCIUM SERPL-MCNC: 8.1 MG/DL — LOW (ref 8.5–10.1)
CHLORIDE SERPL-SCNC: 104 MMOL/L — SIGNIFICANT CHANGE UP (ref 98–110)
CO2 SERPL-SCNC: 25 MMOL/L — SIGNIFICANT CHANGE UP (ref 17–32)
CREAT SERPL-MCNC: 1 MG/DL — SIGNIFICANT CHANGE UP (ref 0.7–1.5)
EOSINOPHIL # BLD AUTO: 0.01 K/UL — SIGNIFICANT CHANGE UP (ref 0–0.7)
EOSINOPHIL NFR BLD AUTO: 0.2 % — SIGNIFICANT CHANGE UP (ref 0–8)
GAS PNL BLDA: SIGNIFICANT CHANGE UP
GLUCOSE SERPL-MCNC: 138 MG/DL — HIGH (ref 70–99)
HCT VFR BLD CALC: 49.1 % — SIGNIFICANT CHANGE UP (ref 42–52)
HGB BLD-MCNC: 15.7 G/DL — SIGNIFICANT CHANGE UP (ref 14–18)
IMM GRANULOCYTES NFR BLD AUTO: 0.9 % — HIGH (ref 0.1–0.3)
LYMPHOCYTES # BLD AUTO: 0.63 K/UL — LOW (ref 1.2–3.4)
LYMPHOCYTES # BLD AUTO: 13.7 % — LOW (ref 20.5–51.1)
MAGNESIUM SERPL-MCNC: 2.2 MG/DL — SIGNIFICANT CHANGE UP (ref 1.8–2.4)
MCHC RBC-ENTMCNC: 29 PG — SIGNIFICANT CHANGE UP (ref 27–31)
MCHC RBC-ENTMCNC: 32 G/DL — SIGNIFICANT CHANGE UP (ref 32–37)
MCV RBC AUTO: 90.8 FL — SIGNIFICANT CHANGE UP (ref 80–94)
MONOCYTES # BLD AUTO: 0.17 K/UL — SIGNIFICANT CHANGE UP (ref 0.1–0.6)
MONOCYTES NFR BLD AUTO: 3.7 % — SIGNIFICANT CHANGE UP (ref 1.7–9.3)
NEUTROPHILS # BLD AUTO: 3.74 K/UL — SIGNIFICANT CHANGE UP (ref 1.4–6.5)
NEUTROPHILS NFR BLD AUTO: 81.3 % — HIGH (ref 42.2–75.2)
NRBC # BLD: 0 /100 WBCS — SIGNIFICANT CHANGE UP (ref 0–0)
PHOSPHATE SERPL-MCNC: 2.3 MG/DL — SIGNIFICANT CHANGE UP (ref 2.1–4.9)
PLATELET # BLD AUTO: 161 K/UL — SIGNIFICANT CHANGE UP (ref 130–400)
POTASSIUM SERPL-MCNC: 5.1 MMOL/L — HIGH (ref 3.5–5)
POTASSIUM SERPL-SCNC: 5.1 MMOL/L — HIGH (ref 3.5–5)
RBC # BLD: 5.41 M/UL — SIGNIFICANT CHANGE UP (ref 4.7–6.1)
RBC # FLD: 16.7 % — HIGH (ref 11.5–14.5)
SODIUM SERPL-SCNC: 142 MMOL/L — SIGNIFICANT CHANGE UP (ref 135–146)
WBC # BLD: 4.6 K/UL — LOW (ref 4.8–10.8)
WBC # FLD AUTO: 4.6 K/UL — LOW (ref 4.8–10.8)

## 2020-03-30 PROCEDURE — 99291 CRITICAL CARE FIRST HOUR: CPT

## 2020-03-30 PROCEDURE — 71045 X-RAY EXAM CHEST 1 VIEW: CPT | Mod: 26

## 2020-03-30 RX ORDER — ZINC SULFATE TAB 220 MG (50 MG ZINC EQUIVALENT) 220 (50 ZN) MG
220 TAB ORAL DAILY
Refills: 0 | Status: DISCONTINUED | OUTPATIENT
Start: 2020-03-30 | End: 2020-04-08

## 2020-03-30 RX ORDER — AMMONIUM LACTATE 12 %
12 CREAM (GRAM) TOPICAL
Qty: 1 | Refills: 5 | Status: ACTIVE | COMMUNITY
Start: 2019-03-14 | End: 1900-01-01

## 2020-03-30 RX ADMIN — DEXMEDETOMIDINE HYDROCHLORIDE IN 0.9% SODIUM CHLORIDE 5.96 MICROGRAM(S)/KG/HR: 4 INJECTION INTRAVENOUS at 04:29

## 2020-03-30 RX ADMIN — DEXMEDETOMIDINE HYDROCHLORIDE IN 0.9% SODIUM CHLORIDE 5.96 MICROGRAM(S)/KG/HR: 4 INJECTION INTRAVENOUS at 08:47

## 2020-03-30 RX ADMIN — CEFEPIME 100 MILLIGRAM(S): 1 INJECTION, POWDER, FOR SOLUTION INTRAMUSCULAR; INTRAVENOUS at 20:06

## 2020-03-30 RX ADMIN — CHLORHEXIDINE GLUCONATE 15 MILLILITER(S): 213 SOLUTION TOPICAL at 17:23

## 2020-03-30 RX ADMIN — CHLORHEXIDINE GLUCONATE 1 APPLICATION(S): 213 SOLUTION TOPICAL at 05:38

## 2020-03-30 RX ADMIN — PANTOPRAZOLE SODIUM 40 MILLIGRAM(S): 20 TABLET, DELAYED RELEASE ORAL at 11:47

## 2020-03-30 RX ADMIN — Medication 200 MILLIGRAM(S): at 17:22

## 2020-03-30 RX ADMIN — DEXMEDETOMIDINE HYDROCHLORIDE IN 0.9% SODIUM CHLORIDE 5.96 MICROGRAM(S)/KG/HR: 4 INJECTION INTRAVENOUS at 07:03

## 2020-03-30 RX ADMIN — DEXMEDETOMIDINE HYDROCHLORIDE IN 0.9% SODIUM CHLORIDE 5.96 MICROGRAM(S)/KG/HR: 4 INJECTION INTRAVENOUS at 16:15

## 2020-03-30 RX ADMIN — Medication 650 MILLIGRAM(S): at 02:12

## 2020-03-30 RX ADMIN — Medication 650 MILLIGRAM(S): at 20:06

## 2020-03-30 RX ADMIN — Medication 650 MILLIGRAM(S): at 08:44

## 2020-03-30 RX ADMIN — ZINC SULFATE TAB 220 MG (50 MG ZINC EQUIVALENT) 220 MILLIGRAM(S): 220 (50 ZN) TAB at 11:47

## 2020-03-30 RX ADMIN — DEXMEDETOMIDINE HYDROCHLORIDE IN 0.9% SODIUM CHLORIDE 5.96 MICROGRAM(S)/KG/HR: 4 INJECTION INTRAVENOUS at 11:47

## 2020-03-30 RX ADMIN — CHLORHEXIDINE GLUCONATE 15 MILLILITER(S): 213 SOLUTION TOPICAL at 07:02

## 2020-03-30 RX ADMIN — Medication 200 MILLIGRAM(S): at 05:54

## 2020-03-30 RX ADMIN — CHLORHEXIDINE GLUCONATE 1 APPLICATION(S): 213 SOLUTION TOPICAL at 17:22

## 2020-03-30 RX ADMIN — CEFEPIME 100 MILLIGRAM(S): 1 INJECTION, POWDER, FOR SOLUTION INTRAMUSCULAR; INTRAVENOUS at 05:53

## 2020-03-30 RX ADMIN — CEFEPIME 100 MILLIGRAM(S): 1 INJECTION, POWDER, FOR SOLUTION INTRAMUSCULAR; INTRAVENOUS at 13:50

## 2020-03-30 RX ADMIN — ENOXAPARIN SODIUM 40 MILLIGRAM(S): 100 INJECTION SUBCUTANEOUS at 11:47

## 2020-03-30 RX ADMIN — DEXMEDETOMIDINE HYDROCHLORIDE IN 0.9% SODIUM CHLORIDE 5.96 MICROGRAM(S)/KG/HR: 4 INJECTION INTRAVENOUS at 14:21

## 2020-03-30 RX ADMIN — DEXMEDETOMIDINE HYDROCHLORIDE IN 0.9% SODIUM CHLORIDE 5.96 MICROGRAM(S)/KG/HR: 4 INJECTION INTRAVENOUS at 02:13

## 2020-03-30 NOTE — PROGRESS NOTE ADULT - SUBJECTIVE AND OBJECTIVE BOX
Patient is a 39y old  Male who presents with a chief complaint of Acute hypoxemic respiratory failure (29 Mar 2020 09:12)        Over Night Events: Remains on MV, on pressors, sedated        ROS:     CONSTITUTIONAL:   + fever   no chills.  no weight gain   no weight loss    EYES:   no discharge,   no pain  no redness,   no visual changes.    ENT:   Ears: no ear pain and no hearing problems.  Nose: no nasal congestion and no nasal drainage.  Mouth/Throat: no dysphagia,  no hoarseness and no throat pain.  Neck: no lumps, no pain, no stiffness and no swollen glands.     CARDIOVASCULAR:   no chest pain,   no swelling  no palpitaions  no syncope    RESPIRATORY:  no SOB,  no wheezing ,  no respiratory difficulty  no sputum production    GASTROINTESTINAL:   no abdominal pain,   no constipation,   no diarrhea,   no vomiting.    GENITOURINARY:  no dysuria,   no frequency,   no urgency  no hematuria.    MUSCULOSKELETAL:   no back pain,   no musculoskeletal pain,  no weakness.    SKIN:   no jaundice,   no lesions,   no pruritis,   no rashes.    NEURO:   no loss of consciousness,   no gait abnormality,   no headache,   no sensory deficits,   no weakness.    PSYCHIATRIC:   no known mental health issues  no anxiety  no depression    ALLERGIC/IMMUNOLOGIC:   No active allergic or immunologic issues        PHYSICAL EXAM    ICU Vital Signs Last 24 Hrs  T(C): 39.4 (30 Mar 2020 07:01), Max: 39.8 (30 Mar 2020 00:12)  T(F): 102.9 (30 Mar 2020 07:01), Max: 103.6 (30 Mar 2020 00:12)  HR: 78 (30 Mar 2020 06:12) (74 - 91)  BP: 105/75 (30 Mar 2020 06:12) (88/58 - 116/68)  BP(mean): 86 (30 Mar 2020 06:12) (68 - 91)  RR: 22 (30 Mar 2020 07:01) (0 - 24)  SpO2: 93% (30 Mar 2020 06:12) (84% - 100%)      CONSTITUTIONAL:   Ill appearing.  Well nourished.  NAD    ENT:   + ETT  Mouth with normal mucosa.   No thrush    EYES:   Pupils equal,   Round and reactive to light.    CARDIAC:   Normal rate,   Regular rhythm.    No edema      Vascular:  Normal systolic impulse  No Carotid bruits    RESPIRATORY:   No wheezing  Bilateral BS  Normal chest expansion  Not tachypneic,  No use of accessory muscles    GASTROINTESTINAL:  Abdomen soft,   Non-tender,   No guarding,   + BS    GENITOURINARY  normal genitalia for sex  no edema    MUSCULOSKELETAL:   Range of motion is not limited,  No muscle or joint tenderness  No clubbing, cyanosis    NEUROLOGICAL:   Alert and oriented   No motor  deficits.  pertinent DTRs normal    SKIN:   Skin normal color for race,   Warm and dry and intact.   No evidence of rash.    PSYCHIATRIC:   Normal mood and affect.   No apparent risk to self or others.    HEME LYMPH:   No splenomegaly.  No cervical  lymphadenopathy.  no inguinal lymphadenopathy      03-29-20 @ 07:01  -  03-30-20 @ 07:00  --------------------------------------------------------  IN:    dexmedetomidine Infusion: 442.5 mL    fentaNYL Infusion.: 30 mL    IV PiggyBack: 350 mL    midazolam Infusion: 165 mL    norepinephrine Infusion: 312 mL  Total IN: 1299.5 mL    OUT:    Indwelling Catheter - Urethral: 1375 mL  Total OUT: 1375 mL    Total NET: -75.5 mL      03-30-20 @ 07:01  -  03-30-20 @ 09:25  --------------------------------------------------------  IN:  Total IN: 0 mL    OUT:    Indwelling Catheter - Urethral: 50 mL  Total OUT: 50 mL    Total NET: -50 mL          LABS:                            15.7   4.60  )-----------( 161      ( 30 Mar 2020 06:05 )             49.1                                               03-30    142  |  104  |  13  ----------------------------<  138<H>  5.1<H>   |  25  |  1.0    Ca    8.1<L>      30 Mar 2020 06:05  Phos  2.3     03-30  Mg     2.2     03-30    TPro  6.6  /  Alb  3.4<L>  /  TBili  0.4  /  DBili  x   /  AST  35  /  ALT  21  /  AlkPhos  61  03-29      PT/INR - ( 28 Mar 2020 15:00 )   PT: 14.80 sec;   INR: 1.29 ratio         PTT - ( 28 Mar 2020 15:00 )  PTT:30.6 sec                                       Urinalysis Basic - ( 28 Mar 2020 16:00 )    Color: Yellow / Appearance: Slightly Cloudy / SG: >=1.030 / pH: x  Gluc: x / Ketone: 15  / Bili: Small / Urobili: 0.2 mg/dL   Blood: x / Protein: >=300 mg/dL / Nitrite: Negative   Leuk Esterase: Negative / RBC: >50 /HPF / WBC 3-5 /HPF   Sq Epi: x / Non Sq Epi: Occasional /HPF / Bacteria: Moderate        CARDIAC MARKERS ( 28 Mar 2020 15:00 )  x     / <0.01 ng/mL / x     / x     / x                                                LIVER FUNCTIONS - ( 29 Mar 2020 07:22 )  Alb: 3.4 g/dL / Pro: 6.6 g/dL / ALK PHOS: 61 U/L / ALT: 21 U/L / AST: 35 U/L / GGT: x                                                  Culture - Urine (collected 28 Mar 2020 16:00)  Source: .Urine Clean Catch (Midstream)  Final Report (29 Mar 2020 20:18):    No growth    Culture - Blood (collected 28 Mar 2020 15:00)  Source: .Blood Blood-Peripheral  Preliminary Report (29 Mar 2020 23:02):    No growth to date.    Culture - Blood (collected 28 Mar 2020 15:00)  Source: .Blood Blood-Peripheral  Preliminary Report (29 Mar 2020 23:02):    No growth to date.                                                   Mode: AC/ CMV (Assist Control/ Continuous Mandatory Ventilation)  RR (machine): 18  TV (machine): 450  FiO2: 50  PEEP: 16  ITime: 1  MAP: 19  PIP: 25                                      ABG - ( 29 Mar 2020 18:28 )  pH, Arterial: 7.41  pH, Blood: x     /  pCO2: 40    /  pO2: 115   / HCO3: 26    / Base Excess: 1.0   /  SaO2: 99                  MEDICATIONS  (STANDING):  azithromycin  IVPB 500 milliGRAM(s) IV Intermittent every 24 hours  cefepime   IVPB 2000 milliGRAM(s) IV Intermittent every 8 hours  chlorhexidine 0.12% Liquid 15 milliLiter(s) Oral Mucosa two times a day  chlorhexidine 4% Liquid 1 Application(s) Topical two times a day  dexMEDEtomidine Infusion 0.3 MICROgram(s)/kG/Hr (5.96 mL/Hr) IV Continuous <Continuous>  enoxaparin Injectable 40 milliGRAM(s) SubCutaneous daily  fentaNYL   Infusion. 0.5 MICROgram(s)/kG/Hr (4.5 mL/Hr) IV Continuous <Continuous>  hydroxychloroquine 200 milliGRAM(s) Oral every 12 hours  hydroxychloroquine   Oral   midazolam Infusion 0.02 mG/kG/Hr (1.96 mL/Hr) IV Continuous <Continuous>  norepinephrine Infusion 0.05 MICROgram(s)/kG/Min (4.22 mL/Hr) IV Continuous <Continuous>  pantoprazole   Suspension 40 milliGRAM(s) Oral daily  propofol Infusion 10 MICROgram(s)/kG/Min (5.4 mL/Hr) IV Continuous <Continuous>  propofol Infusion 0.5 MICROgram(s)/kG/Min (0.27 mL/Hr) IV Continuous <Continuous>    MEDICATIONS  (PRN):  acetaminophen  Suppository .. 650 milliGRAM(s) Rectal every 4 hours PRN Mild Pain (1 - 3)      New X-rays reviewed:                                                                                  ECHO    CXR interpreted by me: Bilateral opacities  ETT OK, OGT OK Patient is a 39y old  Male who presents with a chief complaint of Acute hypoxemic respiratory failure (29 Mar 2020 09:12)        Over Night Events: Remains on MV, on pressors, sedated        ROS:   Unable to obtain due to patient's condition.        PHYSICAL EXAM    ICU Vital Signs Last 24 Hrs  T(C): 39.4 (30 Mar 2020 07:01), Max: 39.8 (30 Mar 2020 00:12)  T(F): 102.9 (30 Mar 2020 07:01), Max: 103.6 (30 Mar 2020 00:12)  HR: 78 (30 Mar 2020 06:12) (74 - 91)  BP: 105/75 (30 Mar 2020 06:12) (88/58 - 116/68)  BP(mean): 86 (30 Mar 2020 06:12) (68 - 91)  RR: 22 (30 Mar 2020 07:01) (0 - 24)  SpO2: 93% (30 Mar 2020 06:12) (84% - 100%)      CONSTITUTIONAL:   Ill appearing.  Well nourished.  NAD    ENT:   + ETT  Mouth with normal mucosa.   No thrush    EYES:   Pupils equal,   Round and reactive to light.    CARDIAC:   Normal rate,   Regular rhythm.    No edema      Vascular:  Normal systolic impulse  No Carotid bruits    RESPIRATORY:   No wheezing  Bilateral BS  Normal chest expansion  +tachypneic,  No use of accessory muscles    GASTROINTESTINAL:  Abdomen soft,   Non-tender,   No guarding,   + BS    GENITOURINARY  normal genitalia for sex  no edema    MUSCULOSKELETAL:   Range of motion is not limited,  No muscle or joint tenderness  No clubbing, cyanosis    NEUROLOGICAL:   Alert and oriented   No motor  deficits.  pertinent DTRs normal    SKIN:   Skin normal color for race,   Warm and dry and intact.   No evidence of rash.    PSYCHIATRIC:   Normal mood and affect.   No apparent risk to self or others.    HEME LYMPH:   No splenomegaly.  No cervical  lymphadenopathy.  no inguinal lymphadenopathy      03-29-20 @ 07:01  -  03-30-20 @ 07:00  --------------------------------------------------------  IN:    dexmedetomidine Infusion: 442.5 mL    fentaNYL Infusion.: 30 mL    IV PiggyBack: 350 mL    midazolam Infusion: 165 mL    norepinephrine Infusion: 312 mL  Total IN: 1299.5 mL    OUT:    Indwelling Catheter - Urethral: 1375 mL  Total OUT: 1375 mL    Total NET: -75.5 mL      03-30-20 @ 07:01  -  03-30-20 @ 09:25  --------------------------------------------------------  IN:  Total IN: 0 mL    OUT:    Indwelling Catheter - Urethral: 50 mL  Total OUT: 50 mL    Total NET: -50 mL          LABS:                            15.7   4.60  )-----------( 161      ( 30 Mar 2020 06:05 )             49.1                                               03-30    142  |  104  |  13  ----------------------------<  138<H>  5.1<H>   |  25  |  1.0    Ca    8.1<L>      30 Mar 2020 06:05  Phos  2.3     03-30  Mg     2.2     03-30    TPro  6.6  /  Alb  3.4<L>  /  TBili  0.4  /  DBili  x   /  AST  35  /  ALT  21  /  AlkPhos  61  03-29      PT/INR - ( 28 Mar 2020 15:00 )   PT: 14.80 sec;   INR: 1.29 ratio         PTT - ( 28 Mar 2020 15:00 )  PTT:30.6 sec                                       Urinalysis Basic - ( 28 Mar 2020 16:00 )    Color: Yellow / Appearance: Slightly Cloudy / SG: >=1.030 / pH: x  Gluc: x / Ketone: 15  / Bili: Small / Urobili: 0.2 mg/dL   Blood: x / Protein: >=300 mg/dL / Nitrite: Negative   Leuk Esterase: Negative / RBC: >50 /HPF / WBC 3-5 /HPF   Sq Epi: x / Non Sq Epi: Occasional /HPF / Bacteria: Moderate        CARDIAC MARKERS ( 28 Mar 2020 15:00 )  x     / <0.01 ng/mL / x     / x     / x                                                LIVER FUNCTIONS - ( 29 Mar 2020 07:22 )  Alb: 3.4 g/dL / Pro: 6.6 g/dL / ALK PHOS: 61 U/L / ALT: 21 U/L / AST: 35 U/L / GGT: x                                                  Culture - Urine (collected 28 Mar 2020 16:00)  Source: .Urine Clean Catch (Midstream)  Final Report (29 Mar 2020 20:18):    No growth    Culture - Blood (collected 28 Mar 2020 15:00)  Source: .Blood Blood-Peripheral  Preliminary Report (29 Mar 2020 23:02):    No growth to date.    Culture - Blood (collected 28 Mar 2020 15:00)  Source: .Blood Blood-Peripheral  Preliminary Report (29 Mar 2020 23:02):    No growth to date.                                                   Mode: AC/ CMV (Assist Control/ Continuous Mandatory Ventilation)  RR (machine): 18  TV (machine): 450  FiO2: 50  PEEP: 16  ITime: 1  MAP: 19  PIP: 25                                      ABG - ( 29 Mar 2020 18:28 )  pH, Arterial: 7.41  pH, Blood: x     /  pCO2: 40    /  pO2: 115   / HCO3: 26    / Base Excess: 1.0   /  SaO2: 99                  MEDICATIONS  (STANDING):  azithromycin  IVPB 500 milliGRAM(s) IV Intermittent every 24 hours  cefepime   IVPB 2000 milliGRAM(s) IV Intermittent every 8 hours  chlorhexidine 0.12% Liquid 15 milliLiter(s) Oral Mucosa two times a day  chlorhexidine 4% Liquid 1 Application(s) Topical two times a day  dexMEDEtomidine Infusion 0.3 MICROgram(s)/kG/Hr (5.96 mL/Hr) IV Continuous <Continuous>  enoxaparin Injectable 40 milliGRAM(s) SubCutaneous daily  fentaNYL   Infusion. 0.5 MICROgram(s)/kG/Hr (4.5 mL/Hr) IV Continuous <Continuous>  hydroxychloroquine 200 milliGRAM(s) Oral every 12 hours  hydroxychloroquine   Oral   midazolam Infusion 0.02 mG/kG/Hr (1.96 mL/Hr) IV Continuous <Continuous>  norepinephrine Infusion 0.05 MICROgram(s)/kG/Min (4.22 mL/Hr) IV Continuous <Continuous>  pantoprazole   Suspension 40 milliGRAM(s) Oral daily  propofol Infusion 10 MICROgram(s)/kG/Min (5.4 mL/Hr) IV Continuous <Continuous>  propofol Infusion 0.5 MICROgram(s)/kG/Min (0.27 mL/Hr) IV Continuous <Continuous>    MEDICATIONS  (PRN):  acetaminophen  Suppository .. 650 milliGRAM(s) Rectal every 4 hours PRN Mild Pain (1 - 3)      New X-rays reviewed:                                                                                  ECHO    CXR interpreted by me: Bilateral opacities  ETT OK, OGT OK

## 2020-03-30 NOTE — PROGRESS NOTE ADULT - SUBJECTIVE AND OBJECTIVE BOX
EBONY MARIE  39y, Male  Allergy: No Known Allergies      CHIEF COMPLAINT: Acute hypoxemic respiratory failure (30 Mar 2020 09:24)      INTERVAL EVENTS/HPI  - No acute events overnight  - T(F): , Max: 103.6 (03-30-20 @ 00:12)  - Denies any worsening symptoms  - Tolerating medication  - WBC Count: 4.60 K/uL (03-30-20 @ 06:05)      ROS  General: Denies fevers, chills, nightsweats, weight loss  HEENT: Denies headache, rhinorrhea, sore throat, eye pain  CV: Denies CP, palpitations  PULM: Denies SOB, cough  GI: Denies abdominal pain, diarrhea  : Denies dysuria, hematuria  MSK: Denies arthralgias  SKIN: Denies rash   NEURO: Denies paresthesias, weakness  PSYCH: Denies depression    FH non-contributory   Social Hx non-contributory    VITALS:  T(F): 103.3, Max: 103.6 (03-30-20 @ 00:12)  HR: 78  BP: 103/65  RR: 25Vital Signs Last 24 Hrs  T(C): 39.6 (30 Mar 2020 11:00), Max: 39.8 (30 Mar 2020 00:12)  T(F): 103.3 (30 Mar 2020 11:00), Max: 103.6 (30 Mar 2020 00:12)  HR: 78 (30 Mar 2020 13:30) (74 - 91)  BP: 103/65 (30 Mar 2020 13:30) (88/58 - 123/61)  BP(mean): 76 (30 Mar 2020 11:12) (68 - 91)  RR: 25 (30 Mar 2020 13:30) (0 - 62)  SpO2: 92% (30 Mar 2020 13:30) (84% - 100%)    PHYSICAL EXAM:  Gen: NAD, resting in bed  HEENT: Normocephalic, atraumatic  Neck: supple, no lymphadenopathy  CV: Regular rate & regular rhythm  Lungs: decreased BS at bases, no fremitus  Abdomen: Soft, BS present  Ext: Warm, well perfused  Neuro: non focal, awake  Skin: no rash, no erythema      TESTS & MEASUREMENTS:                        15.7   4.60  )-----------( 161      ( 30 Mar 2020 06:05 )             49.1     03-30    142  |  104  |  13  ----------------------------<  138<H>  5.1<H>   |  25  |  1.0    Ca    8.1<L>      30 Mar 2020 06:05  Phos  2.3     03-30  Mg     2.2     03-30    TPro  6.6  /  Alb  3.4<L>  /  TBili  0.4  /  DBili  x   /  AST  35  /  ALT  21  /  AlkPhos  61  03-29    eGFR if Non African American: 94 mL/min/1.73M2 (03-30-20 @ 06:05)  eGFR if African American: 109 mL/min/1.73M2 (03-30-20 @ 06:05)    LIVER FUNCTIONS - ( 29 Mar 2020 07:22 )  Alb: 3.4 g/dL / Pro: 6.6 g/dL / ALK PHOS: 61 U/L / ALT: 21 U/L / AST: 35 U/L / GGT: x           Urinalysis Basic - ( 28 Mar 2020 16:00 )    Color: Yellow / Appearance: Slightly Cloudy / SG: >=1.030 / pH: x  Gluc: x / Ketone: 15  / Bili: Small / Urobili: 0.2 mg/dL   Blood: x / Protein: >=300 mg/dL / Nitrite: Negative   Leuk Esterase: Negative / RBC: >50 /HPF / WBC 3-5 /HPF   Sq Epi: x / Non Sq Epi: Occasional /HPF / Bacteria: Moderate        Culture - Urine (collected 03-28-20 @ 16:00)  Source: .Urine Clean Catch (Midstream)  Final Report (03-29-20 @ 20:18):    No growth    Culture - Blood (collected 03-28-20 @ 15:00)  Source: .Blood Blood-Peripheral  Preliminary Report (03-29-20 @ 23:02):    No growth to date.    Culture - Blood (collected 03-28-20 @ 15:00)  Source: .Blood Blood-Peripheral  Preliminary Report (03-29-20 @ 23:02):    No growth to date.    Culture - Urine (collected 03-24-20 @ 03:55)  Source: .Urine Clean Catch (Midstream)  Final Report (03-25-20 @ 11:29):    <10,000 CFU/mL Normal Urogenital     Culture - Blood (collected 03-23-20 @ 23:20)  Source: .Blood Blood  Final Report (03-29-20 @ 18:00):    No growth at 5 days.    Culture - Blood (collected 03-23-20 @ 23:20)  Source: .Blood Blood  Final Report (03-29-20 @ 18:00):    No growth at 5 days.        Lactate, Blood: 1.4 mmol/L (03-28-20 @ 15:00)      INFECTIOUS DISEASES TESTING      RADIOLOGY & ADDITIONAL TESTS:  I have personally reviewed the last Chest xray  CXR      CT      CARDIOLOGY TESTING      MEDICATIONS  azithromycin  IVPB 500  cefepime   IVPB 2000  chlorhexidine 0.12% Liquid 15  chlorhexidine 4% Liquid 1  dexMEDEtomidine Infusion 0.3  enoxaparin Injectable 40  fentaNYL   Infusion. 0.5  hydroxychloroquine 200  hydroxychloroquine   midazolam Infusion 0.02  norepinephrine Infusion 0.05  pantoprazole   Suspension 40  propofol Infusion 10  propofol Infusion 0.5  zinc sulfate 220      ANTIBIOTICS:  azithromycin  IVPB 500 milliGRAM(s) IV Intermittent every 24 hours  cefepime   IVPB 2000 milliGRAM(s) IV Intermittent every 8 hours  hydroxychloroquine 200 milliGRAM(s) Oral every 12 hours  hydroxychloroquine   Oral       All available historical data has been reviewed

## 2020-03-30 NOTE — CHART NOTE - NSCHARTNOTEFT_GEN_A_CORE
Spoke with pt brother Dr. Flores (Anesthesiologist)  at [phone]  and updated him on pts clinical status

## 2020-03-30 NOTE — DIETITIAN INITIAL EVALUATION ADULT. - OTHER INFO
Pt admitted d/t respiratory distress, viral pneumonia. Followed by ID and pulmonology. TF initiated today, 3/30. No edema noted. Skin intact (3/30). No BMs since admission, with audible/active bowel sounds.

## 2020-03-30 NOTE — DIETITIAN INITIAL EVALUATION ADULT. - DIET TYPE
NPO with TF via OGT. Jevity 1.2 total volume 1440 mL/day. Bolus feeds of 360 mL q6hrs. At goal this regimen provides 1728 kcal, 80g protein and 1166 mL free H2O.

## 2020-03-30 NOTE — DIETITIAN INITIAL EVALUATION ADULT. - REASON INDICATOR FOR ASSESSMENT
intubation x48h; Unable to conduct face to face interview/NFPE d/t limited contact precautions r/t COVID19

## 2020-03-30 NOTE — DIETITIAN INITIAL EVALUATION ADULT. - RD TO REMAIN AVAILABLE
yes/INTERVENTION: EN, coordination of care. ME: RD to monitor diet order, energy intake, body composition, NFPF, electrolyte profile

## 2020-03-30 NOTE — PROGRESS NOTE ADULT - SUBJECTIVE AND OBJECTIVE BOX
HPI  intubated / sedated  REVIEW OF SYSTEMS  unable to obtain    PAST MEDICAL & SURGICAL HISTORY  Mentally challenged  High blood cholesterol  No significant past surgical history    SOCIAL HISTORY:  Negative for smoking/alcohol/drug use.     ALLERGIES:  No Known Allergies    MEDICATIONS:  STANDING MEDICATIONS  cefepime   IVPB 2000 milliGRAM(s) IV Intermittent every 8 hours  chlorhexidine 0.12% Liquid 15 milliLiter(s) Oral Mucosa two times a day  chlorhexidine 4% Liquid 1 Application(s) Topical two times a day  dexMEDEtomidine Infusion 0.3 MICROgram(s)/kG/Hr IV Continuous <Continuous>  enoxaparin Injectable 40 milliGRAM(s) SubCutaneous daily  fentaNYL   Infusion. 0.5 MICROgram(s)/kG/Hr IV Continuous <Continuous>  hydroxychloroquine 200 milliGRAM(s) Oral every 12 hours  hydroxychloroquine   Oral   midazolam Infusion 0.02 mG/kG/Hr IV Continuous <Continuous>  norepinephrine Infusion 0.05 MICROgram(s)/kG/Min IV Continuous <Continuous>  pantoprazole   Suspension 40 milliGRAM(s) Oral daily  propofol Infusion 10 MICROgram(s)/kG/Min IV Continuous <Continuous>  propofol Infusion 0.5 MICROgram(s)/kG/Min IV Continuous <Continuous>  zinc sulfate 220 milliGRAM(s) Oral daily    PRN MEDICATIONS  acetaminophen  Suppository .. 650 milliGRAM(s) Rectal every 4 hours PRN    VITALS:   T(F): 97  HR: 76  BP: 106/65  RR: 18  SpO2: 92%    PHYSICAL EXAM:  performed by intensivist  LABS:                        15.7   4.60  )-----------( 161      ( 30 Mar 2020 06:05 )             49.1     03-30    142  |  104  |  13  ----------------------------<  138<H>  5.1<H>   |  25  |  1.0    Ca    8.1<L>      30 Mar 2020 06:05  Phos  2.3     03-30  Mg     2.2     03-30    TPro  6.6  /  Alb  3.4<L>  /  TBili  0.4  /  DBili  x   /  AST  35  /  ALT  21  /  AlkPhos  61  03-29        ABG - ( 29 Mar 2020 18:28 )  pH, Arterial: 7.41  pH, Blood: x     /  pCO2: 40    /  pO2: 115   / HCO3: 26    / Base Excess: 1.0   /  SaO2: 99                    Culture - Urine (collected 28 Mar 2020 16:00)  Source: .Urine Clean Catch (Midstream)  Final Report (29 Mar 2020 20:18):    No growth    Culture - Blood (collected 28 Mar 2020 15:00)  Source: .Blood Blood-Peripheral  Preliminary Report (29 Mar 2020 23:02):    No growth to date.    Culture - Blood (collected 28 Mar 2020 15:00)  Source: .Blood Blood-Peripheral  Preliminary Report (29 Mar 2020 23:02):    No growth to date.          RADIOLOGY:

## 2020-03-30 NOTE — PROGRESS NOTE ADULT - ASSESSMENT
a 39y old  Male with PMHx of developmental delay (nonverbal at baseline), HLD who presents from [facility] for sob. Per chart review, pt was seen in the ED on 3/23 for fever and decreased po intake. Labs at that time were notable for WBC 2, AST/ALT 46/71, negative blood cx x2, cxr with bilateral opacities. Pt tolerated po and was d/c'ed back to [facility]. Pt returned on 3/24 with fever, cough, and vomiting episodes after coughing. UA/urine cx negative, CT abd negative for acute abd pathology but showed RLL ground glass opacity. Pt was d/c'ed back to [facility] and instructed to quarantine for possible COVID. Now returned to ER today for worsening sob over past several days. In the ER, He is febrile, tachycardic and tachypneic 30s-40s, satting 76% on RA. SpO2 improved to 91% on NRB but pt still tachypneic and not tolerating NRB well, so was intubated and started on precedex. Labs notable for WBC 2, AST 42, , trop <0.01. UA negative. Cxr with increased bilateral infiltrates. Pending COVID results.       # Suspected  COVID 19 Pneumonia  # Acute Hypoxic Respiratory failure    would recommend:    1. Follow up cultures and COVID 19 PCR, OBtain G-6PD level  2. Continue HCQ  and Monitor QTc if >500 please discontinue Azithromycin  3. Oral Care and Aspiration precaution  4. Management of Vent as per ICU protocol  5. Monitor  Temp. and c/w supportive care  6. Monitor Kidney function closely and adjust abx doses accordingly a 39y old  Male with PMHx of developmental delay (nonverbal at baseline), HLD who presents from [facility] for sob. Per chart review, pt was seen in the ED on 3/23 for fever and decreased po intake. Labs at that time were notable for WBC 2, AST/ALT 46/71, negative blood cx x2, cxr with bilateral opacities. Pt tolerated po and was d/c'ed back to [facility]. Pt returned on 3/24 with fever, cough, and vomiting episodes after coughing. UA/urine cx negative, CT abd negative for acute abd pathology but showed RLL ground glass opacity. Pt was d/c'ed back to [facility] and instructed to quarantine for possible COVID. Now returned to ER today for worsening sob over past several days. In the ER, He is febrile, tachycardic and tachypneic 30s-40s, satting 76% on RA. SpO2 improved to 91% on NRB but pt still tachypneic and not tolerating NRB well, so was intubated and started on precedex. Labs notable for WBC 2, AST 42, , trop <0.01. UA negative. Cxr with increased bilateral infiltrates. Pending COVID results.       1. Sepsis with SIRS due to COVID 19 Pneumonia  Tmax 103.6  Nl Procalcitonin  # Acute Hypoxic Respiratory failure    PLAN   Continue HCQ  and Monitor QTc;    D/C Azithromycin

## 2020-03-30 NOTE — PROGRESS NOTE ADULT - ASSESSMENT
IMPRESSION:  ARf hpoxic   secondary to viral PNA   COVID  19      PLAN:    CNS: SAT    HEENT:oral care     PULMONARY: lower PEEP to 12     CARDIOVASCULAR: keep IS =OS     GI: GI prophylaxis. NG  Feeding     RENAL: follow lytes IS and OS     INFECTIOUS DISEASE: HCQ continue ABX   ID consult    HEMATOLOGICAL:  DVT prophylaxis.    ENDOCRINE:  Follow up FS.  Insulin protocol if needed.    MUSCULOSKELETAL: Bedrest    ICU monitoring for now.        CRITICAL CARE TIME SPENT: *** IMPRESSION:  ARf hpoxic   secondary to viral PNA   COVID  19      PLAN:    CNS: SAT    HEENT:oral care     PULMONARY: lower PEEP to 12   taper O2 as tolerated    CARDIOVASCULAR: keep IS =OS     GI: GI prophylaxis. NG  Feeding     RENAL: follow lytes IS and OS     INFECTIOUS DISEASE: HCQ continue ABX   ID consult    HEMATOLOGICAL:  DVT prophylaxis.    ENDOCRINE:  Follow up FS.  Insulin protocol if needed.    MUSCULOSKELETAL: Bedrest    ICU monitoring for now.

## 2020-03-30 NOTE — DIETITIAN INITIAL EVALUATION ADULT. - ENERGY NEEDS
Ve: 8.7, Tmax 39.8  kcal: 0885-6414 obtained by comparing  3962-3701 kcal (60-70%2405 kcal PSU 2003b) vs 3518-5145 kcal (11-14 kcal/kg CBW) vs 0297-3358 (22-25 kcal/kg IBW)  protein:  g (1.5-2 g/kg IBW)  fluid: per LIP

## 2020-03-30 NOTE — DIETITIAN INITIAL EVALUATION ADULT. - PERTINENT MEDS FT
lovenox, plaquenil, maxipime, zinc sulfate, precedex, versed, levophed, protonix, propofol (active in meds, not currently running as of this assessment)

## 2020-03-30 NOTE — DIETITIAN INITIAL EVALUATION ADULT. - ENTERAL
Adjust TF to the following regimen: Vital HP total volume 1440 mL/day. Continuous rate initiated at 10mL/h, increase by 10mL/h as tolerated until goal rate of 60mL/h is achieved. At goal, this regimen will provide 1440 kcal, 126 kcal and 1210 mL free H2O. Additional fluid flushes per LIP.

## 2020-03-30 NOTE — DIETITIAN INITIAL EVALUATION ADULT. - ADD RECOMMEND
adjust TF to the above regimen, maintain aspiration precautions, hold TF if MAP <65, monitor/replete electrolytes PRN

## 2020-03-30 NOTE — PROGRESS NOTE ADULT - ASSESSMENT
39 M w/ PMH of Development delay / Nonverbal / from a group home brought in on 3/23 w/ fever - was COVID 19 + -> then dc -> then p/w AHRF 76% on RA -> intubated in ER - CXR shows bilateral opacities    # Acute Hypoxic Respiratory Failure secondary to ARDS from Sepsis due to COVID 19 Pneumonia  - C/w PLaquenil  - Zinc  - Dc azithromycin  - lower peep to 12  - start tube feeds    DVTppx  GIppx    FULL CODE

## 2020-03-31 LAB
ALBUMIN SERPL ELPH-MCNC: 2.7 G/DL — LOW (ref 3.5–5.2)
ALP SERPL-CCNC: 68 U/L — SIGNIFICANT CHANGE UP (ref 30–115)
ALT FLD-CCNC: 15 U/L — SIGNIFICANT CHANGE UP (ref 0–41)
ANION GAP SERPL CALC-SCNC: 11 MMOL/L — SIGNIFICANT CHANGE UP (ref 7–14)
AST SERPL-CCNC: 31 U/L — SIGNIFICANT CHANGE UP (ref 0–41)
BASOPHILS # BLD AUTO: 0.01 K/UL — SIGNIFICANT CHANGE UP (ref 0–0.2)
BASOPHILS NFR BLD AUTO: 0.3 % — SIGNIFICANT CHANGE UP (ref 0–1)
BILIRUB SERPL-MCNC: 1.2 MG/DL — SIGNIFICANT CHANGE UP (ref 0.2–1.2)
BUN SERPL-MCNC: 14 MG/DL — SIGNIFICANT CHANGE UP (ref 10–20)
CALCIUM SERPL-MCNC: 8.2 MG/DL — LOW (ref 8.5–10.1)
CHLORIDE SERPL-SCNC: 104 MMOL/L — SIGNIFICANT CHANGE UP (ref 98–110)
CO2 SERPL-SCNC: 26 MMOL/L — SIGNIFICANT CHANGE UP (ref 17–32)
CREAT SERPL-MCNC: 1 MG/DL — SIGNIFICANT CHANGE UP (ref 0.7–1.5)
EOSINOPHIL # BLD AUTO: 0 K/UL — SIGNIFICANT CHANGE UP (ref 0–0.7)
EOSINOPHIL NFR BLD AUTO: 0 % — SIGNIFICANT CHANGE UP (ref 0–8)
GAS PNL BLDA: SIGNIFICANT CHANGE UP
GAS PNL BLDA: SIGNIFICANT CHANGE UP
GLUCOSE SERPL-MCNC: 159 MG/DL — HIGH (ref 70–99)
HCT VFR BLD CALC: 48.1 % — SIGNIFICANT CHANGE UP (ref 42–52)
HGB BLD-MCNC: 15.3 G/DL — SIGNIFICANT CHANGE UP (ref 14–18)
IMM GRANULOCYTES NFR BLD AUTO: 0.9 % — HIGH (ref 0.1–0.3)
LYMPHOCYTES # BLD AUTO: 0.62 K/UL — LOW (ref 1.2–3.4)
LYMPHOCYTES # BLD AUTO: 17.7 % — LOW (ref 20.5–51.1)
MAGNESIUM SERPL-MCNC: 2 MG/DL — SIGNIFICANT CHANGE UP (ref 1.8–2.4)
MCHC RBC-ENTMCNC: 28.7 PG — SIGNIFICANT CHANGE UP (ref 27–31)
MCHC RBC-ENTMCNC: 31.8 G/DL — LOW (ref 32–37)
MCV RBC AUTO: 90.1 FL — SIGNIFICANT CHANGE UP (ref 80–94)
MONOCYTES # BLD AUTO: 0.17 K/UL — SIGNIFICANT CHANGE UP (ref 0.1–0.6)
MONOCYTES NFR BLD AUTO: 4.9 % — SIGNIFICANT CHANGE UP (ref 1.7–9.3)
NEUTROPHILS # BLD AUTO: 2.67 K/UL — SIGNIFICANT CHANGE UP (ref 1.4–6.5)
NEUTROPHILS NFR BLD AUTO: 76.2 % — HIGH (ref 42.2–75.2)
NRBC # BLD: 0 /100 WBCS — SIGNIFICANT CHANGE UP (ref 0–0)
PLATELET # BLD AUTO: 200 K/UL — SIGNIFICANT CHANGE UP (ref 130–400)
POTASSIUM SERPL-MCNC: 4.7 MMOL/L — SIGNIFICANT CHANGE UP (ref 3.5–5)
POTASSIUM SERPL-SCNC: 4.7 MMOL/L — SIGNIFICANT CHANGE UP (ref 3.5–5)
PROT SERPL-MCNC: 6.1 G/DL — SIGNIFICANT CHANGE UP (ref 6–8)
RBC # BLD: 5.34 M/UL — SIGNIFICANT CHANGE UP (ref 4.7–6.1)
RBC # FLD: 16.8 % — HIGH (ref 11.5–14.5)
SODIUM SERPL-SCNC: 141 MMOL/L — SIGNIFICANT CHANGE UP (ref 135–146)
WBC # BLD: 3.5 K/UL — LOW (ref 4.8–10.8)
WBC # FLD AUTO: 3.5 K/UL — LOW (ref 4.8–10.8)

## 2020-03-31 PROCEDURE — 36556 INSERT NON-TUNNEL CV CATH: CPT

## 2020-03-31 PROCEDURE — 99233 SBSQ HOSP IP/OBS HIGH 50: CPT | Mod: GC

## 2020-03-31 PROCEDURE — 71045 X-RAY EXAM CHEST 1 VIEW: CPT | Mod: 26,77

## 2020-03-31 PROCEDURE — 99233 SBSQ HOSP IP/OBS HIGH 50: CPT | Mod: 25

## 2020-03-31 PROCEDURE — 71045 X-RAY EXAM CHEST 1 VIEW: CPT | Mod: 26

## 2020-03-31 RX ORDER — SODIUM CHLORIDE 9 MG/ML
500 INJECTION, SOLUTION INTRAVENOUS ONCE
Refills: 0 | Status: COMPLETED | OUTPATIENT
Start: 2020-03-31 | End: 2020-03-31

## 2020-03-31 RX ORDER — ACETAMINOPHEN 500 MG
650 TABLET ORAL ONCE
Refills: 0 | Status: COMPLETED | OUTPATIENT
Start: 2020-03-31 | End: 2020-03-31

## 2020-03-31 RX ORDER — ACETAMINOPHEN 500 MG
650 TABLET ORAL EVERY 6 HOURS
Refills: 0 | Status: DISCONTINUED | OUTPATIENT
Start: 2020-03-31 | End: 2020-04-08

## 2020-03-31 RX ADMIN — Medication 650 MILLIGRAM(S): at 17:45

## 2020-03-31 RX ADMIN — Medication 200 MILLIGRAM(S): at 05:06

## 2020-03-31 RX ADMIN — MIDAZOLAM HYDROCHLORIDE 1.96 MG/KG/HR: 1 INJECTION, SOLUTION INTRAMUSCULAR; INTRAVENOUS at 08:47

## 2020-03-31 RX ADMIN — CHLORHEXIDINE GLUCONATE 1 APPLICATION(S): 213 SOLUTION TOPICAL at 05:06

## 2020-03-31 RX ADMIN — CHLORHEXIDINE GLUCONATE 15 MILLILITER(S): 213 SOLUTION TOPICAL at 05:07

## 2020-03-31 RX ADMIN — Medication 4.22 MICROGRAM(S)/KG/MIN: at 08:48

## 2020-03-31 RX ADMIN — CEFEPIME 100 MILLIGRAM(S): 1 INJECTION, POWDER, FOR SOLUTION INTRAMUSCULAR; INTRAVENOUS at 05:07

## 2020-03-31 RX ADMIN — Medication 200 MILLIGRAM(S): at 17:35

## 2020-03-31 RX ADMIN — Medication 4.22 MICROGRAM(S)/KG/MIN: at 19:30

## 2020-03-31 RX ADMIN — Medication 650 MILLIGRAM(S): at 23:41

## 2020-03-31 RX ADMIN — Medication 650 MILLIGRAM(S): at 01:48

## 2020-03-31 RX ADMIN — DEXMEDETOMIDINE HYDROCHLORIDE IN 0.9% SODIUM CHLORIDE 5.96 MICROGRAM(S)/KG/HR: 4 INJECTION INTRAVENOUS at 05:06

## 2020-03-31 RX ADMIN — CHLORHEXIDINE GLUCONATE 15 MILLILITER(S): 213 SOLUTION TOPICAL at 17:42

## 2020-03-31 RX ADMIN — DEXMEDETOMIDINE HYDROCHLORIDE IN 0.9% SODIUM CHLORIDE 5.96 MICROGRAM(S)/KG/HR: 4 INJECTION INTRAVENOUS at 19:30

## 2020-03-31 RX ADMIN — Medication 650 MILLIGRAM(S): at 11:35

## 2020-03-31 RX ADMIN — Medication 650 MILLIGRAM(S): at 05:06

## 2020-03-31 RX ADMIN — DEXMEDETOMIDINE HYDROCHLORIDE IN 0.9% SODIUM CHLORIDE 5.96 MICROGRAM(S)/KG/HR: 4 INJECTION INTRAVENOUS at 06:14

## 2020-03-31 RX ADMIN — CHLORHEXIDINE GLUCONATE 1 APPLICATION(S): 213 SOLUTION TOPICAL at 17:44

## 2020-03-31 RX ADMIN — SODIUM CHLORIDE 1000 MILLILITER(S): 9 INJECTION, SOLUTION INTRAVENOUS at 14:04

## 2020-03-31 RX ADMIN — MIDAZOLAM HYDROCHLORIDE 1.96 MG/KG/HR: 1 INJECTION, SOLUTION INTRAMUSCULAR; INTRAVENOUS at 19:30

## 2020-03-31 RX ADMIN — Medication 650 MILLIGRAM(S): at 00:09

## 2020-03-31 RX ADMIN — ENOXAPARIN SODIUM 40 MILLIGRAM(S): 100 INJECTION SUBCUTANEOUS at 11:16

## 2020-03-31 RX ADMIN — PANTOPRAZOLE SODIUM 40 MILLIGRAM(S): 20 TABLET, DELAYED RELEASE ORAL at 11:16

## 2020-03-31 RX ADMIN — DEXMEDETOMIDINE HYDROCHLORIDE IN 0.9% SODIUM CHLORIDE 5.96 MICROGRAM(S)/KG/HR: 4 INJECTION INTRAVENOUS at 08:46

## 2020-03-31 NOTE — PROGRESS NOTE ADULT - SUBJECTIVE AND OBJECTIVE BOX
Patient is a 39y old  Male who presents with a chief complaint of Acute hypoxemic respiratory failure (30 Mar 2020 17:16)        Over Night Events:    remains intubated, sedated    PHYSICAL EXAM    ICU Vital Signs Last 24 Hrs  T(C): 37.7 (31 Mar 2020 09:34), Max: 39.6 (30 Mar 2020 11:00)  T(F): 99.9 (31 Mar 2020 09:34), Max: 103.3 (30 Mar 2020 11:00)  HR: 66 (31 Mar 2020 09:34) (66 - 82)  BP: 110/76 (31 Mar 2020 09:34) (97/54 - 115/80)  BP(mean): 76 (30 Mar 2020 11:12) (76 - 76)  ABP: --  ABP(mean): --  RR: 18 (31 Mar 2020 09:34) (18 - 36)  SpO2: 100% (31 Mar 2020 09:34) (92% - 100%)      General: Not in distress  HEENT: TIFFANIE             Lymphatic system: No lymphadenopathy  Lungs: Bilateral BS  Cardiovascular: Regular   Gastrointestinal: Soft, Positive BS  Extremities: Moves extremities.    Skin: Warm, intact  Neurological: No motor or sensory deficit       03-30-20 @ 07:01  -  03-31-20 @ 07:00  --------------------------------------------------------  IN:    dexmedetomidine Infusion: 405 mL    Enteral Tube Flush: 200 mL    IV PiggyBack: 50 mL    midazolam Infusion: 126 mL    norepinephrine Infusion: 248 mL    ns in tub fed  [id number]: 1080 mL  Total IN: 2109 mL    OUT:    Indwelling Catheter - Urethral: 1265 mL  Total OUT: 1265 mL    Total NET: 844 mL      03-31-20 @ 07:01  -  03-31-20 @ 10:14  --------------------------------------------------------  IN:  Total IN: 0 mL    OUT:    Indwelling Catheter - Urethral: 90 mL  Total OUT: 90 mL    Total NET: -90 mL          LABS:                            15.3   3.50  )-----------( 200      ( 31 Mar 2020 08:36 )             48.1                                               03-31    141  |  104  |  14  ----------------------------<  159<H>  4.7   |  26  |  1.0    Ca    8.2<L>      31 Mar 2020 08:36  Phos  2.3     03-30  Mg     2.0     03-31    TPro  6.1  /  Alb  2.7<L>  /  TBili  1.2  /  DBili  x   /  AST  31  /  ALT  15  /  AlkPhos  68  03-31                                                                                           LIVER FUNCTIONS - ( 31 Mar 2020 08:36 )  Alb: 2.7 g/dL / Pro: 6.1 g/dL / ALK PHOS: 68 U/L / ALT: 15 U/L / AST: 31 U/L / GGT: x                                                  Culture - Urine (collected 28 Mar 2020 16:00)  Source: .Urine Clean Catch (Midstream)  Final Report (29 Mar 2020 20:18):    No growth    Culture - Blood (collected 28 Mar 2020 15:00)  Source: .Blood Blood-Peripheral  Preliminary Report (29 Mar 2020 23:02):    No growth to date.    Culture - Blood (collected 28 Mar 2020 15:00)  Source: .Blood Blood-Peripheral  Preliminary Report (29 Mar 2020 23:02):    No growth to date.                                                   Mode: Auto Mode: PRVC/ Volume Support  RR (machine): 18  TV (machine): 430  FiO2: 60  PEEP: 12  MAP: 15  PIP: 24                                      ABG - ( 31 Mar 2020 05:07 )  pH, Arterial: 7.43  pH, Blood: x     /  pCO2: 39    /  pO2: 55    / HCO3: 26    / Base Excess: 2.2   /  SaO2: 90                  CXR:    ECHO:    MEDICATIONS  (STANDING):  cefepime   IVPB 2000 milliGRAM(s) IV Intermittent every 8 hours  chlorhexidine 0.12% Liquid 15 milliLiter(s) Oral Mucosa two times a day  chlorhexidine 4% Liquid 1 Application(s) Topical two times a day  dexMEDEtomidine Infusion 0.3 MICROgram(s)/kG/Hr (5.96 mL/Hr) IV Continuous <Continuous>  enoxaparin Injectable 40 milliGRAM(s) SubCutaneous daily  fentaNYL   Infusion. 0.5 MICROgram(s)/kG/Hr (4.5 mL/Hr) IV Continuous <Continuous>  hydroxychloroquine 200 milliGRAM(s) Oral every 12 hours  hydroxychloroquine   Oral   midazolam Infusion 0.02 mG/kG/Hr (1.96 mL/Hr) IV Continuous <Continuous>  norepinephrine Infusion 0.05 MICROgram(s)/kG/Min (4.22 mL/Hr) IV Continuous <Continuous>  pantoprazole   Suspension 40 milliGRAM(s) Oral daily  propofol Infusion 10 MICROgram(s)/kG/Min (5.4 mL/Hr) IV Continuous <Continuous>  propofol Infusion 0.5 MICROgram(s)/kG/Min (0.27 mL/Hr) IV Continuous <Continuous>  zinc sulfate 220 milliGRAM(s) Oral daily    MEDICATIONS  (PRN):  acetaminophen  Suppository .. 650 milliGRAM(s) Rectal every 4 hours PRN Mild Pain (1 - 3) Patient is a 39y old  Male who presents with a chief complaint of Acute hypoxemic respiratory failure (30 Mar 2020 17:16)        Over Night Events:    remains intubated, sedated    PHYSICAL EXAM    ICU Vital Signs Last 24 Hrs  T(C): 37.7 (31 Mar 2020 09:34), Max: 39.6 (30 Mar 2020 11:00)  T(F): 99.9 (31 Mar 2020 09:34), Max: 103.3 (30 Mar 2020 11:00)  HR: 66 (31 Mar 2020 09:34) (66 - 82)  BP: 110/76 (31 Mar 2020 09:34) (97/54 - 115/80)  BP(mean): 76 (30 Mar 2020 11:12) (76 - 76)  ABP: --  ABP(mean): --  RR: 18 (31 Mar 2020 09:34) (18 - 36)  SpO2: 100% (31 Mar 2020 09:34) (92% - 100%)      General: Not in distress  HEENT: TIFFANIE             Lymphatic system: No lymphadenopathy  Lungs: Bilateral BS CTA  Cardiovascular: Regular   Gastrointestinal: Soft, Positive BS  Extremities: no edema  Skin: Warm, intact  Neurological: sedated      03-30-20 @ 07:01  -  03-31-20 @ 07:00  --------------------------------------------------------  IN:    dexmedetomidine Infusion: 405 mL    Enteral Tube Flush: 200 mL    IV PiggyBack: 50 mL    midazolam Infusion: 126 mL    norepinephrine Infusion: 248 mL    ns in tub fed  [id number]: 1080 mL  Total IN: 2109 mL    OUT:    Indwelling Catheter - Urethral: 1265 mL  Total OUT: 1265 mL    Total NET: 844 mL      03-31-20 @ 07:01  -  03-31-20 @ 10:14  --------------------------------------------------------  IN:  Total IN: 0 mL    OUT:    Indwelling Catheter - Urethral: 90 mL  Total OUT: 90 mL    Total NET: -90 mL          LABS:                            15.3   3.50  )-----------( 200      ( 31 Mar 2020 08:36 )             48.1                                               03-31    141  |  104  |  14  ----------------------------<  159<H>  4.7   |  26  |  1.0    Ca    8.2<L>      31 Mar 2020 08:36  Phos  2.3     03-30  Mg     2.0     03-31    TPro  6.1  /  Alb  2.7<L>  /  TBili  1.2  /  DBili  x   /  AST  31  /  ALT  15  /  AlkPhos  68  03-31                                                                                           LIVER FUNCTIONS - ( 31 Mar 2020 08:36 )  Alb: 2.7 g/dL / Pro: 6.1 g/dL / ALK PHOS: 68 U/L / ALT: 15 U/L / AST: 31 U/L / GGT: x                                                  Culture - Urine (collected 28 Mar 2020 16:00)  Source: .Urine Clean Catch (Midstream)  Final Report (29 Mar 2020 20:18):    No growth    Culture - Blood (collected 28 Mar 2020 15:00)  Source: .Blood Blood-Peripheral  Preliminary Report (29 Mar 2020 23:02):    No growth to date.    Culture - Blood (collected 28 Mar 2020 15:00)  Source: .Blood Blood-Peripheral  Preliminary Report (29 Mar 2020 23:02):    No growth to date.                                                   Mode: Auto Mode: PRVC/ Volume Support  RR (machine): 18  TV (machine): 430  FiO2: 60  PEEP: 12  MAP: 15  PIP: 24                                      ABG - ( 31 Mar 2020 05:07 )  pH, Arterial: 7.43  pH, Blood: x     /  pCO2: 39    /  pO2: 55    / HCO3: 26    / Base Excess: 2.2   /  SaO2: 90                  CXR: marcelo infiltrates, ett and og ok    ECHO:    MEDICATIONS  (STANDING):  cefepime   IVPB 2000 milliGRAM(s) IV Intermittent every 8 hours  chlorhexidine 0.12% Liquid 15 milliLiter(s) Oral Mucosa two times a day  chlorhexidine 4% Liquid 1 Application(s) Topical two times a day  dexMEDEtomidine Infusion 0.3 MICROgram(s)/kG/Hr (5.96 mL/Hr) IV Continuous <Continuous>  enoxaparin Injectable 40 milliGRAM(s) SubCutaneous daily  fentaNYL   Infusion. 0.5 MICROgram(s)/kG/Hr (4.5 mL/Hr) IV Continuous <Continuous>  hydroxychloroquine 200 milliGRAM(s) Oral every 12 hours  hydroxychloroquine   Oral   midazolam Infusion 0.02 mG/kG/Hr (1.96 mL/Hr) IV Continuous <Continuous>  norepinephrine Infusion 0.05 MICROgram(s)/kG/Min (4.22 mL/Hr) IV Continuous <Continuous>  pantoprazole   Suspension 40 milliGRAM(s) Oral daily  propofol Infusion 10 MICROgram(s)/kG/Min (5.4 mL/Hr) IV Continuous <Continuous>  propofol Infusion 0.5 MICROgram(s)/kG/Min (0.27 mL/Hr) IV Continuous <Continuous>  zinc sulfate 220 milliGRAM(s) Oral daily    MEDICATIONS  (PRN):  acetaminophen  Suppository .. 650 milliGRAM(s) Rectal every 4 hours PRN Mild Pain (1 - 3)

## 2020-03-31 NOTE — PROGRESS NOTE ADULT - ASSESSMENT
39 M w/ PMH of Development delay / Nonverbal / from a group home brought in on 3/23 w/ fever - was COVID 19 + -> then dc -> then p/w AHRF 76% on RA -> intubated in ER - CXR shows bilateral opacities    # Acute Hypoxic Respiratory Failure secondary to ARDS from Sepsis due to COVID 19 Pneumonia  - C/w PLaquenil  - pt febrile femoral central line dc'd   -  tube feeds   - I spoke with family over phone and updated them on pts status and plan all questions answered     DVTppx  GIppx    FULL CODE

## 2020-03-31 NOTE — PROGRESS NOTE ADULT - SUBJECTIVE AND OBJECTIVE BOX
Patient is comfortably sedated on ventilator      T(F): 99.8 (03-31-20 @ 13:00), Max: 103.3 (03-31-20 @ 11:04)  HR: 72 (03-31-20 @ 15:00)  BP: 110/73 (03-31-20 @ 15:00)  RR: 18 (03-31-20 @ 15:00)  SpO2: 100% (03-31-20 @ 09:34) (92% - 100%)    PHYSICAL EXAM:  GENERAL: NAD  HEAD:  Atraumatic, Normocephalic  NERVOUS SYSTEM:  no focal deficits  CHEST/LUNG: B/L Rales   HEART: Regular rate and rhythm; No murmurs, rubs, or gallops  ABDOMEN: Soft, Nontender, Nondistended; Bowel sounds present  EXTREMITIES:  2+ Peripheral Pulses, No clubbing, cyanosis, or edema  LYMPH: No lymphadenopathy noted  SKIN: No rashes or lesions    LABS  03-31    141  |  104  |  14  ----------------------------<  159<H>  4.7   |  26  |  1.0    Ca    8.2<L>      31 Mar 2020 08:36  Phos  2.3     03-30  Mg     2.0     03-31    TPro  6.1  /  Alb  2.7<L>  /  TBili  1.2  /  DBili  x   /  AST  31  /  ALT  15  /  AlkPhos  68  03-31                          15.3   3.50  )-----------( 200      ( 31 Mar 2020 08:36 )             48.1       Mode: Auto Mode: PRVC/ Volume Support  RR (machine): 18  TV (machine): 430  FiO2: 100  PEEP: 12  MAP: 15  PIP: 24      Culture Results:   No growth (03-28-20)  Culture Results:   No growth to date. (03-28-20)  Culture Results:   No growth to date. (03-28-20)  Culture Results:   <10,000 CFU/mL Normal Urogenital  (03-24-20)  Culture Results:   No growth at 5 days. (03-23-20)  Culture Results:   No growth at 5 days. (03-23-20)    RADIOLOGY  < from: Xray Chest 1 View- PORTABLE-Urgent (03.31.20 @ 14:20) >  Impression:        1. Stable support lines and tubes  2. Bilateral, left greater than right interstitial opacities consistent with pneumonia in the appropriate clinical setting    < end of copied text >    MEDICATIONS  (STANDING):  chlorhexidine 0.12% Liquid 15 milliLiter(s) Oral Mucosa two times a day  chlorhexidine 4% Liquid 1 Application(s) Topical two times a day  dexMEDEtomidine Infusion 0.3 MICROgram(s)/kG/Hr (5.96 mL/Hr) IV Continuous <Continuous>  enoxaparin Injectable 40 milliGRAM(s) SubCutaneous daily  fentaNYL   Infusion. 0.5 MICROgram(s)/kG/Hr (4.5 mL/Hr) IV Continuous <Continuous>  hydroxychloroquine 200 milliGRAM(s) Oral every 12 hours  hydroxychloroquine   Oral   midazolam Infusion 0.02 mG/kG/Hr (1.96 mL/Hr) IV Continuous <Continuous>  norepinephrine Infusion 0.05 MICROgram(s)/kG/Min (4.22 mL/Hr) IV Continuous <Continuous>  pantoprazole   Suspension 40 milliGRAM(s) Oral daily  propofol Infusion 10 MICROgram(s)/kG/Min (5.4 mL/Hr) IV Continuous <Continuous>  propofol Infusion 0.5 MICROgram(s)/kG/Min (0.27 mL/Hr) IV Continuous <Continuous>  zinc sulfate 220 milliGRAM(s) Oral daily    MEDICATIONS  (PRN):  acetaminophen    Suspension .. 650 milliGRAM(s) Oral every 6 hours PRN Temp greater or equal to 38C (100.4F)  acetaminophen  Suppository .. 650 milliGRAM(s) Rectal every 4 hours PRN Mild Pain (1 - 3)

## 2020-03-31 NOTE — PROGRESS NOTE ADULT - ASSESSMENT
IMPRESSION:    Acute hypoxic respiratory failure  COVID PNA doubt superinfection  HO Developmental delay from group home    PLAN:    CNS: morphine/precedex    HEENT: oral care     PULMONARY: increase PEEP to 14    CARDIOVASCULAR: keep IS =OS, wean levophed, 500LR bolus    GI: GI prophylaxis. OG Feeding     RENAL: follow lytes IS and OS     INFECTIOUS DISEASE: HCQ, tracheal aspirate, DC abx, PCT 0.09, monitor CRP ferritin and LDH    HEMATOLOGICAL:  DVT prophylaxis.    ENDOCRINE:  Follow up FS.  Insulin protocol if needed.    MUSCULOSKELETAL: Bedrest    ICU monitoring for now.

## 2020-04-01 LAB
CULTURE RESULTS: SIGNIFICANT CHANGE UP
CULTURE RESULTS: SIGNIFICANT CHANGE UP
SPECIMEN SOURCE: SIGNIFICANT CHANGE UP
SPECIMEN SOURCE: SIGNIFICANT CHANGE UP

## 2020-04-01 PROCEDURE — 71045 X-RAY EXAM CHEST 1 VIEW: CPT | Mod: 26

## 2020-04-01 PROCEDURE — 99233 SBSQ HOSP IP/OBS HIGH 50: CPT

## 2020-04-01 PROCEDURE — 93970 EXTREMITY STUDY: CPT | Mod: 26

## 2020-04-01 PROCEDURE — 99233 SBSQ HOSP IP/OBS HIGH 50: CPT | Mod: GC

## 2020-04-01 RX ORDER — VASOPRESSIN 20 [USP'U]/ML
0.01 INJECTION INTRAVENOUS
Qty: 50 | Refills: 0 | Status: DISCONTINUED | OUTPATIENT
Start: 2020-04-01 | End: 2020-04-06

## 2020-04-01 RX ORDER — DEXMEDETOMIDINE HYDROCHLORIDE IN 0.9% SODIUM CHLORIDE 4 UG/ML
0.2 INJECTION INTRAVENOUS
Qty: 200 | Refills: 0 | Status: DISCONTINUED | OUTPATIENT
Start: 2020-04-01 | End: 2020-04-06

## 2020-04-01 RX ORDER — TOCILIZUMAB 20 MG/ML
400 INJECTION, SOLUTION, CONCENTRATE INTRAVENOUS ONCE
Refills: 0 | Status: COMPLETED | OUTPATIENT
Start: 2020-04-01 | End: 2020-04-01

## 2020-04-01 RX ORDER — SODIUM CHLORIDE 9 MG/ML
500 INJECTION INTRAMUSCULAR; INTRAVENOUS; SUBCUTANEOUS ONCE
Refills: 0 | Status: COMPLETED | OUTPATIENT
Start: 2020-04-01 | End: 2020-04-01

## 2020-04-01 RX ADMIN — Medication 4.22 MICROGRAM(S)/KG/MIN: at 16:52

## 2020-04-01 RX ADMIN — MIDAZOLAM HYDROCHLORIDE 1.96 MG/KG/HR: 1 INJECTION, SOLUTION INTRAMUSCULAR; INTRAVENOUS at 11:47

## 2020-04-01 RX ADMIN — DEXMEDETOMIDINE HYDROCHLORIDE IN 0.9% SODIUM CHLORIDE 4.89 MICROGRAM(S)/KG/HR: 4 INJECTION INTRAVENOUS at 16:20

## 2020-04-01 RX ADMIN — Medication 650 MILLIGRAM(S): at 06:16

## 2020-04-01 RX ADMIN — CHLORHEXIDINE GLUCONATE 1 APPLICATION(S): 213 SOLUTION TOPICAL at 05:42

## 2020-04-01 RX ADMIN — CHLORHEXIDINE GLUCONATE 15 MILLILITER(S): 213 SOLUTION TOPICAL at 05:39

## 2020-04-01 RX ADMIN — DEXMEDETOMIDINE HYDROCHLORIDE IN 0.9% SODIUM CHLORIDE 4.89 MICROGRAM(S)/KG/HR: 4 INJECTION INTRAVENOUS at 13:15

## 2020-04-01 RX ADMIN — CHLORHEXIDINE GLUCONATE 15 MILLILITER(S): 213 SOLUTION TOPICAL at 17:46

## 2020-04-01 RX ADMIN — TOCILIZUMAB 120 MILLIGRAM(S): 20 INJECTION, SOLUTION, CONCENTRATE INTRAVENOUS at 16:51

## 2020-04-01 RX ADMIN — VASOPRESSIN 0.6 UNIT(S)/MIN: 20 INJECTION INTRAVENOUS at 16:20

## 2020-04-01 RX ADMIN — SODIUM CHLORIDE 500 MILLILITER(S): 9 INJECTION INTRAMUSCULAR; INTRAVENOUS; SUBCUTANEOUS at 15:35

## 2020-04-01 RX ADMIN — DEXMEDETOMIDINE HYDROCHLORIDE IN 0.9% SODIUM CHLORIDE 4.89 MICROGRAM(S)/KG/HR: 4 INJECTION INTRAVENOUS at 21:00

## 2020-04-01 RX ADMIN — MIDAZOLAM HYDROCHLORIDE 1.96 MG/KG/HR: 1 INJECTION, SOLUTION INTRAMUSCULAR; INTRAVENOUS at 20:18

## 2020-04-01 RX ADMIN — CHLORHEXIDINE GLUCONATE 1 APPLICATION(S): 213 SOLUTION TOPICAL at 17:46

## 2020-04-01 RX ADMIN — Medication 200 MILLIGRAM(S): at 05:40

## 2020-04-01 RX ADMIN — ENOXAPARIN SODIUM 40 MILLIGRAM(S): 100 INJECTION SUBCUTANEOUS at 11:08

## 2020-04-01 RX ADMIN — Medication 200 MILLIGRAM(S): at 17:46

## 2020-04-01 RX ADMIN — SODIUM CHLORIDE 1000 MILLILITER(S): 9 INJECTION INTRAMUSCULAR; INTRAVENOUS; SUBCUTANEOUS at 15:46

## 2020-04-01 RX ADMIN — PANTOPRAZOLE SODIUM 40 MILLIGRAM(S): 20 TABLET, DELAYED RELEASE ORAL at 11:08

## 2020-04-01 NOTE — PROGRESS NOTE ADULT - SUBJECTIVE AND OBJECTIVE BOX
Patient is sedated on ventilator       T(F): 100.7 (04-01-20 @ 11:10), Max: 103.1 (03-31-20 @ 17:00)  HR: 72 (04-01-20 @ 11:10)  BP: 82/54 (04-01-20 @ 11:10)  RR: 29 (04-01-20 @ 11:10)  SpO2: 98% (04-01-20 @ 10:19) (98% - 98%)    PHYSICAL EXAM:  GENERAL: NAD  HEAD:  Atraumatic, Normocephalic  NERVOUS SYSTEM:  no focal deficits   CHEST/LUNG: b/l rales  HEART: Regular rate and rhythm; No murmurs, rubs, or gallops  ABDOMEN: Soft, Nontender, Nondistended; Bowel sounds present  EXTREMITIES:  2+ Peripheral Pulses, No clubbing, cyanosis, or edema  LYMPH: No lymphadenopathy noted  SKIN: No rashes or lesions    LABS  03-31    141  |  104  |  14  ----------------------------<  159<H>  4.7   |  26  |  1.0    Ca    8.2<L>      31 Mar 2020 08:36  Mg     2.0     03-31    TPro  6.1  /  Alb  2.7<L>  /  TBili  1.2  /  DBili  x   /  AST  31  /  ALT  15  /  AlkPhos  68  03-31                          15.3   3.50  )-----------( 200      ( 31 Mar 2020 08:36 )             48.1       Mode: Auto Mode: PRVC/ Volume Support  RR (machine): 18  TV (machine): 450  FiO2: 100  PEEP: 12  MAP: 17  PIP: 17    Culture Results:   No growth (03-28-20)  Culture Results:   No growth to date. (03-28-20)  Culture Results:   No growth to date. (03-28-20)  Culture Results:   <10,000 CFU/mL Normal Urogenital  (03-24-20)  Culture Results:   No growth at 5 days. (03-23-20)  Culture Results:   No growth at 5 days. (03-23-20)    RADIOLOGY  < from: Xray Chest 1 View-PORTABLE IMMEDIATE (04.01.20 @ 11:37) >  Impression:      Diffuse opacities, most prominent at the left base and is worse compared to prior.    < end of copied text >    MEDICATIONS  (STANDING):  chlorhexidine 0.12% Liquid 15 milliLiter(s) Oral Mucosa two times a day  chlorhexidine 4% Liquid 1 Application(s) Topical two times a day  dexMEDEtomidine Infusion 0.2 MICROgram(s)/kG/Hr (4.89 mL/Hr) IV Continuous <Continuous>  enoxaparin Injectable 40 milliGRAM(s) SubCutaneous daily  hydroxychloroquine 200 milliGRAM(s) Oral every 12 hours  hydroxychloroquine   Oral   midazolam Infusion 0.02 mG/kG/Hr (1.96 mL/Hr) IV Continuous <Continuous>  norepinephrine Infusion 0.05 MICROgram(s)/kG/Min (4.22 mL/Hr) IV Continuous <Continuous>  pantoprazole   Suspension 40 milliGRAM(s) Oral daily  zinc sulfate 220 milliGRAM(s) Oral daily    MEDICATIONS  (PRN):  acetaminophen    Suspension .. 650 milliGRAM(s) Oral every 6 hours PRN Temp greater or equal to 38C (100.4F)  acetaminophen  Suppository .. 650 milliGRAM(s) Rectal every 4 hours PRN Mild Pain (1 - 3)

## 2020-04-01 NOTE — PROGRESS NOTE ADULT - ASSESSMENT
39 M w/ PMH of Development delay / Nonverbal / from a group home brought in on 3/23 w/ fever - was COVID 19 + -> then dc -> then p/w AHRF 76% on RA -> intubated in ER - CXR shows bilateral opacities    # Acute Hypoxic Respiratory Failure secondary to ARDS from Sepsis due to COVID 19 Pneumonia  - C/w PLaquenil  - increasing fio2 requirements   - cxr worsening   - will try tocilizumab 400mg ivp x 1 today  -  tube feeds   - I spoke with family over phone and updated them on pts status and plan all questions answered     DVTppx  GIppx    FULL CODE

## 2020-04-01 NOTE — PROGRESS NOTE ADULT - SUBJECTIVE AND OBJECTIVE BOX
Over Night Events: none    Remains intubated and sedated, on levo    PHYSICAL EXAM    ICU Vital Signs Last 24 Hrs  T(C): 37.9 (01 Apr 2020 07:05), Max: 39.6 (31 Mar 2020 11:04)  T(F): 100.2 (01 Apr 2020 07:05), Max: 103.3 (31 Mar 2020 11:04)  HR: 72 (01 Apr 2020 07:05) (39 - 168)  BP: 117/77 (01 Apr 2020 07:05) (109/75 - 123/83)  BP(mean): --  ABP: --  ABP(mean): --  RR: 24 (01 Apr 2020 07:05) (18 - 27)  SpO2: 100% (31 Mar 2020 09:34) (100% - 100%)      General: Not in distress  HEENT: TIFFANIE  +ETT           Lungs: Bilateral BS CTA  Cardiovascular: +S1 S2 RRR  Gastrointestinal: Soft, Positive BS  Extremities: No edema    Skin: Warm, intact  Neurological: Sedated, arousable      03-31-20 @ 07:01  -  04-01-20 @ 07:00  --------------------------------------------------------  IN:    dexmedetomidine Infusion: 495 mL    Enteral Tube Flush: 200 mL    midazolam Infusion: 229 mL    norepinephrine Infusion: 286 mL    ns in tub fed  [id number]: 720 mL  Total IN: 1930 mL    OUT:    Indwelling Catheter - Urethral: 1135 mL  Total OUT: 1135 mL    Total NET: 795 mL          LABS:                            15.3   3.50  )-----------( 200      ( 31 Mar 2020 08:36 )             48.1                                               03-31    141  |  104  |  14  ----------------------------<  159<H>  4.7   |  26  |  1.0    Ca    8.2<L>      31 Mar 2020 08:36  Mg     2.0     03-31    TPro  6.1  /  Alb  2.7<L>  /  TBili  1.2  /  DBili  x   /  AST  31  /  ALT  15  /  AlkPhos  68  03-31                                                                                           LIVER FUNCTIONS - ( 31 Mar 2020 08:36 )  Alb: 2.7 g/dL / Pro: 6.1 g/dL / ALK PHOS: 68 U/L / ALT: 15 U/L / AST: 31 U/L / GGT: x                                                                                               Mode: Auto Mode: PRVC/ Volume Support  RR (machine): 18  TV (machine): 430  FiO2: 100  PEEP: 12  MAP: 15  PIP: 24                                      ABG - ( 31 Mar 2020 15:32 )  pH, Arterial: 7.39  pH, Blood: x     /  pCO2: 47    /  pO2: 104   / HCO3: 28    / Base Excess: 2.6   /  SaO2: 98                      Ferritin, Serum: 2414 ng/mL (03-28-20 @ 23:05)      Trend LDH  03-28-20 @ 15:00  473<H>              Procalcitonin, Serum: 0.09 ng/mL (03-28-20 @ 23:05)      CXR read by me:     MEDICATIONS  (STANDING):  chlorhexidine 0.12% Liquid 15 milliLiter(s) Oral Mucosa two times a day  chlorhexidine 4% Liquid 1 Application(s) Topical two times a day  dexMEDEtomidine Infusion 0.2 MICROgram(s)/kG/Hr (4.89 mL/Hr) IV Continuous <Continuous>  enoxaparin Injectable 40 milliGRAM(s) SubCutaneous daily  hydroxychloroquine 200 milliGRAM(s) Oral every 12 hours  hydroxychloroquine   Oral   midazolam Infusion 0.02 mG/kG/Hr (1.96 mL/Hr) IV Continuous <Continuous>  norepinephrine Infusion 0.05 MICROgram(s)/kG/Min (4.22 mL/Hr) IV Continuous <Continuous>  pantoprazole   Suspension 40 milliGRAM(s) Oral daily  zinc sulfate 220 milliGRAM(s) Oral daily    MEDICATIONS  (PRN):  acetaminophen    Suspension .. 650 milliGRAM(s) Oral every 6 hours PRN Temp greater or equal to 38C (100.4F)  acetaminophen  Suppository .. 650 milliGRAM(s) Rectal every 4 hours PRN Mild Pain (1 - 3) Over Night Events: none    Remains intubated and sedated, on levo    PHYSICAL EXAM    ICU Vital Signs Last 24 Hrs  T(C): 37.9 (01 Apr 2020 07:05), Max: 39.6 (31 Mar 2020 11:04)  T(F): 100.2 (01 Apr 2020 07:05), Max: 103.3 (31 Mar 2020 11:04)  HR: 72 (01 Apr 2020 07:05) (39 - 168)  BP: 117/77 (01 Apr 2020 07:05) (109/75 - 123/83)  BP(mean): --  ABP: --  ABP(mean): --  RR: 24 (01 Apr 2020 07:05) (18 - 27)  SpO2: 100% (31 Mar 2020 09:34) (100% - 100%)      General: Not in distress  HEENT: TIFFANIE  +ETT           Lungs: Bilateral BS CTA  Cardiovascular: +S1 S2 RRR  Gastrointestinal: Soft, Positive BS  Extremities: No edema    Skin: Warm, intact  Neurological: Sedated, arousable      03-31-20 @ 07:01  -  04-01-20 @ 07:00  --------------------------------------------------------  IN:    dexmedetomidine Infusion: 495 mL    Enteral Tube Flush: 200 mL    midazolam Infusion: 229 mL    norepinephrine Infusion: 286 mL    ns in tub fed  [id number]: 720 mL  Total IN: 1930 mL    OUT:    Indwelling Catheter - Urethral: 1135 mL  Total OUT: 1135 mL    Total NET: 795 mL          LABS:                            15.3   3.50  )-----------( 200      ( 31 Mar 2020 08:36 )             48.1                                               03-31    141  |  104  |  14  ----------------------------<  159<H>  4.7   |  26  |  1.0    Ca    8.2<L>      31 Mar 2020 08:36  Mg     2.0     03-31    TPro  6.1  /  Alb  2.7<L>  /  TBili  1.2  /  DBili  x   /  AST  31  /  ALT  15  /  AlkPhos  68  03-31                                                                                           LIVER FUNCTIONS - ( 31 Mar 2020 08:36 )  Alb: 2.7 g/dL / Pro: 6.1 g/dL / ALK PHOS: 68 U/L / ALT: 15 U/L / AST: 31 U/L / GGT: x                                                                                               Mode: Auto Mode: PRVC/ Volume Support  RR (machine): 18  TV (machine): 430  FiO2: 100  PEEP: 12  MAP: 15  PIP: 24                                      ABG - ( 31 Mar 2020 15:32 )  pH, Arterial: 7.39  pH, Blood: x     /  pCO2: 47    /  pO2: 104   / HCO3: 28    / Base Excess: 2.6   /  SaO2: 98                      Ferritin, Serum: 2414 ng/mL (03-28-20 @ 23:05)      Trend LDH  03-28-20 @ 15:00  473<H>              Procalcitonin, Serum: 0.09 ng/mL (03-28-20 @ 23:05)      CXR read by me: ett, og and right tlc ok, improved marcelo infiltrates    MEDICATIONS  (STANDING):  chlorhexidine 0.12% Liquid 15 milliLiter(s) Oral Mucosa two times a day  chlorhexidine 4% Liquid 1 Application(s) Topical two times a day  dexMEDEtomidine Infusion 0.2 MICROgram(s)/kG/Hr (4.89 mL/Hr) IV Continuous <Continuous>  enoxaparin Injectable 40 milliGRAM(s) SubCutaneous daily  hydroxychloroquine 200 milliGRAM(s) Oral every 12 hours  hydroxychloroquine   Oral   midazolam Infusion 0.02 mG/kG/Hr (1.96 mL/Hr) IV Continuous <Continuous>  norepinephrine Infusion 0.05 MICROgram(s)/kG/Min (4.22 mL/Hr) IV Continuous <Continuous>  pantoprazole   Suspension 40 milliGRAM(s) Oral daily  zinc sulfate 220 milliGRAM(s) Oral daily    MEDICATIONS  (PRN):  acetaminophen    Suspension .. 650 milliGRAM(s) Oral every 6 hours PRN Temp greater or equal to 38C (100.4F)  acetaminophen  Suppository .. 650 milliGRAM(s) Rectal every 4 hours PRN Mild Pain (1 - 3) Over Night Events: none    Remains intubated and sedated, on levo    PHYSICAL EXAM    ICU Vital Signs Last 24 Hrs  T(C): 37.9 (01 Apr 2020 07:05), Max: 39.6 (31 Mar 2020 11:04)  T(F): 100.2 (01 Apr 2020 07:05), Max: 103.3 (31 Mar 2020 11:04)  HR: 72 (01 Apr 2020 07:05) (39 - 168)  BP: 117/77 (01 Apr 2020 07:05) (109/75 - 123/83)    RR: 24 (01 Apr 2020 07:05) (18 - 27)  SpO2: 100% (31 Mar 2020 09:34) (100% - 100%)      General: Not in distress  HEENT: TIFFANIE  +ETT           Lungs: Bilateral BS CTA  Cardiovascular: +S1 S2 RRR  Gastrointestinal: Soft, Positive BS  Extremities: No edema    Skin: Warm, intact  Neurological: Sedated, arousable      03-31-20 @ 07:01  -  04-01-20 @ 07:00  --------------------------------------------------------  IN:    dexmedetomidine Infusion: 495 mL    Enteral Tube Flush: 200 mL    midazolam Infusion: 229 mL    norepinephrine Infusion: 286 mL    ns in tub fed  [id number]: 720 mL  Total IN: 1930 mL    OUT:    Indwelling Catheter - Urethral: 1135 mL  Total OUT: 1135 mL    Total NET: 795 mL          LABS:                            15.3   3.50  )-----------( 200      ( 31 Mar 2020 08:36 )             48.1                                               03-31    141  |  104  |  14  ----------------------------<  159<H>  4.7   |  26  |  1.0    Ca    8.2<L>      31 Mar 2020 08:36  Mg     2.0     03-31    TPro  6.1  /  Alb  2.7<L>  /  TBili  1.2  /  DBili  x   /  AST  31  /  ALT  15  /  AlkPhos  68  03-31                                                                                           LIVER FUNCTIONS - ( 31 Mar 2020 08:36 )  Alb: 2.7 g/dL / Pro: 6.1 g/dL / ALK PHOS: 68 U/L / ALT: 15 U/L / AST: 31 U/L / GGT: x                                                                                               Mode: Auto Mode: PRVC/ Volume Support  RR (machine): 18  TV (machine): 430  FiO2: 100  PEEP: 12  MAP: 15  PIP: 24                                      ABG - ( 31 Mar 2020 15:32 )  pH, Arterial: 7.39  pH, Blood: x     /  pCO2: 47    /  pO2: 104   / HCO3: 28    / Base Excess: 2.6   /  SaO2: 98                      Ferritin, Serum: 2414 ng/mL (03-28-20 @ 23:05)      Trend LDH  03-28-20 @ 15:00  473<H>              Procalcitonin, Serum: 0.09 ng/mL (03-28-20 @ 23:05)      CXR read by me: ett, og and right tlc ok, improved marcelo infiltrates    MEDICATIONS  (STANDING):  chlorhexidine 0.12% Liquid 15 milliLiter(s) Oral Mucosa two times a day  chlorhexidine 4% Liquid 1 Application(s) Topical two times a day  dexMEDEtomidine Infusion 0.2 MICROgram(s)/kG/Hr (4.89 mL/Hr) IV Continuous <Continuous>  enoxaparin Injectable 40 milliGRAM(s) SubCutaneous daily  hydroxychloroquine 200 milliGRAM(s) Oral every 12 hours  hydroxychloroquine   Oral   midazolam Infusion 0.02 mG/kG/Hr (1.96 mL/Hr) IV Continuous <Continuous>  norepinephrine Infusion 0.05 MICROgram(s)/kG/Min (4.22 mL/Hr) IV Continuous <Continuous>  pantoprazole   Suspension 40 milliGRAM(s) Oral daily  zinc sulfate 220 milliGRAM(s) Oral daily    MEDICATIONS  (PRN):  acetaminophen    Suspension .. 650 milliGRAM(s) Oral every 6 hours PRN Temp greater or equal to 38C (100.4F)  acetaminophen  Suppository .. 650 milliGRAM(s) Rectal every 4 hours PRN Mild Pain (1 - 3)

## 2020-04-01 NOTE — PROGRESS NOTE ADULT - SUBJECTIVE AND OBJECTIVE BOX
EBONY MARIE  39y, Male  Allergy: No Known Allergies      CHIEF COMPLAINT: Acute hypoxemic respiratory failure (01 Apr 2020 09:09)      INTERVAL EVENTS/HPI  - No acute events overnight  - T(F): , Max: 103.1 (03-31-20 @ 17:00)  - Denies any worsening symptoms  - Tolerating medication  -     ROS  unable to obtain history secondary to patient's mental status and/or sedation     FH non-contributory   Social Hx non-contributory    VITALS:  T(F): 100.7, Max: 103.1 (03-31-20 @ 17:00)  HR: 72  BP: 82/54  RR: 29Vital Signs Last 24 Hrs  T(C): 38.2 (01 Apr 2020 11:10), Max: 39.5 (31 Mar 2020 17:00)  T(F): 100.7 (01 Apr 2020 11:10), Max: 103.1 (31 Mar 2020 17:00)  HR: 72 (01 Apr 2020 11:10) (44 - 168)  BP: 82/54 (01 Apr 2020 11:10) (82/54 - 123/83)  BP(mean): --  RR: 29 (01 Apr 2020 11:10) (18 - 29)  SpO2: 98% (01 Apr 2020 10:19) (98% - 98%)    PHYSICAL EXAM:  see below       TESTS & MEASUREMENTS:                        15.3   3.50  )-----------( 200      ( 31 Mar 2020 08:36 )             48.1     03-31    141  |  104  |  14  ----------------------------<  159<H>  4.7   |  26  |  1.0    Ca    8.2<L>      31 Mar 2020 08:36  Mg     2.0     03-31    TPro  6.1  /  Alb  2.7<L>  /  TBili  1.2  /  DBili  x   /  AST  31  /  ALT  15  /  AlkPhos  68  03-31      LIVER FUNCTIONS - ( 31 Mar 2020 08:36 )  Alb: 2.7 g/dL / Pro: 6.1 g/dL / ALK PHOS: 68 U/L / ALT: 15 U/L / AST: 31 U/L / GGT: x               Culture - Urine (collected 03-28-20 @ 16:00)  Source: .Urine Clean Catch (Midstream)  Final Report (03-29-20 @ 20:18):    No growth    Culture - Blood (collected 03-28-20 @ 15:00)  Source: .Blood Blood-Peripheral  Preliminary Report (03-29-20 @ 23:02):    No growth to date.    Culture - Blood (collected 03-28-20 @ 15:00)  Source: .Blood Blood-Peripheral  Preliminary Report (03-29-20 @ 23:02):    No growth to date.        Lactate, Blood: 1.4 mmol/L (03-28-20 @ 15:00)      INFECTIOUS DISEASES TESTING      RADIOLOGY & ADDITIONAL TESTS:  I have personally reviewed the last Chest xray  CXR  Xray Chest 1 View- PORTABLE-Urgent:   EXAM:  XR CHEST PORTABLE URGENT 1V            PROCEDURE DATE:  03/31/2020            INTERPRETATION:  Clinical History / Reason for exam: Hypoxic respiratory failure, central line placement    Comparison : Chest radiograph 3/31/2020.    Technique/Positioning: AP portable    Findings:    Support devices: Enteric tube tip below left hemidiaphragm. Right internal jugular venous catheter tip overlies the cavoatrial junction..    Cardiac/mediastinum/hilum: Unchanged.    Lung parenchyma/Pleura: Bilateral left greater than right unchanged interstitial opacities present. No pneumothorax or pleural effusion. Skeleton/soft tissues: Stable.    Impression:        1. Stable support lines and tubes  2. Bilateral, left greater than right interstitial opacities consistent with pneumonia in the appropriate clinical setting                      QASIM YIN M.D., ATTENDING RADIOLOGIST  This document has been electronically signed. Mar 31 2020  2:34PM             (03-31-20 @ 14:20)      CT      CARDIOLOGY TESTING      MEDICATIONS  chlorhexidine 0.12% Liquid 15  chlorhexidine 4% Liquid 1  dexMEDEtomidine Infusion 0.2  enoxaparin Injectable 40  hydroxychloroquine 200  hydroxychloroquine   midazolam Infusion 0.02  norepinephrine Infusion 0.05  pantoprazole   Suspension 40  zinc sulfate 220      ANTIBIOTICS:  hydroxychloroquine 200 milliGRAM(s) Oral every 12 hours  hydroxychloroquine   Oral       All available historical data has been reviewed

## 2020-04-01 NOTE — PROGRESS NOTE ADULT - ASSESSMENT
IMPRESSION:    Acute hypoxic respiratory failure improving CXR, significant A-a gradiant  Septic shock  R/O VTE  COVID PNA doubt superinfection  HO Developmental delay from group home    PLAN:    CNS: morphine/versed    HEENT: oral care     PULMONARY: HOB 45 degrees, advance ett 2 cm, vent changes:    CARDIOVASCULAR: keep IS =OS, wean levophed,    GI: GI prophylaxis. OG Feeding     RENAL: follow lytes IS and OS     INFECTIOUS DISEASE: HCQ, daily qtc, tracheal aspirate, PCT 0.09, monitor CRP ferritin and LDH    HEMATOLOGICAL:  DVT prophylaxis.    ENDOCRINE:  Follow up FS.  Insulin protocol if needed.    MUSCULOSKELETAL: Bedrest, LE venous duplex    Keep torres and TLC    ICU monitoring for now. IMPRESSION:    Acute hypoxic respiratory failure improving CXR, significant A-a gradiant  Persistent fevers possible CRS elevated inflammatory markers  Septic shock  R/O VTE  COVID PNA doubt superinfection  HO Developmental delay from group home    PLAN:    CNS: morphine/versed    HEENT: oral care     PULMONARY: HOB 45 degrees, advance ett 2 cm, vent changes:    CARDIOVASCULAR: keep IS =OS, wean levophed,    GI: GI prophylaxis. OG Feeding     RENAL: follow lytes IS and OS     INFECTIOUS DISEASE: HCQ, daily qtc, tracheal aspirate, PCT 0.09, monitor CRP ferritin and LDH    HEMATOLOGICAL:  DVT prophylaxis. May need toci, f/up ID    ENDOCRINE:  Follow up FS.  Insulin protocol if needed.    MUSCULOSKELETAL: Bedrest, LE venous duplex    Keep torres and TLC    ICU monitoring for now. IMPRESSION:    Acute hypoxic respiratory failure improving CXR, significant A-a gradiant  Persistent fevers possible CRS elevated inflammatory markers  Septic shock  R/O VTE  COVID PNA doubt superinfection  HO Developmental delay from group home    PLAN:    CNS: morphine/versed    HEENT: oral care     PULMONARY: HOB 45 degrees, advance ett 2 cm, vent changes:  needs CXR today    CARDIOVASCULAR: keep IS =OS, wean levophed,    GI: GI prophylaxis. OG Feeding     RENAL: follow lytes IS and OS     INFECTIOUS DISEASE: HCQ, daily qtc, tracheal aspirate, PCT 0.09, monitor CRP ferritin and LDH    HEMATOLOGICAL:  DVT prophylaxis. May need toci, f/up ID    ENDOCRINE:  Follow up FS.  Insulin protocol if needed.    MUSCULOSKELETAL: Bedrest, LE venous duplex    Keep torres and TLC    ICU monitoring for now. IMPRESSION:    Acute hypoxic respiratory failure improving CXR, significant A-a gradiant  Persistent fevers possible CRS elevated inflammatory markers  Septic shock  R/O VTE  COVID PNA doubt superinfection  HO Developmental delay from group home    PLAN:    CNS: morphine/versed    HEENT: oral care     PULMONARY: HOB 45 degrees, advance ett 2 cm, vent changes: none  needs CXR today    CARDIOVASCULAR: keep IS =OS, wean levophed,    GI: GI prophylaxis. OG Feeding     RENAL: follow lytes IS and OS     INFECTIOUS DISEASE: HCQ, daily qtc, tracheal aspirate, PCT 0.09, monitor CRP ferritin and LDH    HEMATOLOGICAL:  DVT prophylaxis. May need toci, f/up ID    ENDOCRINE:  Follow up FS.  Insulin protocol if needed.    MUSCULOSKELETAL: Bedrest, LE venous duplex    Keep torres and TLC    ICU monitoring for now. IMPRESSION:    Acute hypoxic respiratory failure , significant A-a gradiant  Persistent fevers possible CRS elevated inflammatory markers  Septic shock  R/O VTE  COVID PNA doubt superinfection  HO Developmental delay from group home    PLAN:    CNS: morphine/versed    HEENT: oral care     PULMONARY: HOB 45 degrees, advance ett 2 cm, vent changes: none  needs CXR today    CARDIOVASCULAR: keep IS =OS, wean levophed,    GI: GI prophylaxis. OG Feeding     RENAL: follow lytes IS and OS     INFECTIOUS DISEASE: HCQ, daily qtc, tracheal aspirate, PCT 0.09, monitor CRP ferritin and LDH    HEMATOLOGICAL:  DVT prophylaxis. May need toci, f/up ID    ENDOCRINE:  Follow up FS.  Insulin protocol if needed.    MUSCULOSKELETAL: Bedrest, LE venous duplex    Keep torres and TLC    ICU monitoring for now.

## 2020-04-01 NOTE — PROGRESS NOTE ADULT - ASSESSMENT
39y old  Male with PMHx of developmental delay (nonverbal at baseline), HLD who presents from [facility] for sob. Per chart review, pt was seen in the ED on 3/23 for fever and decreased po intake. Labs at that time were notable for WBC 2, AST/ALT 46/71, negative blood cx x2, cxr with bilateral opacities. Pt tolerated po and was d/c'ed back to [facility]. Pt returned on 3/24 with fever, cough, and vomiting episodes after coughing. UA/urine cx negative, CT abd negative for acute abd pathology but showed RLL ground glass opacity. Pt was d/c'ed back to [facility] and instructed to quarantine for possible COVID. Now returned to ER today for worsening sob over past several days. In the ER, He is febrile, tachycardic and tachypneic 30s-40s, satting 76% on RA. SpO2 improved to 91% on NRB but pt still tachypneic and not tolerating NRB well, so was intubated and started on precedex. Labs notable for WBC 2, AST 42, , trop <0.01. UA negative. Cxr with increased bilateral infiltrates. Pending COVID results.       1. Sepsis with SIRS due to COVID 19 Pneumonia  Tmax 103.6  Procalcitonin 0.09  wbc dropping (leukopenic)  4/1 Cxray with Left base opacity partially obscuring the left hemidiaphragm, worse compared to prior examination. Right base opacity and other scattered opacities.  Tmax 103.1    2. Acute Hypoxic Respiratory failure    PLAN  Complete 8 total doses of hydroxychloroquine 200mg q12h PO     and Monitor QTc;    Tocilizumab 400mg x 1 dose (approved)

## 2020-04-02 DIAGNOSIS — J12.9 VIRAL PNEUMONIA, UNSPECIFIED: ICD-10-CM

## 2020-04-02 LAB
ANION GAP SERPL CALC-SCNC: 10 MMOL/L — SIGNIFICANT CHANGE UP (ref 7–14)
APTT BLD: 140.9 SEC — CRITICAL HIGH (ref 27–39.2)
BASE EXCESS BLDA CALC-SCNC: 2.7 MMOL/L — HIGH (ref -2–2)
BASE EXCESS BLDA CALC-SCNC: 3.7 MMOL/L — HIGH (ref -2–2)
BUN SERPL-MCNC: 19 MG/DL — SIGNIFICANT CHANGE UP (ref 10–20)
CALCIUM SERPL-MCNC: 7.8 MG/DL — LOW (ref 8.5–10.1)
CHLORIDE SERPL-SCNC: 107 MMOL/L — SIGNIFICANT CHANGE UP (ref 98–110)
CO2 SERPL-SCNC: 28 MMOL/L — SIGNIFICANT CHANGE UP (ref 17–32)
CREAT SERPL-MCNC: 0.7 MG/DL — SIGNIFICANT CHANGE UP (ref 0.7–1.5)
GLUCOSE SERPL-MCNC: 140 MG/DL — HIGH (ref 70–99)
HCO3 BLDA-SCNC: 29 MMOL/L — HIGH (ref 23–27)
HCO3 BLDA-SCNC: 30 MMOL/L — HIGH (ref 23–27)
HCT VFR BLD CALC: 43.5 % — SIGNIFICANT CHANGE UP (ref 42–52)
HGB BLD-MCNC: 13.2 G/DL — LOW (ref 14–18)
HOROWITZ INDEX BLDA+IHG-RTO: 100 — SIGNIFICANT CHANGE UP
HOROWITZ INDEX BLDA+IHG-RTO: 90 — SIGNIFICANT CHANGE UP
INR BLD: 2.49 RATIO — HIGH (ref 0.65–1.3)
MCHC RBC-ENTMCNC: 28.6 PG — SIGNIFICANT CHANGE UP (ref 27–31)
MCHC RBC-ENTMCNC: 30.3 G/DL — LOW (ref 32–37)
MCV RBC AUTO: 94.4 FL — HIGH (ref 80–94)
NRBC # BLD: 0 /100 WBCS — SIGNIFICANT CHANGE UP (ref 0–0)
PCO2 BLDA: 48 MMHG — HIGH (ref 38–42)
PCO2 BLDA: 53 MMHG — HIGH (ref 38–42)
PH BLDA: 7.36 — LOW (ref 7.38–7.42)
PH BLDA: 7.4 — SIGNIFICANT CHANGE UP (ref 7.38–7.42)
PLATELET # BLD AUTO: 220 K/UL — SIGNIFICANT CHANGE UP (ref 130–400)
PO2 BLDA: 119 MMHG — HIGH (ref 78–95)
PO2 BLDA: 165 MMHG — HIGH (ref 78–95)
POTASSIUM SERPL-MCNC: 5.2 MMOL/L — HIGH (ref 3.5–5)
POTASSIUM SERPL-SCNC: 5.2 MMOL/L — HIGH (ref 3.5–5)
PROTHROM AB SERPL-ACNC: 28.6 SEC — HIGH (ref 9.95–12.87)
RBC # BLD: 4.61 M/UL — LOW (ref 4.7–6.1)
RBC # FLD: 17.3 % — HIGH (ref 11.5–14.5)
SAO2 % BLDA: 99 % — HIGH (ref 94–98)
SAO2 % BLDA: 99 % — HIGH (ref 94–98)
SODIUM SERPL-SCNC: 145 MMOL/L — SIGNIFICANT CHANGE UP (ref 135–146)
WBC # BLD: 4 K/UL — LOW (ref 4.8–10.8)
WBC # FLD AUTO: 4 K/UL — LOW (ref 4.8–10.8)

## 2020-04-02 PROCEDURE — 99233 SBSQ HOSP IP/OBS HIGH 50: CPT

## 2020-04-02 PROCEDURE — 71045 X-RAY EXAM CHEST 1 VIEW: CPT | Mod: 26

## 2020-04-02 PROCEDURE — 99233 SBSQ HOSP IP/OBS HIGH 50: CPT | Mod: GC

## 2020-04-02 RX ORDER — HEPARIN SODIUM 5000 [USP'U]/ML
1500 INJECTION INTRAVENOUS; SUBCUTANEOUS
Qty: 25000 | Refills: 0 | Status: DISCONTINUED | OUTPATIENT
Start: 2020-04-02 | End: 2020-04-03

## 2020-04-02 RX ORDER — HEPARIN SODIUM 5000 [USP'U]/ML
1700 INJECTION INTRAVENOUS; SUBCUTANEOUS
Qty: 25000 | Refills: 0 | Status: DISCONTINUED | OUTPATIENT
Start: 2020-04-02 | End: 2020-04-02

## 2020-04-02 RX ORDER — HEPARIN SODIUM 5000 [USP'U]/ML
8000 INJECTION INTRAVENOUS; SUBCUTANEOUS ONCE
Refills: 0 | Status: COMPLETED | OUTPATIENT
Start: 2020-04-02 | End: 2020-04-02

## 2020-04-02 RX ADMIN — HEPARIN SODIUM 15 UNIT(S)/HR: 5000 INJECTION INTRAVENOUS; SUBCUTANEOUS at 21:31

## 2020-04-02 RX ADMIN — CHLORHEXIDINE GLUCONATE 15 MILLILITER(S): 213 SOLUTION TOPICAL at 05:11

## 2020-04-02 RX ADMIN — CHLORHEXIDINE GLUCONATE 1 APPLICATION(S): 213 SOLUTION TOPICAL at 17:40

## 2020-04-02 RX ADMIN — MIDAZOLAM HYDROCHLORIDE 1.96 MG/KG/HR: 1 INJECTION, SOLUTION INTRAMUSCULAR; INTRAVENOUS at 13:34

## 2020-04-02 RX ADMIN — PANTOPRAZOLE SODIUM 40 MILLIGRAM(S): 20 TABLET, DELAYED RELEASE ORAL at 11:31

## 2020-04-02 RX ADMIN — HEPARIN SODIUM 1700 UNIT(S)/HR: 5000 INJECTION INTRAVENOUS; SUBCUTANEOUS at 10:59

## 2020-04-02 RX ADMIN — Medication 650 MILLIGRAM(S): at 08:30

## 2020-04-02 RX ADMIN — Medication 4.22 MICROGRAM(S)/KG/MIN: at 14:02

## 2020-04-02 RX ADMIN — DEXMEDETOMIDINE HYDROCHLORIDE IN 0.9% SODIUM CHLORIDE 4.89 MICROGRAM(S)/KG/HR: 4 INJECTION INTRAVENOUS at 09:30

## 2020-04-02 RX ADMIN — CHLORHEXIDINE GLUCONATE 15 MILLILITER(S): 213 SOLUTION TOPICAL at 17:40

## 2020-04-02 RX ADMIN — CHLORHEXIDINE GLUCONATE 1 APPLICATION(S): 213 SOLUTION TOPICAL at 05:11

## 2020-04-02 RX ADMIN — Medication 4.22 MICROGRAM(S)/KG/MIN: at 02:25

## 2020-04-02 RX ADMIN — Medication 650 MILLIGRAM(S): at 02:24

## 2020-04-02 RX ADMIN — Medication 200 MILLIGRAM(S): at 05:11

## 2020-04-02 RX ADMIN — HEPARIN SODIUM 8000 UNIT(S): 5000 INJECTION INTRAVENOUS; SUBCUTANEOUS at 10:58

## 2020-04-02 RX ADMIN — DEXMEDETOMIDINE HYDROCHLORIDE IN 0.9% SODIUM CHLORIDE 4.89 MICROGRAM(S)/KG/HR: 4 INJECTION INTRAVENOUS at 14:44

## 2020-04-02 RX ADMIN — DEXMEDETOMIDINE HYDROCHLORIDE IN 0.9% SODIUM CHLORIDE 4.89 MICROGRAM(S)/KG/HR: 4 INJECTION INTRAVENOUS at 21:31

## 2020-04-02 NOTE — PROGRESS NOTE ADULT - ASSESSMENT
39 M w/ PMH of Development delay / Nonverbal / from a group home brought in on 3/23 w/ fever - was COVID 19 + -> then dc -> then p/w AHRF 76% on RA -> intubated in ER - CXR shows bilateral opacities    # Acute Hypoxic Respiratory Failure secondary to ARDS from Sepsis due to COVID 19 Pneumonia     - cxr mildly improved    - start IV heparin to treat possible embolic event causing hypoxemia  - C/w PLaquenil  - high fio2 requirements   - check labs today   - received  tocilizumab 400mg   -  tube feeds   - I spoke with pts brother  over phone and updated him on pts status and plan all questions answered     DVTppx  GIppx    FULL CODE

## 2020-04-02 NOTE — PROGRESS NOTE ADULT - SUBJECTIVE AND OBJECTIVE BOX
Over Night Events: none    Remains intubated and sedated, s/p toci yesterday    PHYSICAL EXAM    ICU Vital Signs Last 24 Hrs  T(C): 39.7 (02 Apr 2020 08:00), Max: 39.7 (02 Apr 2020 08:00)  T(F): 103.4 (02 Apr 2020 08:00), Max: 103.4 (02 Apr 2020 08:00)  HR: 68 (02 Apr 2020 08:33) (48 - 114)  BP: 107/65 (02 Apr 2020 08:00) (72/46 - 161/84)  BP(mean): 90 (02 Apr 2020 05:00) (84 - 90)  ABP: --  ABP(mean): --  RR: 18 (02 Apr 2020 08:00) (18 - 31)  SpO2: 100% (02 Apr 2020 08:33) (96% - 100%)      General: Not in distress  HEENT: TIFFANIE  +ETT           Lungs: Bilateral BS CTA  Cardiovascular: +S1 S2 RRR  Gastrointestinal: Soft, Positive BS  Extremities: No edema    Skin: Warm, intact  Neurological: Sedated      04-01-20 @ 07:01  -  04-02-20 @ 07:00  --------------------------------------------------------  IN:    dexmedetomidine Infusion: 22.5 mL    dexmedetomidine Infusion: 269.5 mL    Enteral Tube Flush: 60 mL    midazolam Infusion: 108 mL    norepinephrine Infusion: 454 mL    ns in tub fed  [id number]: 600 mL    Sodium Chloride 0.9% IV Bolus: 500 mL    vasopressin Infusion: 3.6 mL  Total IN: 2017.6 mL    OUT:    Indwelling Catheter - Urethral: 775 mL  Total OUT: 775 mL    Total NET: 1242.6 mL      04-02-20 @ 07:01  -  04-02-20 @ 10:07  --------------------------------------------------------  IN:    dexmedetomidine Infusion: 39 mL    midazolam Infusion: 12 mL    norepinephrine Infusion: 57 mL  Total IN: 108 mL    OUT:  Total OUT: 0 mL    Total NET: 108 mL          LABS:                                                                                                                                                                                                                                     Mode: Auto Mode: PRVC/ Volume Support  RR (machine): 17  TV (machine): 450  FiO2: 100  PEEP: 12  MAP: 17  PIP: 25                                      ABG - ( 02 Apr 2020 06:09 )  pH, Arterial: 7.36  pH, Blood: x     /  pCO2: 53    /  pO2: 119   / HCO3: 29    / Base Excess: 2.7   /  SaO2: 99                      Ferritin, Serum: 2414 ng/mL (03-28-20 @ 23:05)      Trend LDH  03-28-20 @ 15:00  473<H>              Procalcitonin, Serum: 0.09 ng/mL (03-28-20 @ 23:05)      CXR read by me: worsening left side fiiltrate, tlc ett and og ok    MEDICATIONS  (STANDING):  chlorhexidine 0.12% Liquid 15 milliLiter(s) Oral Mucosa two times a day  chlorhexidine 4% Liquid 1 Application(s) Topical two times a day  dexMEDEtomidine Infusion 0.2 MICROgram(s)/kG/Hr (4.89 mL/Hr) IV Continuous <Continuous>  enoxaparin Injectable 40 milliGRAM(s) SubCutaneous daily  midazolam Infusion 0.02 mG/kG/Hr (1.96 mL/Hr) IV Continuous <Continuous>  norepinephrine Infusion 0.05 MICROgram(s)/kG/Min (4.22 mL/Hr) IV Continuous <Continuous>  pantoprazole   Suspension 40 milliGRAM(s) Oral daily  vasopressin Infusion 0.01 Unit(s)/Min (0.6 mL/Hr) IV Continuous <Continuous>  zinc sulfate 220 milliGRAM(s) Oral daily    MEDICATIONS  (PRN):  acetaminophen    Suspension .. 650 milliGRAM(s) Oral every 6 hours PRN Temp greater or equal to 38C (100.4F)  acetaminophen  Suppository .. 650 milliGRAM(s) Rectal every 4 hours PRN Mild Pain (1 - 3) Over Night Events: none    Remains intubated and sedated, s/p toci yesterday    PHYSICAL EXAM    ICU Vital Signs Last 24 Hrs  T(C): 39.7 (02 Apr 2020 08:00), Max: 39.7 (02 Apr 2020 08:00)  T(F): 103.4 (02 Apr 2020 08:00), Max: 103.4 (02 Apr 2020 08:00)  HR: 68 (02 Apr 2020 08:33) (48 - 114)  BP: 107/65 (02 Apr 2020 08:00) (72/46 - 161/84)  BP(mean): 90 (02 Apr 2020 05:00) (84 - 90)  RR: 18 (02 Apr 2020 08:00) (18 - 31)  SpO2: 100% (02 Apr 2020 08:33) (96% - 100%)      General: Not in distress  HEENT: TFIFANIE  +ETT           Lungs: Bilateral BS CTA  Cardiovascular: +S1 S2 RRR  Gastrointestinal: Soft, Positive BS  Extremities: No edema    Skin: Warm, intact  Neurological: Sedated      04-01-20 @ 07:01  -  04-02-20 @ 07:00  --------------------------------------------------------  IN:    dexmedetomidine Infusion: 22.5 mL    dexmedetomidine Infusion: 269.5 mL    Enteral Tube Flush: 60 mL    midazolam Infusion: 108 mL    norepinephrine Infusion: 454 mL    ns in tub fed  [id number]: 600 mL    Sodium Chloride 0.9% IV Bolus: 500 mL    vasopressin Infusion: 3.6 mL  Total IN: 2017.6 mL    OUT:    Indwelling Catheter - Urethral: 775 mL  Total OUT: 775 mL    Total NET: 1242.6 mL      04-02-20 @ 07:01  -  04-02-20 @ 10:07  --------------------------------------------------------  IN:    dexmedetomidine Infusion: 39 mL    midazolam Infusion: 12 mL    norepinephrine Infusion: 57 mL  Total IN: 108 mL    OUT:  Total OUT: 0 mL    Total NET: 108 mL          LABS:                                             Mode: Auto Mode: PRVC/ Volume Support  RR (machine): 17  TV (machine): 450  FiO2: 100  PEEP: 12  MAP: 17  PIP: 25                                      ABG - ( 02 Apr 2020 06:09 )  pH, Arterial: 7.36  pH, Blood: x     /  pCO2: 53    /  pO2: 119   / HCO3: 29    / Base Excess: 2.7   /  SaO2: 99              Ferritin, Serum: 2414 ng/mL (03-28-20 @ 23:05)      Trend LDH  03-28-20 @ 15:00  473<H>    Procalcitonin, Serum: 0.09 ng/mL (03-28-20 @ 23:05)      CXR read by me: worsening left side fiiltrate, tlc ett and og ok    MEDICATIONS  (STANDING):  chlorhexidine 0.12% Liquid 15 milliLiter(s) Oral Mucosa two times a day  chlorhexidine 4% Liquid 1 Application(s) Topical two times a day  dexMEDEtomidine Infusion 0.2 MICROgram(s)/kG/Hr (4.89 mL/Hr) IV Continuous <Continuous>  enoxaparin Injectable 40 milliGRAM(s) SubCutaneous daily  midazolam Infusion 0.02 mG/kG/Hr (1.96 mL/Hr) IV Continuous <Continuous>  norepinephrine Infusion 0.05 MICROgram(s)/kG/Min (4.22 mL/Hr) IV Continuous <Continuous>  pantoprazole   Suspension 40 milliGRAM(s) Oral daily  vasopressin Infusion 0.01 Unit(s)/Min (0.6 mL/Hr) IV Continuous <Continuous>  zinc sulfate 220 milliGRAM(s) Oral daily    MEDICATIONS  (PRN):  acetaminophen    Suspension .. 650 milliGRAM(s) Oral every 6 hours PRN Temp greater or equal to 38C (100.4F)  acetaminophen  Suppository .. 650 milliGRAM(s) Rectal every 4 hours PRN Mild Pain (1 - 3)

## 2020-04-02 NOTE — CHART NOTE - NSCHARTNOTEFT_GEN_A_CORE
Registered Dietitian Follow-Up     Patient Profile Reviewed                           Yes [x]   No []     Nutrition History Previously Obtained        Yes []  No [x]      Pertinent Medical Interventions: p/w acute hypoxemic respiratory failure. Acute Hypoxic Respiratory Failure 2/2 ARDS from Sepsis d/t COVID 19 Pneumonia noted. s/p toci yesterday noted. Tmax 24 hours 39.7 C. Latest /68 mmHg, mean      Diet order: Peptamen AF at 360 mL q6hrs ordered 4/1. Regimen at goal provides 1728 kcal, 108 g protein, 1166 mL free H2O.     Anthropometrics:  - Ht. 157.5 cm.  - Wt. 98.7 kg (4/2) vs. previous wt 97.8 kg (3/28, dosing wt)  - BMI 39.8 using current wt 98.7 kg  - IBW 53.6 kg.     Pertinent Lab Data: 4/2: RBC-4.61, Hgb-13.2, K-5.2, gluc-140; 3/31: Mg-2.0 (WDL), PO4-2.3 (WDL)     Pertinent Meds: heparin, vasopressin at 0.6 mL/hr, ZnSO4, levophed at 4.22 mL/hr, protonix     Physical Findings:  - Appearance: intubated, 1+ generalized edema  - GI function: last BM 4/1  - Tubes: OG tube  - Oral/Mouth cavity: NPO  - Skin: intact     Nutrition Requirements  Weight Used: 97.8 kg ABW vs. 53.6 kg IBW; needs estimated per 3/30 RD assessment     Estimated Energy Needs    Continue [x]  Adjust []  [id number] obtained by comparing  8364-1803 kcal (60-70%2405 kcal PSU 2003b) vs 1320-2893 kcal (11-14 kcal/kg CBW) vs 7573-2485 (22-25 kcal/kg IBW)        Estimated Protein Needs    Continue [x]  Adjust []   g (1.5-2 g/kg IBW)        Estimated Fluid Needs        Continue [x]  Adjust []  per LIP     Nutrient Intake: Current EN regimen at goal provides 115% kcal & 103% protein needs at goal.        [x] Previous Nutrition Diagnosis: Excessive energy intake            [x] Ongoing          [] Resolved     Nutrition Intervention: Enteral Nutrition      Goal/Expected Outcome: Pt to meet >85% & <105% of estimated nutrient needs over next 4 days.     Indicator/Monitoring: Energy intake, glucose profile, renal profile, nutrition focused physical findings, body compositoin.    Recommendation: Change EN formula to Osmolite 1.5 continuous infusions at goal rate 35 mL/hr + order 2 packets prosource TF q12hrs (total 4 packets/day). Regimen at goal to provide 1420 kcal, 97 g protein, 1528 mg K+, 638 mL free H2O. Flushes per LIP. Reviewed with LIP on unit.

## 2020-04-02 NOTE — PROGRESS NOTE ADULT - ASSESSMENT
IMPRESSION:    Acute hypoxic respiratory failure  Possible CRS s/p Toci  Septic shock  COVID PNA s/p plaquenil   Possible superinfection worsening left side ifniltrate  HO Developmental delay from group home    PLAN:    CNS: morphine/versed, DC precedex    HEENT: oral care     PULMONARY: HOB 45 degrees, vent changes: none    CARDIOVASCULAR: keep IS =OS, wean levophed    GI: GI prophylaxis. OG Feeding     RENAL: follow lytes IS and OS, BMP today    INFECTIOUS DISEASE: daily qtc, deep tracheal aspirate, repeat procalcitonin, start cefepime, repeat bcx    HEMATOLOGICAL:  DVT prophylaxis. May need toci, f/up ID, CBC today    ENDOCRINE:  Follow up FS.  Insulin protocol if needed.    MUSCULOSKELETAL: Bedrest, LE venous duplex negative    Keep torres and TLC    ICU monitoring for now. IMPRESSION:    Acute hypoxic respiratory failure  Possible CRS s/p Toci  Septic shock  COVID PNA s/p plaquenil   Possible superinfection worsening left side ifniltrate  HO Developmental delay from group home    PLAN:    CNS: morphine/versed, DC precedex    HEENT: oral care     PULMONARY: HOB 45 degrees, vent changes: taper O2    CARDIOVASCULAR: keep IS =OS, wean levophed    GI: GI prophylaxis. OG Feeding     RENAL: follow lytes IS and OS, BMP today    INFECTIOUS DISEASE: daily qtc, deep tracheal aspirate, repeat procalcitonin, start cefepime, repeat bcx    HEMATOLOGICAL:  DVT prophylaxis. May need toci, f/up ID, CBC today    ENDOCRINE:  Follow up FS.  Insulin protocol if needed.    MUSCULOSKELETAL: Bedrest, LE venous duplex negative    Keep torres and TLC    ICU monitoring for now. IMPRESSION:    Acute hypoxic respiratory failure  Rule out VTE  Possible CRS s/p Toci  Septic shock  COVID PNA s/p plaquenil   Possible superinfection worsening left side ifniltrate  HO Developmental delay from group home    PLAN:    CNS: morphine/versed, DC precedex    HEENT: oral care     PULMONARY: HOB 45 degrees, vent changes: taper O2    CARDIOVASCULAR: keep IS =OS, wean levophed    GI: GI prophylaxis. OG Feeding     RENAL: follow lytes IS and OS, BMP today    INFECTIOUS DISEASE: daily qtc, deep tracheal aspirate, repeat procalcitonin, start cefepime, repeat bcx    HEMATOLOGICAL:  DVT prophylaxis. f/up ID, CBC today    ENDOCRINE:  Follow up FS.  Insulin protocol if needed.    MUSCULOSKELETAL: Bedrest, LE venous duplex negative    Keep torres and TLC    ICU monitoring for now. IMPRESSION:    Acute hypoxic respiratory failure  Rule out VTE  Possible CRS s/p Toci  Septic shock  COVID PNA s/p plaquenil   Possible superinfection worsening left side ifniltrate  HO Developmental delay from group home    PLAN:    CNS: morphine/versed, DC precedex    HEENT: oral care     PULMONARY: HOB 45 degrees, vent changes: taper O2    CARDIOVASCULAR: keep IS =OS, wean levophed    GI: GI prophylaxis. OG Feeding     RENAL: follow lytes IS and OS, BMP today    INFECTIOUS DISEASE: abx per ID    HEMATOLOGICAL:  DVT prophylaxis. f/up ID, CBC today    ENDOCRINE:  Follow up FS.  Insulin protocol if needed.    MUSCULOSKELETAL: Bedrest, LE venous duplex negative    Keep torres and TLC    ICU monitoring for now.

## 2020-04-02 NOTE — PROGRESS NOTE ADULT - SUBJECTIVE AND OBJECTIVE BOX
EBONY MARIE  39y, Male  Allergy: No Known Allergies      CHIEF COMPLAINT: Acute hypoxemic respiratory failure (01 Apr 2020 13:23)      INTERVAL EVENTS/HPI  - No acute events overnight  - T(F): , Max: 102.3 (04-02-20 @ 02:00)      ROS  UTO     SOCIAL HISTORY  Social History:  Unable to obtain as pt is intubated. (28 Mar 2020 17:00)        FH noncontributory     VITALS:  T(F): 101.1, Max: 102.3 (04-02-20 @ 02:00)  HR: 99  BP: 108/73  RR: 25Vital Signs Last 24 Hrs  T(C): 38.4 (02 Apr 2020 06:00), Max: 39.1 (02 Apr 2020 02:00)  T(F): 101.1 (02 Apr 2020 06:00), Max: 102.3 (02 Apr 2020 02:00)  HR: 99 (02 Apr 2020 06:00) (48 - 114)  BP: 108/73 (02 Apr 2020 06:00) (72/46 - 161/84)  BP(mean): 90 (02 Apr 2020 05:00) (84 - 90)  RR: 25 (02 Apr 2020 06:00) (24 - 31)  SpO2: 97% (02 Apr 2020 06:00) (96% - 99%)    PHYSICAL EXAM:  Gen: vented  HEENT: Normocephalic, atraumatic  Neck: supple, no lymphadenopathy  CV: s1s 2+   Lungs: decreased BS   Abdomen: Soft, BS present  Ext: Warm, well perfused  Neuro: non focal  Skin: no rash, no erythema      TESTS & MEASUREMENTS:                        15.3   3.50  )-----------( 200      ( 31 Mar 2020 08:36 )             48.1     03-31    141  |  104  |  14  ----------------------------<  159<H>  4.7   |  26  |  1.0    Ca    8.2<L>      31 Mar 2020 08:36  Mg     2.0     03-31    TPro  6.1  /  Alb  2.7<L>  /  TBili  1.2  /  DBili  x   /  AST  31  /  ALT  15  /  AlkPhos  68  03-31      LIVER FUNCTIONS - ( 31 Mar 2020 08:36 )  Alb: 2.7 g/dL / Pro: 6.1 g/dL / ALK PHOS: 68 U/L / ALT: 15 U/L / AST: 31 U/L / GGT: x               Culture - Urine (collected 03-28-20 @ 16:00)  Source: .Urine Clean Catch (Midstream)  Final Report (03-29-20 @ 20:18):    No growth    Culture - Blood (collected 03-28-20 @ 15:00)  Source: .Blood Blood-Peripheral  Final Report (04-01-20 @ 23:00):    No Growth Final    Culture - Blood (collected 03-28-20 @ 15:00)  Source: .Blood Blood-Peripheral  Final Report (04-01-20 @ 23:00):    No Growth Final        Lactate, Blood: 1.4 mmol/L (03-28-20 @ 15:00)      INFECTIOUS DISEASES TESTING      RADIOLOGY & ADDITIONAL TESTS:  I have personally reviewed the last Chest xray  CXR  Xray Chest 1 View- PORTABLE-Urgent:   EXAM:  XR CHEST PORTABLE URGENT 1V            PROCEDURE DATE:  03/31/2020            INTERPRETATION:  Clinical History / Reason for exam: Hypoxic respiratory failure, central line placement    Comparison : Chest radiograph 3/31/2020.    Technique/Positioning: AP portable    Findings:    Support devices: Enteric tube tip below left hemidiaphragm. Right internal jugular venous catheter tip overlies the cavoatrial junction..    Cardiac/mediastinum/hilum: Unchanged.    Lung parenchyma/Pleura: Bilateral left greater than right unchanged interstitial opacities present. No pneumothorax or pleural effusion. Skeleton/soft tissues: Stable.    Impression:        1. Stable support lines and tubes  2. Bilateral, left greater than right interstitial opacities consistent with pneumonia in the appropriate clinical setting                      QASIM YIN M.D., ATTENDING RADIOLOGIST  This document has been electronically signed. Mar 31 2020  2:34PM             (03-31-20 @ 14:20)      CT      CARDIOLOGY TESTING      MEDICATIONS  chlorhexidine 0.12% Liquid 15  chlorhexidine 4% Liquid 1  dexMEDEtomidine Infusion 0.2  enoxaparin Injectable 40  midazolam Infusion 0.02  norepinephrine Infusion 0.05  pantoprazole   Suspension 40  vasopressin Infusion 0.01  zinc sulfate 220      ANTIBIOTICS:

## 2020-04-02 NOTE — PROGRESS NOTE ADULT - SUBJECTIVE AND OBJECTIVE BOX
Patient is a 39y old  Male who presents with a chief complaint of Acute hypoxemic respiratory failure (02 Apr 2020 10:05)      T(F): 103.4 (04-02-20 @ 08:00), Max: 103.4 (04-02-20 @ 08:00)  HR: 85 (04-02-20 @ 10:19)  BP: 109/69 (04-02-20 @ 10:19)  RR: 26 (04-02-20 @ 10:19)  SpO2: 100% (04-02-20 @ 08:33) (96% - 100%)    PHYSICAL EXAM:  GENERAL: NAD, well-groomed, well-developed  HEAD:  Atraumatic, Normocephalic  EYES: EOMI, PERRLA, conjunctiva and sclera clear  ENMT: No tonsillar erythema, exudates, or enlargement; Moist mucous membranes, Good dentition, No lesions  NECK: Supple, No JVD, Normal thyroid  NERVOUS SYSTEM:  Alert & Oriented X3, Good concentration; Motor Strength 5/5 B/L upper and lower extremities; DTRs 2+ intact and symmetric  CHEST/LUNG: Clear to percussion bilaterally; No rales, rhonchi, wheezing, or rubs  HEART: Regular rate and rhythm; No murmurs, rubs, or gallops  ABDOMEN: Soft, Nontender, Nondistended; Bowel sounds present  EXTREMITIES:  2+ Peripheral Pulses, No clubbing, cyanosis, or edema  LYMPH: No lymphadenopathy noted  SKIN: No rashes or lesions    LABS      Mode: Auto Mode: PRVC/ Volume Support  RR (machine): 17  TV (machine): 450  FiO2: 100  PEEP: 12  MAP: 17  PIP: 25    Culture Results:   No growth (03-28-20)  Culture Results:   No Growth Final (03-28-20)  Culture Results:   No Growth Final (03-28-20)  Culture Results:   <10,000 CFU/mL Normal Urogenital  (03-24-20)  Culture Results:   No growth at 5 days. (03-23-20)  Culture Results:   No growth at 5 days. (03-23-20)    RADIOLOGY    MEDICATIONS  (STANDING):  chlorhexidine 0.12% Liquid 15 milliLiter(s) Oral Mucosa two times a day  chlorhexidine 4% Liquid 1 Application(s) Topical two times a day  dexMEDEtomidine Infusion 0.2 MICROgram(s)/kG/Hr (4.89 mL/Hr) IV Continuous <Continuous>  heparin  Infusion. 1700 Unit(s)/Hr (17 mL/Hr) IV Continuous <Continuous>  midazolam Infusion 0.02 mG/kG/Hr (1.96 mL/Hr) IV Continuous <Continuous>  norepinephrine Infusion 0.05 MICROgram(s)/kG/Min (4.22 mL/Hr) IV Continuous <Continuous>  pantoprazole   Suspension 40 milliGRAM(s) Oral daily  vasopressin Infusion 0.01 Unit(s)/Min (0.6 mL/Hr) IV Continuous <Continuous>  zinc sulfate 220 milliGRAM(s) Oral daily    MEDICATIONS  (PRN):  acetaminophen    Suspension .. 650 milliGRAM(s) Oral every 6 hours PRN Temp greater or equal to 38C (100.4F)  acetaminophen  Suppository .. 650 milliGRAM(s) Rectal every 4 hours PRN Mild Pain (1 - 3) Patient is a 39y old  Male who presents with a chief complaint of Acute hypoxemic respiratory failure (02 Apr 2020 10:05)      T(F): 103.4 (04-02-20 @ 08:00), Max: 103.4 (04-02-20 @ 08:00)  HR: 85 (04-02-20 @ 10:19)  BP: 109/69 (04-02-20 @ 10:19)  RR: 26 (04-02-20 @ 10:19)  SpO2: 100% (04-02-20 @ 08:33) (96% - 100%)    PHYSICAL EXAM:  GENERAL: NAD  HEAD:  Atraumatic, Normocephalic  NERVOUS SYSTEM:  no focal deficits  CHEST/LUNG: b/l rhonchi  HEART: Regular rate and rhythm; No murmurs, rubs, or gallops  ABDOMEN: Soft, Nontender, Nondistended; Bowel sounds present  EXTREMITIES:  b/l edema          Mode: Auto Mode: PRVC/ Volume Support  RR (machine): 17  TV (machine): 450  FiO2: 100  PEEP: 12  MAP: 17  PIP: 25    Culture Results:   No growth (03-28-20)  Culture Results:   No Growth Final (03-28-20)  Culture Results:   No Growth Final (03-28-20)  Culture Results:   <10,000 CFU/mL Normal Urogenital  (03-24-20)  Culture Results:   No growth at 5 days. (03-23-20)  Culture Results:   No growth at 5 days. (03-23-20)    RADIOLOGY  < from: Xray Chest 1 View- PORTABLE-Routine (04.02.20 @ 06:01) >  Impression:      Bilateral airspace opacities, unchanged.    Lines and tubes, appropriate positions.    < end of copied text >    MEDICATIONS  (STANDING):  chlorhexidine 0.12% Liquid 15 milliLiter(s) Oral Mucosa two times a day  chlorhexidine 4% Liquid 1 Application(s) Topical two times a day  dexMEDEtomidine Infusion 0.2 MICROgram(s)/kG/Hr (4.89 mL/Hr) IV Continuous <Continuous>  heparin  Infusion. 1700 Unit(s)/Hr (17 mL/Hr) IV Continuous <Continuous>  midazolam Infusion 0.02 mG/kG/Hr (1.96 mL/Hr) IV Continuous <Continuous>  norepinephrine Infusion 0.05 MICROgram(s)/kG/Min (4.22 mL/Hr) IV Continuous <Continuous>  pantoprazole   Suspension 40 milliGRAM(s) Oral daily  vasopressin Infusion 0.01 Unit(s)/Min (0.6 mL/Hr) IV Continuous <Continuous>  zinc sulfate 220 milliGRAM(s) Oral daily    MEDICATIONS  (PRN):  acetaminophen    Suspension .. 650 milliGRAM(s) Oral every 6 hours PRN Temp greater or equal to 38C (100.4F)  acetaminophen  Suppository .. 650 milliGRAM(s) Rectal every 4 hours PRN Mild Pain (1 - 3)

## 2020-04-02 NOTE — PROGRESS NOTE ADULT - PROBLEM SELECTOR PLAN 1
acute covid 19   vented  s/p  HCQ and IV Toci     supportive care   follow temps and clinical progress    if still febrile - procalcitonin levels to r/o bacterial infection

## 2020-04-03 DIAGNOSIS — R50.9 FEVER, UNSPECIFIED: ICD-10-CM

## 2020-04-03 LAB
ANION GAP SERPL CALC-SCNC: 9 MMOL/L — SIGNIFICANT CHANGE UP (ref 7–14)
APTT BLD: 72.6 SEC — CRITICAL HIGH (ref 27–39.2)
APTT BLD: 84.6 SEC — CRITICAL HIGH (ref 27–39.2)
BUN SERPL-MCNC: 17 MG/DL — SIGNIFICANT CHANGE UP (ref 10–20)
CALCIUM SERPL-MCNC: 8.1 MG/DL — LOW (ref 8.5–10.1)
CHLORIDE SERPL-SCNC: 105 MMOL/L — SIGNIFICANT CHANGE UP (ref 98–110)
CO2 SERPL-SCNC: 31 MMOL/L — SIGNIFICANT CHANGE UP (ref 17–32)
CREAT SERPL-MCNC: 0.7 MG/DL — SIGNIFICANT CHANGE UP (ref 0.7–1.5)
ESTIMATED AVERAGE GLUCOSE: 126 MG/DL — HIGH (ref 68–114)
GAS PNL BLDA: SIGNIFICANT CHANGE UP
GLUCOSE SERPL-MCNC: 147 MG/DL — HIGH (ref 70–99)
HBA1C BLD-MCNC: 6 % — HIGH (ref 4–5.6)
HCT VFR BLD CALC: 44.1 % — SIGNIFICANT CHANGE UP (ref 42–52)
HGB BLD-MCNC: 13.5 G/DL — LOW (ref 14–18)
INR BLD: 1.21 RATIO — SIGNIFICANT CHANGE UP (ref 0.65–1.3)
MAGNESIUM SERPL-MCNC: 2.3 MG/DL — SIGNIFICANT CHANGE UP (ref 1.8–2.4)
MCHC RBC-ENTMCNC: 28.8 PG — SIGNIFICANT CHANGE UP (ref 27–31)
MCHC RBC-ENTMCNC: 30.6 G/DL — LOW (ref 32–37)
MCV RBC AUTO: 94 FL — SIGNIFICANT CHANGE UP (ref 80–94)
NRBC # BLD: 0 /100 WBCS — SIGNIFICANT CHANGE UP (ref 0–0)
PLATELET # BLD AUTO: 265 K/UL — SIGNIFICANT CHANGE UP (ref 130–400)
POTASSIUM SERPL-MCNC: 4.8 MMOL/L — SIGNIFICANT CHANGE UP (ref 3.5–5)
POTASSIUM SERPL-SCNC: 4.8 MMOL/L — SIGNIFICANT CHANGE UP (ref 3.5–5)
PROCALCITONIN SERPL-MCNC: 0.26 NG/ML — HIGH (ref 0.02–0.1)
PROTHROM AB SERPL-ACNC: 13.9 SEC — HIGH (ref 9.95–12.87)
RBC # BLD: 4.69 M/UL — LOW (ref 4.7–6.1)
RBC # FLD: 17.2 % — HIGH (ref 11.5–14.5)
SODIUM SERPL-SCNC: 145 MMOL/L — SIGNIFICANT CHANGE UP (ref 135–146)
WBC # BLD: 4.66 K/UL — LOW (ref 4.8–10.8)
WBC # FLD AUTO: 4.66 K/UL — LOW (ref 4.8–10.8)

## 2020-04-03 PROCEDURE — 71045 X-RAY EXAM CHEST 1 VIEW: CPT | Mod: 26

## 2020-04-03 PROCEDURE — 99233 SBSQ HOSP IP/OBS HIGH 50: CPT

## 2020-04-03 RX ORDER — CEFEPIME 1 G/1
1000 INJECTION, POWDER, FOR SOLUTION INTRAMUSCULAR; INTRAVENOUS EVERY 8 HOURS
Refills: 0 | Status: DISCONTINUED | OUTPATIENT
Start: 2020-04-03 | End: 2020-04-08

## 2020-04-03 RX ORDER — HEPARIN SODIUM 5000 [USP'U]/ML
1400 INJECTION INTRAVENOUS; SUBCUTANEOUS
Qty: 25000 | Refills: 0 | Status: DISCONTINUED | OUTPATIENT
Start: 2020-04-03 | End: 2020-04-06

## 2020-04-03 RX ADMIN — DEXMEDETOMIDINE HYDROCHLORIDE IN 0.9% SODIUM CHLORIDE 4.89 MICROGRAM(S)/KG/HR: 4 INJECTION INTRAVENOUS at 05:15

## 2020-04-03 RX ADMIN — CHLORHEXIDINE GLUCONATE 1 APPLICATION(S): 213 SOLUTION TOPICAL at 05:15

## 2020-04-03 RX ADMIN — DEXMEDETOMIDINE HYDROCHLORIDE IN 0.9% SODIUM CHLORIDE 4.89 MICROGRAM(S)/KG/HR: 4 INJECTION INTRAVENOUS at 12:00

## 2020-04-03 RX ADMIN — CEFEPIME 100 MILLIGRAM(S): 1 INJECTION, POWDER, FOR SOLUTION INTRAMUSCULAR; INTRAVENOUS at 14:16

## 2020-04-03 RX ADMIN — PANTOPRAZOLE SODIUM 40 MILLIGRAM(S): 20 TABLET, DELAYED RELEASE ORAL at 14:14

## 2020-04-03 RX ADMIN — CEFEPIME 100 MILLIGRAM(S): 1 INJECTION, POWDER, FOR SOLUTION INTRAMUSCULAR; INTRAVENOUS at 07:51

## 2020-04-03 RX ADMIN — CHLORHEXIDINE GLUCONATE 15 MILLILITER(S): 213 SOLUTION TOPICAL at 18:50

## 2020-04-03 RX ADMIN — CHLORHEXIDINE GLUCONATE 15 MILLILITER(S): 213 SOLUTION TOPICAL at 05:15

## 2020-04-03 RX ADMIN — CHLORHEXIDINE GLUCONATE 1 APPLICATION(S): 213 SOLUTION TOPICAL at 18:50

## 2020-04-03 RX ADMIN — HEPARIN SODIUM 14 UNIT(S)/HR: 5000 INJECTION INTRAVENOUS; SUBCUTANEOUS at 05:16

## 2020-04-03 NOTE — PROGRESS NOTE ADULT - SUBJECTIVE AND OBJECTIVE BOX
Patient is comfortably sedated on ventilator       T(F): 96.8 (04-03-20 @ 14:08), Max: 100.7 (04-03-20 @ 00:11)  HR: 66 (04-03-20 @ 14:08)  BP: 111/72 (04-03-20 @ 14:08)  RR: 20 (04-03-20 @ 14:08)  SpO2: 93% (04-03-20 @ 12:01) (91% - 98%)    PHYSICAL EXAM:  GENERAL: NAD  HEAD:  Atraumatic, Normocephalic  NERVOUS SYSTEM:  no focal deficits   CHEST/LUNG:  bilateral rales  HEART: Regular rate and rhythm; No murmurs, rubs, or gallops  ABDOMEN: Soft, Nontender, Nondistended; Bowel sounds present  EXTREMITIES:  2+ Peripheral Pulses, No clubbing, cyanosis, or edema  LYMPH: No lymphadenopathy noted  SKIN: No rashes or lesions    LABS  04-03    145  |  105  |  17  ----------------------------<  147<H>  4.8   |  31  |  0.7    Ca    8.1<L>      03 Apr 2020 08:21  Mg     2.3     04-03                            13.5   4.66  )-----------( 265      ( 03 Apr 2020 08:21 )             44.1     PT/INR - ( 03 Apr 2020 08:21 )   PT: 13.90 sec;   INR: 1.21 ratio         PTT - ( 03 Apr 2020 08:21 )  PTT:72.6 sec  Mode: Auto Mode: PRVC/ Volume Support  RR (machine): 14  TV (machine): 450  FiO2: 65  PEEP: 12  MAP: 18  PIP: 27      Culture Results:   No growth (03-28-20)  Culture Results:   No Growth Final (03-28-20)  Culture Results:   No Growth Final (03-28-20)  Culture Results:   <10,000 CFU/mL Normal Urogenital  (03-24-20)  Culture Results:   No growth at 5 days. (03-23-20)  Culture Results:   No growth at 5 days. (03-23-20)    RADIOLOGY  < from: Xray Chest 1 View- PORTABLE-Routine (04.03.20 @ 06:31) >    Impression:    No significant interval change bilateral opacities. No pneumothorax    < end of copied text >    MEDICATIONS  (STANDING):  cefepime   IVPB 1000 milliGRAM(s) IV Intermittent every 8 hours  chlorhexidine 0.12% Liquid 15 milliLiter(s) Oral Mucosa two times a day  chlorhexidine 4% Liquid 1 Application(s) Topical two times a day  dexMEDEtomidine Infusion 0.2 MICROgram(s)/kG/Hr (4.89 mL/Hr) IV Continuous <Continuous>  heparin  Infusion 1400 Unit(s)/Hr (14 mL/Hr) IV Continuous <Continuous>  midazolam Infusion 0.02 mG/kG/Hr (1.96 mL/Hr) IV Continuous <Continuous>  norepinephrine Infusion 0.05 MICROgram(s)/kG/Min (4.22 mL/Hr) IV Continuous <Continuous>  pantoprazole   Suspension 40 milliGRAM(s) Oral daily  vasopressin Infusion 0.01 Unit(s)/Min (0.6 mL/Hr) IV Continuous <Continuous>  zinc sulfate 220 milliGRAM(s) Oral daily    MEDICATIONS  (PRN):  acetaminophen    Suspension .. 650 milliGRAM(s) Oral every 6 hours PRN Temp greater or equal to 38C (100.4F)  acetaminophen  Suppository .. 650 milliGRAM(s) Rectal every 4 hours PRN Mild Pain (1 - 3)

## 2020-04-03 NOTE — CHART NOTE - NSCHARTNOTEFT_GEN_A_CORE
In depth phone conversation regarding patient's current clinical condition, diagnosis, therapy, further options for care, and plan moving forward WITH BROTHER NITHYA MARIE            .   All questions answered at length to the satisfaction of all participants.

## 2020-04-03 NOTE — PROGRESS NOTE ADULT - SUBJECTIVE AND OBJECTIVE BOX
Over Night Events: none    Remains intubated and sedated    PHYSICAL EXAM    ICU Vital Signs Last 24 Hrs  T(C): 36.1 (03 Apr 2020 10:14), Max: 39.4 (02 Apr 2020 12:17)  T(F): 97 (03 Apr 2020 10:14), Max: 103 (02 Apr 2020 12:17)  HR: 76 (03 Apr 2020 10:14) (68 - 82)  BP: 108/62 (03 Apr 2020 10:14) (103/57 - 117/70)  RR: 25 (03 Apr 2020 10:14) (14 - 28)  SpO2: 91% (03 Apr 2020 08:02) (91% - 98%)      General: Not in distress  HEENT: TIFFANIE  +ETT           Lungs: Bilateral BS CTA  Cardiovascular: +S1 S2 RRR  Gastrointestinal: Soft, Positive BS  Extremities: No edema    Skin: Warm, intact  Neurological: Sedated      04-02-20 @ 07:01  -  04-03-20 @ 07:00  --------------------------------------------------------  IN:    dexmedetomidine Infusion: 276 mL    Enteral Tube Flush: 780 mL    heparin  Infusion.: 153 mL    heparin Infusion: 90 mL    heparin Infusion: 56 mL    midazolam Infusion: 96 mL    norepinephrine Infusion: 420 mL    Peptamen A.F.: 720 mL  Total IN: 2591 mL    OUT:    Indwelling Catheter - Urethral: 1250 mL  Total OUT: 1250 mL    Total NET: 1341 mL          LABS:                            13.5   4.66  )-----------( 265      ( 03 Apr 2020 08:21 )             44.1                                               04-03    145  |  105  |  17  ----------------------------<  147<H>  4.8   |  31  |  0.7    Ca    8.1<L>      03 Apr 2020 08:21  Mg     2.3     04-03        PT/INR - ( 03 Apr 2020 08:21 )   PT: 13.90 sec;   INR: 1.21 ratio         PTT - ( 03 Apr 2020 08:21 )  PTT:72.6 sec                                                                                                                                                                                                               ABG - ( 03 Apr 2020 06:25 )  pH, Arterial: 7.36  pH, Blood: x     /  pCO2: 58    /  pO2: 84    / HCO3: 32    / Base Excess: 4.3   /  SaO2: 95          Ferritin, Serum: 2414 ng/mL (03-28-20 @ 23:05)      Trend LDH  03-28-20 @ 15:00  473<H>              Procalcitonin, Serum: 0.26 ng/mL (04-02-20 @ 15:00)  Procalcitonin, Serum: 0.09 ng/mL (03-28-20 @ 23:05)      CXR read by me: ett and og ok, marcelo opac     MEDICATIONS  (STANDING):  cefepime   IVPB 1000 milliGRAM(s) IV Intermittent every 8 hours  chlorhexidine 0.12% Liquid 15 milliLiter(s) Oral Mucosa two times a day  chlorhexidine 4% Liquid 1 Application(s) Topical two times a day  dexMEDEtomidine Infusion 0.2 MICROgram(s)/kG/Hr (4.89 mL/Hr) IV Continuous <Continuous>  heparin  Infusion 1400 Unit(s)/Hr (14 mL/Hr) IV Continuous <Continuous>  midazolam Infusion 0.02 mG/kG/Hr (1.96 mL/Hr) IV Continuous <Continuous>  norepinephrine Infusion 0.05 MICROgram(s)/kG/Min (4.22 mL/Hr) IV Continuous <Continuous>  pantoprazole   Suspension 40 milliGRAM(s) Oral daily  vasopressin Infusion 0.01 Unit(s)/Min (0.6 mL/Hr) IV Continuous <Continuous>  zinc sulfate 220 milliGRAM(s) Oral daily    MEDICATIONS  (PRN):  acetaminophen    Suspension .. 650 milliGRAM(s) Oral every 6 hours PRN Temp greater or equal to 38C (100.4F)  acetaminophen  Suppository .. 650 milliGRAM(s) Rectal every 4 hours PRN Mild Pain (1 - 3)

## 2020-04-03 NOTE — PROGRESS NOTE ADULT - SUBJECTIVE AND OBJECTIVE BOX
EBONY MARIE  39y, Male  Allergy: No Known Allergies      CHIEF COMPLAINT: Acute hypoxemic respiratory failure (02 Apr 2020 11:38)      INTERVAL EVENTS/HPI  - No acute events overnight  - T(F): , Max: 103.4 (04-02-20 @ 08:00)    ROS  UTO     SOCIAL HISTORY  Social History:  Unable to obtain as pt is intubated. (28 Mar 2020 17:00)        FH noncontributory     VITALS:  T(F): 100.1, Max: 103.4 (04-02-20 @ 08:00)  HR: 82  BP: 112/68  RR: 26Vital Signs Last 24 Hrs  T(C): 37.8 (03 Apr 2020 05:00), Max: 39.7 (02 Apr 2020 08:00)  T(F): 100.1 (03 Apr 2020 05:00), Max: 103.4 (02 Apr 2020 08:00)  HR: 82 (03 Apr 2020 06:00) (68 - 92)  BP: 112/68 (03 Apr 2020 06:00) (103/57 - 117/70)  BP(mean): --  RR: 26 (03 Apr 2020 06:00) (14 - 28)  SpO2: 98% (03 Apr 2020 06:00) (93% - 100%)    PHYSICAL EXAM:  Gen: vented  HEENT: Normocephalic, atraumatic  Neck: supple, no lymphadenopathy  CV: S1  s2 +   Lungs: decreased BS   Abdomen: Soft, BS present  Ext: Warm, well perfused  Neuro: non focal  Skin: no rash, no erythema      TESTS & MEASUREMENTS:                        13.2   4.00  )-----------( 220      ( 02 Apr 2020 12:08 )             43.5     04-02    145  |  107  |  19  ----------------------------<  140<H>  5.2<H>   |  28  |  0.7    Ca    7.8<L>      02 Apr 2020 12:08      eGFR if Non African American: 119 mL/min/1.73M2 (04-02-20 @ 12:08)  eGFR if : 138 mL/min/1.73M2 (04-02-20 @ 12:08)          Culture - Urine (collected 03-28-20 @ 16:00)  Source: .Urine Clean Catch (Midstream)  Final Report (03-29-20 @ 20:18):    No growth    Culture - Blood (collected 03-28-20 @ 15:00)  Source: .Blood Blood-Peripheral  Final Report (04-01-20 @ 23:00):    No Growth Final    Culture - Blood (collected 03-28-20 @ 15:00)  Source: .Blood Blood-Peripheral  Final Report (04-01-20 @ 23:00):    No Growth Final            INFECTIOUS DISEASES TESTING      RADIOLOGY & ADDITIONAL TESTS:  I have personally reviewed the last Chest xray  CXR  Xray Chest 1 View- PORTABLE-Urgent:   EXAM:  XR CHEST PORTABLE URGENT 1V            PROCEDURE DATE:  03/31/2020            INTERPRETATION:  Clinical History / Reason for exam: Hypoxic respiratory failure, central line placement    Comparison : Chest radiograph 3/31/2020.    Technique/Positioning: AP portable    Findings:    Support devices: Enteric tube tip below left hemidiaphragm. Right internal jugular venous catheter tip overlies the cavoatrial junction..    Cardiac/mediastinum/hilum: Unchanged.    Lung parenchyma/Pleura: Bilateral left greater than right unchanged interstitial opacities present. No pneumothorax or pleural effusion. Skeleton/soft tissues: Stable.    Impression:        1. Stable support lines and tubes  2. Bilateral, left greater than right interstitial opacities consistent with pneumonia in the appropriate clinical setting                      QASIM YIN M.D., ATTENDING RADIOLOGIST  This document has been electronically signed. Mar 31 2020  2:34PM             (03-31-20 @ 14:20)      CT      CARDIOLOGY TESTING  12 Lead ECG:   Ventricular Rate 75 BPM    Atrial Rate 75 BPM    P-R Interval 150 ms    QRS Duration 96 ms    Q-T Interval 368 ms    QTC Calculation(Bezet) 410 ms    P Axis 72 degrees    R Axis 68 degrees    T Axis 42 degrees    Diagnosis Line Normal sinus rhythm  Incomplete right bundle branch block  Borderline ECG    Confirmed by BECKA MORRISON MD ([id number]) on 4/2/2020 1:01:30 PM (04-01-20 @ 12:22)      MEDICATIONS  chlorhexidine 0.12% Liquid 15  chlorhexidine 4% Liquid 1  dexMEDEtomidine Infusion 0.2  heparin  Infusion 1400  midazolam Infusion 0.02  norepinephrine Infusion 0.05  pantoprazole   Suspension 40  vasopressin Infusion 0.01  zinc sulfate 220      ANTIBIOTICS:             (New problem with additional W/U....)      Pt has an acute illness which poses threat to bodily function (level 3 consult) or chronic illness with mild progression (level 2 consult) or undiagnosed new problem with uncertain prognosis (level 2 consult) or acute illness with systemic symptoms (level 2 consult)     (Established problem, worsening..)      (Established problem, stable or improving.)

## 2020-04-03 NOTE — PROGRESS NOTE ADULT - ASSESSMENT
39 M w/ PMH of Development delay / Nonverbal / from a group home brought in on 3/23 w/ fever - was COVID 19 + -> then dc -> then p/w AHRF 76% on RA -> intubated in ER - CXR shows bilateral opacities    # Acute Hypoxic Respiratory Failure secondary to ARDS from Sepsis due to COVID 19 Pneumonia     - cxr mildly improved    -  IV heparin check ptt and adjust  - C/w PLaquenil  - high fio2 requirements   - received  tocilizumab 400mg   -  tube feeds   - brother  received update over phone and updated him on pts status and plan all questions answered     DVTppx  GIppx    FULL CODE

## 2020-04-03 NOTE — PROGRESS NOTE ADULT - ASSESSMENT
IMPRESSION:    Acute hypoxic respiratory failure  ? VTE/PE  Possible CRS s/p Toci  Septic shock  COVID PNA s/p plaquenil   Possible superinfection   HO Developmental delay from group home    PLAN:    CNS: morphine/versed for sedation    HEENT: oral care     PULMONARY: HOB 45 degrees, vent changes: taper O2    CARDIOVASCULAR: wean levophed, 500cc LR bolus    GI: GI prophylaxis. OG Feeding     RENAL: follow lytes IS and OS    INFECTIOUS DISEASE: abx per ID, send deep tracheal aspirate, repeat bcx    HEMATOLOGICAL:  DVT prophylaxis.    ENDOCRINE:  Follow up FS.  Insulin protocol if needed.    MUSCULOSKELETAL: Bedrest, LE venous duplex negative    Keep torres and TLC    ICU monitoring for now.

## 2020-04-04 LAB
ANION GAP SERPL CALC-SCNC: 13 MMOL/L — SIGNIFICANT CHANGE UP (ref 7–14)
APTT BLD: 59.3 SEC — HIGH (ref 27–39.2)
APTT BLD: 68.1 SEC — HIGH (ref 27–39.2)
BASE EXCESS BLDA CALC-SCNC: 5.8 MMOL/L — HIGH (ref -2–2)
BASOPHILS # BLD AUTO: 0.03 K/UL — SIGNIFICANT CHANGE UP (ref 0–0.2)
BASOPHILS NFR BLD AUTO: 0.6 % — SIGNIFICANT CHANGE UP (ref 0–1)
BUN SERPL-MCNC: 17 MG/DL — SIGNIFICANT CHANGE UP (ref 10–20)
CALCIUM SERPL-MCNC: 8.4 MG/DL — LOW (ref 8.5–10.1)
CHLORIDE SERPL-SCNC: 101 MMOL/L — SIGNIFICANT CHANGE UP (ref 98–110)
CO2 SERPL-SCNC: 29 MMOL/L — SIGNIFICANT CHANGE UP (ref 17–32)
CREAT SERPL-MCNC: 0.7 MG/DL — SIGNIFICANT CHANGE UP (ref 0.7–1.5)
CRP SERPL-MCNC: 2.44 MG/DL — HIGH (ref 0–0.4)
CULTURE RESULTS: SIGNIFICANT CHANGE UP
D DIMER BLD IA.RAPID-MCNC: 1867 NG/ML DDU — HIGH (ref 0–230)
EOSINOPHIL # BLD AUTO: 0.12 K/UL — SIGNIFICANT CHANGE UP (ref 0–0.7)
EOSINOPHIL NFR BLD AUTO: 2.2 % — SIGNIFICANT CHANGE UP (ref 0–8)
GLUCOSE SERPL-MCNC: 129 MG/DL — HIGH (ref 70–99)
HCO3 BLDA-SCNC: 32 MMOL/L — HIGH (ref 23–27)
HCT VFR BLD CALC: 43.2 % — SIGNIFICANT CHANGE UP (ref 42–52)
HGB BLD-MCNC: 13.4 G/DL — LOW (ref 14–18)
IMM GRANULOCYTES NFR BLD AUTO: 2.2 % — HIGH (ref 0.1–0.3)
INR BLD: 1.11 RATIO — SIGNIFICANT CHANGE UP (ref 0.65–1.3)
LDH SERPL L TO P-CCNC: 410 U/L — HIGH (ref 50–242)
LYMPHOCYTES # BLD AUTO: 0.51 K/UL — LOW (ref 1.2–3.4)
LYMPHOCYTES # BLD AUTO: 9.5 % — LOW (ref 20.5–51.1)
MAGNESIUM SERPL-MCNC: 2.1 MG/DL — SIGNIFICANT CHANGE UP (ref 1.8–2.4)
MCHC RBC-ENTMCNC: 28.9 PG — SIGNIFICANT CHANGE UP (ref 27–31)
MCHC RBC-ENTMCNC: 31 G/DL — LOW (ref 32–37)
MCV RBC AUTO: 93.1 FL — SIGNIFICANT CHANGE UP (ref 80–94)
MONOCYTES # BLD AUTO: 0.33 K/UL — SIGNIFICANT CHANGE UP (ref 0.1–0.6)
MONOCYTES NFR BLD AUTO: 6.2 % — SIGNIFICANT CHANGE UP (ref 1.7–9.3)
NEUTROPHILS # BLD AUTO: 4.25 K/UL — SIGNIFICANT CHANGE UP (ref 1.4–6.5)
NEUTROPHILS NFR BLD AUTO: 79.3 % — HIGH (ref 42.2–75.2)
NRBC # BLD: 0 /100 WBCS — SIGNIFICANT CHANGE UP (ref 0–0)
PCO2 BLDA: 52 MMHG — HIGH (ref 38–42)
PH BLDA: 7.4 — SIGNIFICANT CHANGE UP (ref 7.38–7.42)
PLATELET # BLD AUTO: 307 K/UL — SIGNIFICANT CHANGE UP (ref 130–400)
PO2 BLDA: 249 MMHG — HIGH (ref 78–95)
POTASSIUM SERPL-MCNC: 5.2 MMOL/L — HIGH (ref 3.5–5)
POTASSIUM SERPL-SCNC: 5.2 MMOL/L — HIGH (ref 3.5–5)
PROCALCITONIN SERPL-MCNC: 0.22 NG/ML — HIGH (ref 0.02–0.1)
PROTHROM AB SERPL-ACNC: 12.8 SEC — SIGNIFICANT CHANGE UP (ref 9.95–12.87)
RBC # BLD: 4.64 M/UL — LOW (ref 4.7–6.1)
RBC # FLD: 16.8 % — HIGH (ref 11.5–14.5)
SAO2 % BLDA: 100 % — HIGH (ref 94–98)
SODIUM SERPL-SCNC: 143 MMOL/L — SIGNIFICANT CHANGE UP (ref 135–146)
SPECIMEN SOURCE: SIGNIFICANT CHANGE UP
WBC # BLD: 5.36 K/UL — SIGNIFICANT CHANGE UP (ref 4.8–10.8)
WBC # FLD AUTO: 5.36 K/UL — SIGNIFICANT CHANGE UP (ref 4.8–10.8)

## 2020-04-04 PROCEDURE — 71045 X-RAY EXAM CHEST 1 VIEW: CPT | Mod: 26,76,77

## 2020-04-04 PROCEDURE — 99233 SBSQ HOSP IP/OBS HIGH 50: CPT

## 2020-04-04 PROCEDURE — 71045 X-RAY EXAM CHEST 1 VIEW: CPT | Mod: 26

## 2020-04-04 PROCEDURE — 99291 CRITICAL CARE FIRST HOUR: CPT

## 2020-04-04 RX ORDER — MORPHINE SULFATE 50 MG/1
1 CAPSULE, EXTENDED RELEASE ORAL
Qty: 100 | Refills: 0 | Status: DISCONTINUED | OUTPATIENT
Start: 2020-04-04 | End: 2020-04-08

## 2020-04-04 RX ORDER — MORPHINE SULFATE 50 MG/1
6 CAPSULE, EXTENDED RELEASE ORAL ONCE
Refills: 0 | Status: DISCONTINUED | OUTPATIENT
Start: 2020-04-04 | End: 2020-04-04

## 2020-04-04 RX ORDER — VASOPRESSIN 20 [USP'U]/ML
0.05 INJECTION INTRAVENOUS
Qty: 50 | Refills: 0 | Status: DISCONTINUED | OUTPATIENT
Start: 2020-04-04 | End: 2020-04-06

## 2020-04-04 RX ORDER — MIDAZOLAM HYDROCHLORIDE 1 MG/ML
0.02 INJECTION, SOLUTION INTRAMUSCULAR; INTRAVENOUS
Qty: 100 | Refills: 0 | Status: DISCONTINUED | OUTPATIENT
Start: 2020-04-04 | End: 2020-04-07

## 2020-04-04 RX ORDER — CHLORHEXIDINE GLUCONATE 213 G/1000ML
1 SOLUTION TOPICAL
Refills: 0 | Status: DISCONTINUED | OUTPATIENT
Start: 2020-04-04 | End: 2020-04-08

## 2020-04-04 RX ADMIN — CHLORHEXIDINE GLUCONATE 1 APPLICATION(S): 213 SOLUTION TOPICAL at 05:28

## 2020-04-04 RX ADMIN — CEFEPIME 100 MILLIGRAM(S): 1 INJECTION, POWDER, FOR SOLUTION INTRAMUSCULAR; INTRAVENOUS at 21:11

## 2020-04-04 RX ADMIN — CHLORHEXIDINE GLUCONATE 1 APPLICATION(S): 213 SOLUTION TOPICAL at 17:59

## 2020-04-04 RX ADMIN — CHLORHEXIDINE GLUCONATE 15 MILLILITER(S): 213 SOLUTION TOPICAL at 17:59

## 2020-04-04 RX ADMIN — PANTOPRAZOLE SODIUM 40 MILLIGRAM(S): 20 TABLET, DELAYED RELEASE ORAL at 12:18

## 2020-04-04 RX ADMIN — MORPHINE SULFATE 6 MILLIGRAM(S): 50 CAPSULE, EXTENDED RELEASE ORAL at 01:07

## 2020-04-04 RX ADMIN — MIDAZOLAM HYDROCHLORIDE 1.96 MG/KG/HR: 1 INJECTION, SOLUTION INTRAMUSCULAR; INTRAVENOUS at 12:19

## 2020-04-04 RX ADMIN — MIDAZOLAM HYDROCHLORIDE 1.96 MG/KG/HR: 1 INJECTION, SOLUTION INTRAMUSCULAR; INTRAVENOUS at 21:48

## 2020-04-04 RX ADMIN — CEFEPIME 100 MILLIGRAM(S): 1 INJECTION, POWDER, FOR SOLUTION INTRAMUSCULAR; INTRAVENOUS at 00:48

## 2020-04-04 RX ADMIN — CEFEPIME 100 MILLIGRAM(S): 1 INJECTION, POWDER, FOR SOLUTION INTRAMUSCULAR; INTRAVENOUS at 05:29

## 2020-04-04 RX ADMIN — ZINC SULFATE TAB 220 MG (50 MG ZINC EQUIVALENT) 220 MILLIGRAM(S): 220 (50 ZN) TAB at 12:18

## 2020-04-04 RX ADMIN — CHLORHEXIDINE GLUCONATE 15 MILLILITER(S): 213 SOLUTION TOPICAL at 05:28

## 2020-04-04 RX ADMIN — CEFEPIME 100 MILLIGRAM(S): 1 INJECTION, POWDER, FOR SOLUTION INTRAMUSCULAR; INTRAVENOUS at 15:57

## 2020-04-04 NOTE — PROGRESS NOTE ADULT - SUBJECTIVE AND OBJECTIVE BOX
CTU Attending Progress Daily Note     04 Apr 2020 18:17  Admited 03-28-20, Hospital Day 7d    HPI:  38 yo M with PMHx of developmental delay (nonverbal at baseline), HLD who presents from [facility] for sob. Per chart review, pt was seen in the ED on 3/23 for fever and decreased po intake. Labs at that time were notable for WBC 2, AST/ALT 46/71, negative blood cx x2, cxr with bilateral opacities. Pt tolerated po and was d/c'ed back to . Pt returned on 3/24 with fever, cough, and vomiting episodes after coughing. UA/urine cx negative, CT abd negative for acute abd pathology but showed RLL ground glass opacity. Pt was d/c'ed back to  and instructed to quarantine for possible COVID. Pt then returned to ED today with worsening sob over past several days.    In ED, HR 100s, rectal temp 101, normotensive, tachypneic 30s-40s, satting 76% on RA. SpO2 improved to 91% on NRB but pt still tachypneic and not tolerating NRB well so was intubated and started on precedex. Labs notable for WBC 2, AST 42, , trop <0.01. UA negative. Cxr with increased bilateral infiltrates. Pending COVID results.     Upon admission, pt was intubated, withdrawing to pain, not following commands, bucking the vent. Propofol started. Aide not at bedside for further collateral. (28 Mar 2020 17:00)    Home Medications:  ammonium lactate topical:  (23 Mar 2020 11:37)  atorvastatin 10 mg oral tablet:  (23 Mar 2020 11:37)  clotrimazole topical:  (23 Mar 2020 11:37)  Vitamin D2:  (23 Mar 2020 11:37)    FAMILY HISTORY:    PAST MEDICAL & SURGICAL HISTORY:  Mentally challenged  High blood cholesterol  No significant past surgical history    Interval event for past 24 hr:  EBONY MARIE  39y had no event.   Current Complains:  EBONY MARIE has no new complains  Allergies    No Known Allergies    Intolerances      OBJECTIVE:  Vitals last 24 hrs  T(C): 37.6 (04-04-20 @ 18:00), Max: 37.8 (04-04-20 @ 12:00)  T(F): 99.6 (04-04-20 @ 18:00), Max: 100.1 (04-04-20 @ 12:00)  HR: 106 (04-04-20 @ 14:00) (72 - 106)  BP: 90/57 (04-04-20 @ 18:00) (82/53 - 134/79)  ABP: --  ABP(mean): --  RR: 22 (04-04-20 @ 18:00) (22 - 25)  SpO2: 96% (04-04-20 @ 18:00) (93% - 100%)      04-03-20 @ 07:01  -  04-04-20 @ 07:00  --------------------------------------------------------  IN:    dexmedetomidine Infusion: 39 mL    heparin Infusion: 126 mL    IV PiggyBack: 50 mL    midazolam Infusion: 77.8 mL    morphine  Infusion.: 35 mL    norepinephrine Infusion: 109.8 mL    Peptamen A.F.: 360 mL  Total IN: 797.6 mL    OUT:    Indwelling Catheter - Urethral: 1000 mL  Total OUT: 1000 mL    Total NET: -202.4 mL        Mode: AC/ CMV (Assist Control/ Continuous Mandatory Ventilation)  RR (machine): 28  FiO2: 60  PEEP: 15  PC: 30  PIP: 30    CAPILLARY BLOOD GLUCOSE        LABS:  ABG - ( 04 Apr 2020 07:04 )  pH, Arterial: 7.40  pH, Blood: x     /  pCO2: 52    /  pO2: 249   / HCO3: 32    / Base Excess: 5.8   /  SaO2: 100             Blood Gas Arterial, Lactate: 1.6 mmoL/L (04-04-20 @ 07:04)                          13.4   5.36  )-----------( 307      ( 04 Apr 2020 05:14 )             43.2     Hemoglobin: 13.4 g/dL (04-04 @ 05:14)  Hemoglobin: 13.5 g/dL (04-03 @ 08:21)  Hemoglobin: 13.2 g/dL (04-02 @ 12:08)    04-04    143  |  101  |  17  ----------------------------<  129<H>  5.2<H>   |  29  |  0.7    Ca    8.4<L>      04 Apr 2020 05:14  Mg     2.1     04-04      Creatinine, Serum: 0.7 mg/dL (04-04 @ 05:14)  Creatinine, Serum: 0.7 mg/dL (04-03 @ 08:21)  Creatinine, Serum: 0.7 mg/dL (04-02 @ 12:08)    PT/INR - ( 04 Apr 2020 05:14 )   PT: 12.80 sec;   INR: 1.11 ratio         PTT - ( 04 Apr 2020 16:50 )  PTT:59.3 sec      Culture - Nose (collected 02 Apr 2020 18:43)  Source: .Nose Nose  Preliminary Report (03 Apr 2020 21:21):    Culture in progress      HOSPITAL MEDICATIONS:  MEDICATIONS  (STANDING):  cefepime   IVPB 1000 milliGRAM(s) IV Intermittent every 8 hours  chlorhexidine 0.12% Liquid 15 milliLiter(s) Oral Mucosa two times a day  chlorhexidine 4% Liquid 1 Application(s) Topical <User Schedule>  chlorhexidine 4% Liquid 1 Application(s) Topical two times a day  dexMEDEtomidine Infusion 0.2 MICROgram(s)/kG/Hr (4.89 mL/Hr) IV Continuous <Continuous>  heparin  Infusion 1400 Unit(s)/Hr (14 mL/Hr) IV Continuous <Continuous>  midazolam Infusion 0.02 mG/kG/Hr (1.96 mL/Hr) IV Continuous <Continuous>  morphine  Infusion. 1 mG/Hr (1 mL/Hr) IV Continuous <Continuous>  norepinephrine Infusion 0.05 MICROgram(s)/kG/Min (4.22 mL/Hr) IV Continuous <Continuous>  pantoprazole   Suspension 40 milliGRAM(s) Oral daily  vasopressin Infusion 0.01 Unit(s)/Min (0.6 mL/Hr) IV Continuous <Continuous>  vasopressin Infusion 0.05 Unit(s)/Min (3 mL/Hr) IV Continuous <Continuous>  zinc sulfate 220 milliGRAM(s) Oral daily    MEDICATIONS  (PRN):  acetaminophen    Suspension .. 650 milliGRAM(s) Oral every 6 hours PRN Temp greater or equal to 38C (100.4F)  acetaminophen  Suppository .. 650 milliGRAM(s) Rectal every 4 hours PRN Mild Pain (1 - 3)      REVIEW OF SYSTEMS:      [ x ] Unable to assess ROS because of sedation    PHYSICAL EXAM:          CONSTITUTIONAL: Well-developed; well-nourished; in no acute distress.   	SKIN: warm, dry, no rashes or lesions  	HEENT: Atraumatic. Normocephalic. PERRL. Moist membranes, no conjunctival injection, sclera clear  	NECK: Supple; non tender.  No JVD. No lymphadenopathy.  	CARD: Normal S1, S2. Rate and Rhythm are regular. No murmurs.  	RESP: Good air entry bilaterally, no wheezes, + rales no rhonchi.  	ABD: Soft, not tender, not distended, no CVA tendernass, no rebound no guarding, bowel sounds present  	EXT: Normal ROM.  No clubbing, no cyanosis, no pedal edema, no calf pain b/l, Peripheral pulses intact.  	LYMPH: No acute cervical adenopathy.  	NEURO: sedated  RADIOLOGY:  X Reviewed and interpreted by me  CxR from 04-04-20 shows bilateral interstitial infiltrates, no pneumothorax, no effusion, no cardiomegaly, no change comparing to 4/3  ETT above marilin in good position, NGT in place, TLC in place,

## 2020-04-04 NOTE — PROGRESS NOTE ADULT - ASSESSMENT
39 M w/ PMH of Development delay / Nonverbal / from a group home brought in on 3/23 w/ fever - was COVID 19 + -> then dc -> then p/w AHRF 76% on RA -> intubated in ER - CXR shows bilateral opacities    Acute Hypoxic Respiratory Failure secondary to ARDS from Sepsis due to COVID 19 Pneumonia  Septic shock: continue Levophed. Goal MAP>60.   Patient is intubated, sedated with Precedex on PVC IPAP 30, EPAP 15, FIO2: 80%   CXR still with bilateral diffuse patchy opacities.   D-dimer 1800, agree with heparin drip, can switch to Enoxaparin.   s/p one dose of Tocilizumab 400mg. Continue Cefepime.   Consider Solu-Medrol for hypotension.       Hypotension: from sepsis    DVTppx  GIppx    I updated his brother  about his current situation

## 2020-04-04 NOTE — PROGRESS NOTE ADULT - ASSESSMENT
PROBLEMS:  I spent 35 minutes of critical care time examining patient, reviewing vitals, labs, medications, imaging and discussing with the team goals of care to prevent life-threatening in this patient who is at high risk for deterioration or death due to:    1.	COVID + pneumonia, done with medications, Continue Ventilatory support, Drop down FiO2, increase Respiratory rate and pull up ETT 3 cm  2.	Continue IV cefepime     3.	Hypotension - continue Levophed, add vaso    PLAN  Neuro: move all 4 extremities. no sensory or motor deficits  Pain management.   acetaminophen    Suspension .. 650 milliGRAM(s) Oral every 6 hours PRN  acetaminophen  Suppository .. 650 milliGRAM(s) Rectal every 4 hours PRN  dexMEDEtomidine Infusion 0.2 MICROgram(s)/kG/Hr IV Continuous <Continuous>  midazolam Infusion 0.02 mG/kG/Hr IV Continuous <Continuous>  morphine  Infusion. 1 mG/Hr IV Continuous <Continuous>    Pulm: Wean off supplemental oxygen as able. Daily CXR. Encourage coughing, deep breathing and use of incentive spirometry.     Cardio: Monitor telemetry/alarms. Continue supportive care   norepinephrine Infusion 0.05 MICROgram(s)/kG/Min IV Continuous <Continuous>    GI: Continue stool softeners.    pantoprazole   Suspension 40 milliGRAM(s) Oral daily    Nutrition: Continue present diet  Endocrine and glucose control:   vasopressin Infusion 0.01 Unit(s)/Min IV Continuous <Continuous>  vasopressin Infusion 0.05 Unit(s)/Min IV Continuous <Continuous>    Renal: monitor urine output, supplement electrolytes as needed,     Vasc: Heparin SC and/or SCDs for DVT prophylaxis  heparin  Infusion 1400 Unit(s)/Hr IV Continuous <Continuous>    ID: Stable, no fever , no chills.  cefepime   IVPB 1000 milliGRAM(s) IV Intermittent every 8 hours    Pertinent clinical, laboratory, radiographic, hemodynamic, echocardiographic, respiratory data, microbiologic data and chart were reviewed and analyzed frequently throughout the course of the day and night. GI and DVT prophylaxis, glycemic control, head of bed elevation and skin care issues were addressed.  Patient seen, examined and plan discussed with team    [x ] The patient remains in critical and unstable condition, and requires ICU care and monitoring  [ ] The patient is improving but requires continued monitoring and adjustment of therapy

## 2020-04-04 NOTE — CHART NOTE - NSCHARTNOTEFT_GEN_A_CORE
I was called to the bedside to evaluate the patient for desaturation into the 70's.    I saw and evaluated the patient.  On exam, the patient had b/l rales. Mild agitation / distress was noted.  Hypoxia w/ a saturation in the 70's was noted. Pt was normotensive and was not tachycardic.    The patient has acute hypoxic respiratory failure 2/2 covid-19 PNA now w/ progression to ARDS.    Inteventions:  Deep suctioning and re-positioning.   Vent settings: 450/24/100/15 - lung protective strategy (ARDS protocol)  Flow meter changed. Tubing assessed for patency and air leak.   Peak pressure in the 20's.   Increased sedation: dexmedetomidine to 1 mcg/kg/h, midazolam to 0.06 mg/kg/hr  Repeat CXR - interval increase in infiltrates/opacities b/l  BP remained stable on norepinephrine at 0.4 mcg/kg/min.  Saturation improved into the 90's with the aforementioned interventions.  Will continue with close clinical monitoring. Case discussed with the patient's RN and with the covering Resident Physician.

## 2020-04-04 NOTE — PROGRESS NOTE ADULT - SUBJECTIVE AND OBJECTIVE BOX
Patient is a 39y old  Male who presents with a chief complaint of Acute hypoxemic respiratory failure (03 Apr 2020 15:27)      OVERNIGHT EVENTS: no magor events, remained intubated, sedated and on pressor     SUBJECTIVE / INTERVAL HPI: Patient seen and examined at bedside.     VITAL SIGNS:  Vital Signs Last 24 Hrs  T(C): 37.2 (04 Apr 2020 08:00), Max: 37.2 (04 Apr 2020 08:00)  T(F): 99 (04 Apr 2020 08:00), Max: 99 (04 Apr 2020 08:00)  HR: 93 (04 Apr 2020 08:00) (63 - 101)  BP: 89/60 (04 Apr 2020 08:00) (85/53 - 134/79)  BP(mean): 69 (04 Apr 2020 08:00) (66 - 101)  RR: 24 (04 Apr 2020 08:00) (20 - 25)  SpO2: 100% (04 Apr 2020 08:00) (93% - 100%)    PHYSICAL EXAM:    General: WDWN  HEENT: NC/AT; PERRL, clear conjunctiva  Neck: supple  Cardiovascular: +S1/S2; RRR  Respiratory: CTA b/l; no W/R/R  Gastrointestinal: soft, NT/ND; +BSx4  Extremities: WWP; 2+ peripheral pulses; no edema   Neurological: sedated     MEDICATIONS:  MEDICATIONS  (STANDING):  cefepime   IVPB 1000 milliGRAM(s) IV Intermittent every 8 hours  chlorhexidine 0.12% Liquid 15 milliLiter(s) Oral Mucosa two times a day  chlorhexidine 4% Liquid 1 Application(s) Topical two times a day  dexMEDEtomidine Infusion 0.2 MICROgram(s)/kG/Hr (4.89 mL/Hr) IV Continuous <Continuous>  heparin  Infusion 1400 Unit(s)/Hr (14 mL/Hr) IV Continuous <Continuous>  midazolam Infusion 0.02 mG/kG/Hr (1.96 mL/Hr) IV Continuous <Continuous>  morphine  Infusion. 1 mG/Hr (1 mL/Hr) IV Continuous <Continuous>  norepinephrine Infusion 0.05 MICROgram(s)/kG/Min (4.22 mL/Hr) IV Continuous <Continuous>  pantoprazole   Suspension 40 milliGRAM(s) Oral daily  vasopressin Infusion 0.01 Unit(s)/Min (0.6 mL/Hr) IV Continuous <Continuous>  zinc sulfate 220 milliGRAM(s) Oral daily    MEDICATIONS  (PRN):  acetaminophen    Suspension .. 650 milliGRAM(s) Oral every 6 hours PRN Temp greater or equal to 38C (100.4F)  acetaminophen  Suppository .. 650 milliGRAM(s) Rectal every 4 hours PRN Mild Pain (1 - 3)      ALLERGIES:  Allergies    No Known Allergies    Intolerances        LABS:                        13.4   5.36  )-----------( 307      ( 04 Apr 2020 05:14 )             43.2     04-04    143  |  101  |  17  ----------------------------<  129<H>  5.2<H>   |  29  |  0.7    Ca    8.4<L>      04 Apr 2020 05:14  Mg     2.1     04-04      PT/INR - ( 04 Apr 2020 05:14 )   PT: 12.80 sec;   INR: 1.11 ratio         PTT - ( 04 Apr 2020 05:14 )  PTT:68.1 sec    < from: Xray Chest 1 View- PORTABLE-Urgent (04.04.20 @ 02:52) >  Impression:      Stable bilateral opacities.    < end of copied text >  Culture - Nose . (04.02.20 @ 18:43)    Specimen Source: .Nose Nose    Culture Results:   Culture in progress    D-Dimer Assay, Quantitative: 1867 ng/mL DDU (04.04.20 @ 05:14)  Blood Gas Profile - Arterial (04.04.20 @ 07:04)    pH, Arterial: 7.40    pCO2, Arterial: 52 mmHg    pO2, Arterial: 249 mmHg    HCO3, Arterial: 32 mmoL/L    Base Excess, Arterial: 5.8 mmoL/L    Oxygen Saturation, Arterial: 100 %

## 2020-04-04 NOTE — PROGRESS NOTE ADULT - SUBJECTIVE AND OBJECTIVE BOX
EBONY MARIE  39y  Male      Patient is a 39y old  Male who presents with a chief complaint of Acute hypoxemic respiratory failure (04 Apr 2020 09:25)      INTERVAL HPI/OVERNIGHT EVENTS:  Patient is intubated, day#8, he was hypoxic overnight, s/p suction, increase PEEP and FiO2, now improved.   Vital Signs Last 24 Hrs  T(C): 37.8 (04 Apr 2020 12:00), Max: 37.8 (04 Apr 2020 12:00)  T(F): 100.1 (04 Apr 2020 12:00), Max: 100.1 (04 Apr 2020 12:00)  HR: 96 (04 Apr 2020 12:00) (66 - 101)  BP: 83/53 (04 Apr 2020 12:00) (83/53 - 134/79)  BP(mean): 63 (04 Apr 2020 12:00) (63 - 101)  RR: 24 (04 Apr 2020 12:00) (20 - 25)  SpO2: 98% (04 Apr 2020 12:00) (97% - 100%)      04-03-20 @ 07:01  -  04-04-20 @ 07:00  --------------------------------------------------------  IN: 797.6 mL / OUT: 1000 mL / NET: -202.4 mL    04-04-20 @ 07:01  -  04-04-20 @ 12:39  --------------------------------------------------------  IN: 748 mL / OUT: 155 mL / NET: 593 mL            Consultant(s) Notes Reviewed:  [x ] YES  [ ] NO          MEDICATIONS  (STANDING):  cefepime   IVPB 1000 milliGRAM(s) IV Intermittent every 8 hours  chlorhexidine 0.12% Liquid 15 milliLiter(s) Oral Mucosa two times a day  chlorhexidine 4% Liquid 1 Application(s) Topical two times a day  dexMEDEtomidine Infusion 0.2 MICROgram(s)/kG/Hr (4.89 mL/Hr) IV Continuous <Continuous>  heparin  Infusion 1400 Unit(s)/Hr (14 mL/Hr) IV Continuous <Continuous>  midazolam Infusion 0.02 mG/kG/Hr (1.96 mL/Hr) IV Continuous <Continuous>  morphine  Infusion. 1 mG/Hr (1 mL/Hr) IV Continuous <Continuous>  norepinephrine Infusion 0.05 MICROgram(s)/kG/Min (4.22 mL/Hr) IV Continuous <Continuous>  pantoprazole   Suspension 40 milliGRAM(s) Oral daily  vasopressin Infusion 0.01 Unit(s)/Min (0.6 mL/Hr) IV Continuous <Continuous>  zinc sulfate 220 milliGRAM(s) Oral daily    MEDICATIONS  (PRN):  acetaminophen    Suspension .. 650 milliGRAM(s) Oral every 6 hours PRN Temp greater or equal to 38C (100.4F)  acetaminophen  Suppository .. 650 milliGRAM(s) Rectal every 4 hours PRN Mild Pain (1 - 3)      LABS                          13.4   5.36  )-----------( 307      ( 04 Apr 2020 05:14 )             43.2     04-04    143  |  101  |  17  ----------------------------<  129<H>  5.2<H>   |  29  |  0.7    Ca    8.4<L>      04 Apr 2020 05:14  Mg     2.1     04-04      ABG - ( 04 Apr 2020 07:04 )  pH, Arterial: 7.40  pH, Blood: x     /  pCO2: 52    /  pO2: 249   / HCO3: 32    / Base Excess: 5.8   /  SaO2: 100                 PT/INR - ( 04 Apr 2020 05:14 )   PT: 12.80 sec;   INR: 1.11 ratio         PTT - ( 04 Apr 2020 05:14 )  PTT:68.1 sec  Lactate Trend        CAPILLARY BLOOD GLUCOSE          Culture - Nose (collected 04-02-20 @ 18:43)  Source: .Nose Nose  Preliminary Report (04-03-20 @ 21:21):    Culture in progress    Culture - Urine (collected 03-28-20 @ 16:00)  Source: .Urine Clean Catch (Midstream)  Final Report (03-29-20 @ 20:18):    No growth    Culture - Blood (collected 03-28-20 @ 15:00)  Source: .Blood Blood-Peripheral  Final Report (04-01-20 @ 23:00):    No Growth Final    Culture - Blood (collected 03-28-20 @ 15:00)  Source: .Blood Blood-Peripheral  Final Report (04-01-20 @ 23:00):    No Growth Final        RADIOLOGY & ADDITIONAL TESTS:    Imaging Personally Reviewed:  [ ] YES  [ ] NO    HEALTH ISSUES - PROBLEM Dx:  Fever, unspecified fever cause: Fever, unspecified fever cause  Viral pneumonia: Viral pneumonia        PHYSICAL EXAM:  GENERAL: intubated and ventilated.   HEAD:  Atraumatic, Normocephalic ET tube in place.   EYES: EOMI, PERRLA, conjunctiva and sclera clear  NECK: Supple, No JVD  CHEST/LUNG: ronchi  HEART: Regular rate and rhythm; S1 S2  ABDOMEN: Soft, Nontender, Nondistended; Bowel sounds present  EXTREMITIES:  2+ Peripheral Pulses, No clubbing, cyanosis, or edema  PSYCH: sedated  SKIN: No rashes or lesions

## 2020-04-04 NOTE — PROGRESS NOTE ADULT - ASSESSMENT
39 M w/ PMH of Development delay / Nonverbal / from a group home brought in on 3/23 w/ fever, developed acute RS failure and intubated due to COVID illness     # Acute Hypoxic Respiratory Failure secondary to ARDS and sepsis due to COVID 19 Pneumonia  - still requires high Fio2 100%  - cont with Heparin (d-dimer > 1000)  - c/w cefepime empirically   - s/p Plaquenil and tocilizumab    -  tube feeds   - brother  received update over phone and updated him on pts status and plan all questions answered     DVTppx  GIppx    FULL CODE 39 M w/ PMH of Development delay / Nonverbal / from a group home brought in on 3/23 w/ fever, developed acute RS failure and intubated due to COVID illness     # Acute Hypoxic Respiratory Failure secondary to ARDS and sepsis due to COVID 19 Pneumonia  - decrease FiO2 to 60%  - cont with Heparin (d-dimer > 1000), Ptt 60-90, repeat D-dimer in AM  - c/w cefepime empirically   - s/p Plaquenil and tocilizumab    -  tube feeds      DVTppx  GIppx    FULL CODE

## 2020-04-05 LAB
ALBUMIN SERPL ELPH-MCNC: 2.8 G/DL — LOW (ref 3.5–5.2)
ALP SERPL-CCNC: 212 U/L — HIGH (ref 30–115)
ALT FLD-CCNC: 63 U/L — HIGH (ref 0–41)
ANION GAP SERPL CALC-SCNC: 11 MMOL/L — SIGNIFICANT CHANGE UP (ref 7–14)
APTT BLD: 29 SEC — SIGNIFICANT CHANGE UP (ref 27–39.2)
APTT BLD: 60.4 SEC — HIGH (ref 27–39.2)
AST SERPL-CCNC: 77 U/L — HIGH (ref 0–41)
BASE EXCESS BLDA CALC-SCNC: 6 MMOL/L — HIGH (ref -2–2)
BASOPHILS # BLD AUTO: 0.03 K/UL — SIGNIFICANT CHANGE UP (ref 0–0.2)
BASOPHILS NFR BLD AUTO: 0.5 % — SIGNIFICANT CHANGE UP (ref 0–1)
BILIRUB SERPL-MCNC: 0.8 MG/DL — SIGNIFICANT CHANGE UP (ref 0.2–1.2)
BUN SERPL-MCNC: 21 MG/DL — HIGH (ref 10–20)
CALCIUM SERPL-MCNC: 8.3 MG/DL — LOW (ref 8.5–10.1)
CHLORIDE SERPL-SCNC: 104 MMOL/L — SIGNIFICANT CHANGE UP (ref 98–110)
CO2 SERPL-SCNC: 29 MMOL/L — SIGNIFICANT CHANGE UP (ref 17–32)
CREAT SERPL-MCNC: 0.9 MG/DL — SIGNIFICANT CHANGE UP (ref 0.7–1.5)
D DIMER BLD IA.RAPID-MCNC: 702 NG/ML DDU — HIGH (ref 0–230)
EOSINOPHIL # BLD AUTO: 0.1 K/UL — SIGNIFICANT CHANGE UP (ref 0–0.7)
EOSINOPHIL NFR BLD AUTO: 1.8 % — SIGNIFICANT CHANGE UP (ref 0–8)
GLUCOSE SERPL-MCNC: 130 MG/DL — HIGH (ref 70–99)
HCO3 BLDA-SCNC: 30 MMOL/L — HIGH (ref 23–27)
HCT VFR BLD CALC: 41.8 % — LOW (ref 42–52)
HGB BLD-MCNC: 12.8 G/DL — LOW (ref 14–18)
IMM GRANULOCYTES NFR BLD AUTO: 3.9 % — HIGH (ref 0.1–0.3)
LYMPHOCYTES # BLD AUTO: 0.74 K/UL — LOW (ref 1.2–3.4)
LYMPHOCYTES # BLD AUTO: 13 % — LOW (ref 20.5–51.1)
MCHC RBC-ENTMCNC: 28.3 PG — SIGNIFICANT CHANGE UP (ref 27–31)
MCHC RBC-ENTMCNC: 30.6 G/DL — LOW (ref 32–37)
MCV RBC AUTO: 92.5 FL — SIGNIFICANT CHANGE UP (ref 80–94)
MONOCYTES # BLD AUTO: 0.27 K/UL — SIGNIFICANT CHANGE UP (ref 0.1–0.6)
MONOCYTES NFR BLD AUTO: 4.7 % — SIGNIFICANT CHANGE UP (ref 1.7–9.3)
NEUTROPHILS # BLD AUTO: 4.35 K/UL — SIGNIFICANT CHANGE UP (ref 1.4–6.5)
NEUTROPHILS NFR BLD AUTO: 76.1 % — HIGH (ref 42.2–75.2)
NRBC # BLD: 1 /100 WBCS — HIGH (ref 0–0)
PCO2 BLDA: 41 MMHG — SIGNIFICANT CHANGE UP (ref 38–42)
PH BLDA: 7.47 — HIGH (ref 7.38–7.42)
PLATELET # BLD AUTO: 339 K/UL — SIGNIFICANT CHANGE UP (ref 130–400)
PO2 BLDA: 84 MMHG — SIGNIFICANT CHANGE UP (ref 78–95)
POTASSIUM SERPL-MCNC: 4.8 MMOL/L — SIGNIFICANT CHANGE UP (ref 3.5–5)
POTASSIUM SERPL-SCNC: 4.8 MMOL/L — SIGNIFICANT CHANGE UP (ref 3.5–5)
PROT SERPL-MCNC: 5.7 G/DL — LOW (ref 6–8)
RBC # BLD: 4.52 M/UL — LOW (ref 4.7–6.1)
RBC # FLD: 16.7 % — HIGH (ref 11.5–14.5)
SAO2 % BLDA: 97 % — SIGNIFICANT CHANGE UP (ref 94–98)
SODIUM SERPL-SCNC: 144 MMOL/L — SIGNIFICANT CHANGE UP (ref 135–146)
WBC # BLD: 5.71 K/UL — SIGNIFICANT CHANGE UP (ref 4.8–10.8)
WBC # FLD AUTO: 5.71 K/UL — SIGNIFICANT CHANGE UP (ref 4.8–10.8)

## 2020-04-05 PROCEDURE — 71045 X-RAY EXAM CHEST 1 VIEW: CPT | Mod: 26

## 2020-04-05 PROCEDURE — 99291 CRITICAL CARE FIRST HOUR: CPT

## 2020-04-05 PROCEDURE — 71045 X-RAY EXAM CHEST 1 VIEW: CPT | Mod: 26,77

## 2020-04-05 RX ORDER — ACETAMINOPHEN 500 MG
650 TABLET ORAL EVERY 6 HOURS
Refills: 0 | Status: DISCONTINUED | OUTPATIENT
Start: 2020-04-05 | End: 2020-04-08

## 2020-04-05 RX ADMIN — CEFEPIME 100 MILLIGRAM(S): 1 INJECTION, POWDER, FOR SOLUTION INTRAMUSCULAR; INTRAVENOUS at 05:27

## 2020-04-05 RX ADMIN — ZINC SULFATE TAB 220 MG (50 MG ZINC EQUIVALENT) 220 MILLIGRAM(S): 220 (50 ZN) TAB at 12:01

## 2020-04-05 RX ADMIN — CHLORHEXIDINE GLUCONATE 1 APPLICATION(S): 213 SOLUTION TOPICAL at 16:02

## 2020-04-05 RX ADMIN — CHLORHEXIDINE GLUCONATE 15 MILLILITER(S): 213 SOLUTION TOPICAL at 16:03

## 2020-04-05 RX ADMIN — Medication 650 MILLIGRAM(S): at 12:01

## 2020-04-05 RX ADMIN — CEFEPIME 100 MILLIGRAM(S): 1 INJECTION, POWDER, FOR SOLUTION INTRAMUSCULAR; INTRAVENOUS at 12:01

## 2020-04-05 RX ADMIN — Medication 650 MILLIGRAM(S): at 20:15

## 2020-04-05 RX ADMIN — CEFEPIME 100 MILLIGRAM(S): 1 INJECTION, POWDER, FOR SOLUTION INTRAMUSCULAR; INTRAVENOUS at 20:51

## 2020-04-05 RX ADMIN — CHLORHEXIDINE GLUCONATE 15 MILLILITER(S): 213 SOLUTION TOPICAL at 05:27

## 2020-04-05 RX ADMIN — PANTOPRAZOLE SODIUM 40 MILLIGRAM(S): 20 TABLET, DELAYED RELEASE ORAL at 12:01

## 2020-04-05 RX ADMIN — CHLORHEXIDINE GLUCONATE 1 APPLICATION(S): 213 SOLUTION TOPICAL at 05:28

## 2020-04-05 NOTE — PROGRESS NOTE ADULT - ASSESSMENT
Patient is a 39 M w/ PMH of Development delay / Nonverbal / from a group home brought in on 3/23 w/ fever, developed acute RS failure and intubated due to COVID illness     # Acute Hypoxic Respiratory Failure secondary to ARDS and sepsis due to COVID 19 Pneumonia  - Temp 101, Tachycardia, SBP High 90's  - Mechanical Ventilaltion Decrease FiO2 to 30% PEEP 15   - Cont with Heparin (d-dimer > 1000), DDimer improved - Follow Ptt 60-90,   - Blood Culture Ordered  - Cefepime empirically   - s/p Plaquenil and tocilizumab      Diet- Tube Feeds  DVT ppx: Lovenox 40  GI ppx: protonix  Activity: bed rest  Dispo: Acute - mEechanical Ventilation FiO2 30%, PEEp 15 - Improving - Spoke with Brother - Anesthesiologist - Lamberto Rowell [phone]  FULL CODE

## 2020-04-05 NOTE — PROGRESS NOTE ADULT - SUBJECTIVE AND OBJECTIVE BOX
*This is a Preliminary Note and will be edited*  SUBJECTIVE:  Patient is a 39y old Male who presents with a chief complaint of Acute hypoxemic respiratory failure (04 Apr 2020 18:17)     Currently admitted to medicine with the primary diagnosis of Respiratory distress     Today is hospital day 8d. This morning he is resting comfortably in bed and reports no new** issues or overnight events.   Patient denies Headache, Chest pain, Abdominal Pain or discomfort.**  Patient reports urinating and stooling appropriately.**    PAST MEDICAL & SURGICAL HISTORY  Mentally challenged  High blood cholesterol  No significant past surgical history    SOCIAL HISTORY:  Negative for smoking/alcohol/drug use.     ALLERGIES:  No Known Allergies    MEDICATIONS:  STANDING MEDICATIONS  acetaminophen    Suspension .. 650 milliGRAM(s) Oral every 6 hours PRN Temp greater or equal to 38C (100.4F)  acetaminophen   Tablet .. 650 milliGRAM(s) Oral every 6 hours PRN Temp greater or equal to 38.5C (101.3F)  cefepime   IVPB 1000 milliGRAM(s) IV Intermittent every 8 hours  chlorhexidine 0.12% Liquid 15 milliLiter(s) Oral Mucosa two times a day  chlorhexidine 4% Liquid 1 Application(s) Topical <User Schedule>  chlorhexidine 4% Liquid 1 Application(s) Topical two times a day  dexMEDEtomidine Infusion 0.2 MICROgram(s)/kG/Hr (4.89 mL/Hr) IV Continuous <Continuous>  heparin  Infusion 1400 Unit(s)/Hr (14 mL/Hr) IV Continuous <Continuous>  midazolam Infusion 0.02 mG/kG/Hr (1.96 mL/Hr) IV Continuous <Continuous>  morphine  Infusion. 1 mG/Hr (1 mL/Hr) IV Continuous <Continuous>  norepinephrine Infusion 0.05 MICROgram(s)/kG/Min (4.22 mL/Hr) IV Continuous <Continuous>  pantoprazole   Suspension 40 milliGRAM(s) Oral daily  vasopressin Infusion 0.01 Unit(s)/Min (0.6 mL/Hr) IV Continuous <Continuous>  vasopressin Infusion 0.05 Unit(s)/Min (3 mL/Hr) IV Continuous <Continuous>  zinc sulfate 220 milliGRAM(s) Oral daily    PRN MEDICATIONS  acetaminophen    Suspension .. 650 milliGRAM(s) Oral every 6 hours PRN  acetaminophen   Tablet .. 650 milliGRAM(s) Oral every 6 hours PRN    VITALS:   T(F): 101  HR: 103 (103 - 108)  BP: 96/56 (86/53 - 102/60)  RR: 22 (22 - 22)  SpO2: 97% (95% - 99%)    LABS:                        12.8   5.71  )-----------( 339      ( 05 Apr 2020 05:27 )             41.8     04-05    144  |  104  |  21<H>  ----------------------------<  130<H>  4.8   |  29  |  0.9    Ca    8.3<L>      05 Apr 2020 05:27  Mg     2.1     04-04    TPro  5.7<L>  /  Alb  2.8<L>  /  TBili  0.8  /  DBili  x   /  AST  77<H>  /  ALT  63<H>  /  AlkPhos  212<H>  04-05    PT/INR - ( 04 Apr 2020 05:14 )   PT: 12.80 sec;   INR: 1.11 ratio         PTT - ( 05 Apr 2020 05:27 )  PTT:60.4 sec    ABG - ( 05 Apr 2020 03:01 )  pH, Arterial: 7.47  pH, Blood: x     /  pCO2: 41    /  pO2: 84    / HCO3: 30    / Base Excess: 6.0   /  SaO2: 97                    Culture - Nose (collected 02 Apr 2020 18:43)  Source: .Nose Nose  Final Report (04 Apr 2020 21:40):    No MRSA isolated    No Staph Aureus (MSSA) isolated "This can represent normal nasal    carriage.  PCR is more sensitive for identifying MRSA/MSSA carriage"        RADIOLOGY:  < from: Xray Chest 1 View-PORTABLE IMMEDIATE (04.05.20 @ 14:00) >  Impression:    Bilateral lower lung predominant opacities.  Lines and support devices as described above.There is an enteric tube which is incompletely visualized distally.  < end of copied text >    PHYSICAL EXAM: To be updated   GEN: In no acute distress. Patient is awake in bed able to have a conversation.  LUNGS: CTABL, Symmetrical inspiration, no increased work of breathing  HEART: +S1,S2, regular rate and rhythm, No murmurs, Rubs, Gallops appreciated  ABD: Bowel Sounds Present, Soft, non tender, non distended, no guarding, no rebound.   EXT: 2+ peripheral Pulses, no clubbing, no cyanosis, no Edema.   NEURO: Alert and Oriented X3. No focal deficits Appreciated. Cranial Nerves 2-12 Grossly intact.    \ SUBJECTIVE:  Patient is a 39y old Male who presents with a chief complaint of Acute hypoxemic respiratory failure (04 Apr 2020 18:17)     Currently admitted to medicine with the primary diagnosis of Respiratory distress     Today is hospital day 8d. Sedated and Intubated.  Kuhn In Place    PAST MEDICAL & SURGICAL HISTORY  Mentally challenged  High blood cholesterol  No significant past surgical history    SOCIAL HISTORY:  Negative for smoking/alcohol/drug use.     ALLERGIES:  No Known Allergies    MEDICATIONS:  STANDING MEDICATIONS  acetaminophen    Suspension .. 650 milliGRAM(s) Oral every 6 hours PRN Temp greater or equal to 38C (100.4F)  acetaminophen   Tablet .. 650 milliGRAM(s) Oral every 6 hours PRN Temp greater or equal to 38.5C (101.3F)  cefepime   IVPB 1000 milliGRAM(s) IV Intermittent every 8 hours  chlorhexidine 0.12% Liquid 15 milliLiter(s) Oral Mucosa two times a day  chlorhexidine 4% Liquid 1 Application(s) Topical <User Schedule>  chlorhexidine 4% Liquid 1 Application(s) Topical two times a day  dexMEDEtomidine Infusion 0.2 MICROgram(s)/kG/Hr (4.89 mL/Hr) IV Continuous <Continuous>  heparin  Infusion 1400 Unit(s)/Hr (14 mL/Hr) IV Continuous <Continuous>  midazolam Infusion 0.02 mG/kG/Hr (1.96 mL/Hr) IV Continuous <Continuous>  morphine  Infusion. 1 mG/Hr (1 mL/Hr) IV Continuous <Continuous>  norepinephrine Infusion 0.05 MICROgram(s)/kG/Min (4.22 mL/Hr) IV Continuous <Continuous>  pantoprazole   Suspension 40 milliGRAM(s) Oral daily  vasopressin Infusion 0.01 Unit(s)/Min (0.6 mL/Hr) IV Continuous <Continuous>  vasopressin Infusion 0.05 Unit(s)/Min (3 mL/Hr) IV Continuous <Continuous>  zinc sulfate 220 milliGRAM(s) Oral daily    PRN MEDICATIONS  acetaminophen    Suspension .. 650 milliGRAM(s) Oral every 6 hours PRN  acetaminophen   Tablet .. 650 milliGRAM(s) Oral every 6 hours PRN    VITALS:   T(F): 101  HR: 103 (103 - 108)  BP: 96/56 (86/53 - 102/60)  RR: 22 (22 - 22)  SpO2: 97% (95% - 99%)    LABS:                        12.8   5.71  )-----------( 339      ( 05 Apr 2020 05:27 )             41.8     04-05    144  |  104  |  21<H>  ----------------------------<  130<H>  4.8   |  29  |  0.9    Ca    8.3<L>      05 Apr 2020 05:27  Mg     2.1     04-04    TPro  5.7<L>  /  Alb  2.8<L>  /  TBili  0.8  /  DBili  x   /  AST  77<H>  /  ALT  63<H>  /  AlkPhos  212<H>  04-05    PT/INR - ( 04 Apr 2020 05:14 )   PT: 12.80 sec;   INR: 1.11 ratio         PTT - ( 05 Apr 2020 05:27 )  PTT:60.4 sec    ABG - ( 05 Apr 2020 03:01 )  pH, Arterial: 7.47  pH, Blood: x     /  pCO2: 41    /  pO2: 84    / HCO3: 30    / Base Excess: 6.0   /  SaO2: 97        Culture - Nose (collected 02 Apr 2020 18:43)  Source: .Nose Nose  Final Report (04 Apr 2020 21:40):    No MRSA isolated    No Staph Aureus (MSSA) isolated "This can represent normal nasal    carriage.  PCR is more sensitive for identifying MRSA/MSSA carriage"    RADIOLOGY:  < from: Xray Chest 1 View-PORTABLE IMMEDIATE (04.05.20 @ 14:00) >  Impression:    Bilateral lower lung predominant opacities.  Lines and support devices as described above.There is an enteric tube which is incompletely visualized distally.  < end of copied text >    PHYSICAL EXAM:  GEN: In no acute distress. Patient is sedated Intubated  LUNGS: Breath Sounds, Coarse B/L   HEART: +S1,S2, regular rate and rhythm, No murmurs, Rubs, Gallops appreciated  ABD: Bowel Sounds Present, Non tender  EXT: 2+ peripheral Pulses, no clubbing, no cyanosis, Edema Present.   NEURO: Sedated - Shuts eyes to active Opening.

## 2020-04-05 NOTE — PROGRESS NOTE ADULT - SUBJECTIVE AND OBJECTIVE BOX
38 yo M with PMHx of developmental delay (nonverbal at baseline), HLD who presents from [facility] for sob. Per chart review, pt was seen in the ED on 3/23 for fever and decreased po intake. Labs at that time were notable for WBC 2, AST/ALT 46/71, negative blood cx x2, cxr with bilateral opacities. Pt tolerated po and was d/c'ed back to . Pt returned on 3/24 with fever, cough, and vomiting episodes after coughing. UA/urine cx negative, CT abd negative for acute abd pathology but showed RLL ground glass opacity. Pt was d/c'ed back to  and instructed to quarantine for possible COVID. Pt then returned to ED today with worsening sob over past several days.    In ED, HR 100s, rectal temp 101, normotensive, tachypneic 30s-40s, satting 76% on RA. SpO2 improved to 91% on NRB but pt still tachypneic and not tolerating NRB well so was intubated and started on precedex. Labs notable for WBC 2, AST 42, , trop <0.01. UA negative. Cxr with increased bilateral infiltrates. Pending COVID results.     Upon admission, pt was intubated due to refractory hypoxia and tachypnia    Sedated with Versed and Morphine gtt  Levophed gtt, Heparin gtt    Vital Signs Last 24 Hrs  T(C): 38.1 (05 Apr 2020 16:00), Max: 38.3 (05 Apr 2020 12:00)  T(F): 100.5 (05 Apr 2020 16:00), Max: 101 (05 Apr 2020 12:00)  HR: 97 (05 Apr 2020 16:00) (97 - 108)  BP: 99/58 (05 Apr 2020 16:00) (86/53 - 99/58)  BP(mean): 73 (05 Apr 2020 16:00) (54 - 73)  RR: 22 (05 Apr 2020 16:00) (22 - 22)  SpO2: 98% (05 Apr 2020 16:30) (95% - 99%)    Intubated- PEEP 15, FiO2 45%    WBC 5.7  Cr 0.9  7.47/41/84    CXR - bibasilar opacites    a/p:    PNA due to SARS-CoV2 infection  ARDS  Acute Hypoxic Resp failure    Advance ET tube by 3 cm  HCQ finished (received Tocilizumab)  D/C cefepime after today  trend LFTs, adjust tylenol  Cont heparin gtt  GI proph  Feeds with glycemic control      40 minutes of critical care time spent providing medical care for patient's acute illness/conditions that impairs at least one vital organ system and/or poses a high risk of imminent or life threatening deterioration in the patient's condition. It includes time spent evaluating and treating the patient's acute illness as well as time spent reviewing labs, radiology, discussing goals of care with patient and/or patient's family, and discussing the case with a multidisciplinary team in an effort to prevent further life threatening deterioration or end organ damage. This time is independent of any procedures performed.

## 2020-04-06 LAB
ALBUMIN SERPL ELPH-MCNC: 2.7 G/DL — LOW (ref 3.5–5.2)
ALP SERPL-CCNC: 192 U/L — HIGH (ref 30–115)
ALT FLD-CCNC: 68 U/L — HIGH (ref 0–41)
ANION GAP SERPL CALC-SCNC: 9 MMOL/L — SIGNIFICANT CHANGE UP (ref 7–14)
ANISOCYTOSIS BLD QL: SLIGHT — SIGNIFICANT CHANGE UP
APTT BLD: 31.4 SEC — SIGNIFICANT CHANGE UP (ref 27–39.2)
AST SERPL-CCNC: 64 U/L — HIGH (ref 0–41)
BASE EXCESS BLDA CALC-SCNC: 5.9 MMOL/L — HIGH (ref -2–2)
BASOPHILS # BLD AUTO: 0 K/UL — SIGNIFICANT CHANGE UP (ref 0–0.2)
BASOPHILS NFR BLD AUTO: 0 % — SIGNIFICANT CHANGE UP (ref 0–1)
BILIRUB SERPL-MCNC: 0.8 MG/DL — SIGNIFICANT CHANGE UP (ref 0.2–1.2)
BUN SERPL-MCNC: 21 MG/DL — HIGH (ref 10–20)
BURR CELLS BLD QL SMEAR: PRESENT — SIGNIFICANT CHANGE UP
CALCIUM SERPL-MCNC: 8.3 MG/DL — LOW (ref 8.5–10.1)
CHLORIDE SERPL-SCNC: 104 MMOL/L — SIGNIFICANT CHANGE UP (ref 98–110)
CO2 SERPL-SCNC: 30 MMOL/L — SIGNIFICANT CHANGE UP (ref 17–32)
CREAT SERPL-MCNC: 0.9 MG/DL — SIGNIFICANT CHANGE UP (ref 0.7–1.5)
EOSINOPHIL # BLD AUTO: 0 K/UL — SIGNIFICANT CHANGE UP (ref 0–0.7)
EOSINOPHIL NFR BLD AUTO: 0 % — SIGNIFICANT CHANGE UP (ref 0–8)
GAS PNL BLDA: SIGNIFICANT CHANGE UP
GIANT PLATELETS BLD QL SMEAR: PRESENT — SIGNIFICANT CHANGE UP
GLUCOSE SERPL-MCNC: 133 MG/DL — HIGH (ref 70–99)
HCO3 BLDA-SCNC: 32 MMOL/L — HIGH (ref 23–27)
HCT VFR BLD CALC: 39.6 % — LOW (ref 42–52)
HGB BLD-MCNC: 12.2 G/DL — LOW (ref 14–18)
LYMPHOCYTES # BLD AUTO: 0.6 K/UL — LOW (ref 1.2–3.4)
LYMPHOCYTES # BLD AUTO: 7.9 % — LOW (ref 20.5–51.1)
MAGNESIUM SERPL-MCNC: 2.2 MG/DL — SIGNIFICANT CHANGE UP (ref 1.8–2.4)
MANUAL SMEAR VERIFICATION: SIGNIFICANT CHANGE UP
MCHC RBC-ENTMCNC: 29 PG — SIGNIFICANT CHANGE UP (ref 27–31)
MCHC RBC-ENTMCNC: 30.8 G/DL — LOW (ref 32–37)
MCV RBC AUTO: 94.1 FL — HIGH (ref 80–94)
METAMYELOCYTES # FLD: 1.8 % — HIGH (ref 0–0)
MICROCYTES BLD QL: SLIGHT — SIGNIFICANT CHANGE UP
MONOCYTES # BLD AUTO: 0.6 K/UL — SIGNIFICANT CHANGE UP (ref 0.1–0.6)
MONOCYTES NFR BLD AUTO: 7.9 % — SIGNIFICANT CHANGE UP (ref 1.7–9.3)
MYELOCYTES NFR BLD: 1.7 % — HIGH (ref 0–0)
NEUTROPHILS # BLD AUTO: 6.14 K/UL — SIGNIFICANT CHANGE UP (ref 1.4–6.5)
NEUTROPHILS NFR BLD AUTO: 72.8 % — SIGNIFICANT CHANGE UP (ref 42.2–75.2)
NEUTS BAND # BLD: 7.9 % — HIGH (ref 0–6)
NRBC # BLD: 1 /100 — HIGH (ref 0–0)
NRBC # BLD: SIGNIFICANT CHANGE UP /100 WBCS (ref 0–0)
OVALOCYTES BLD QL SMEAR: SLIGHT — SIGNIFICANT CHANGE UP
PCO2 BLDA: 53 MMHG — HIGH (ref 38–42)
PH BLDA: 7.39 — SIGNIFICANT CHANGE UP (ref 7.38–7.42)
PLAT MORPH BLD: NORMAL — SIGNIFICANT CHANGE UP
PLATELET # BLD AUTO: 261 K/UL — SIGNIFICANT CHANGE UP (ref 130–400)
PO2 BLDA: 79 MMHG — SIGNIFICANT CHANGE UP (ref 78–95)
POIKILOCYTOSIS BLD QL AUTO: SIGNIFICANT CHANGE UP
POLYCHROMASIA BLD QL SMEAR: SIGNIFICANT CHANGE UP
POTASSIUM SERPL-MCNC: 4.9 MMOL/L — SIGNIFICANT CHANGE UP (ref 3.5–5)
POTASSIUM SERPL-SCNC: 4.9 MMOL/L — SIGNIFICANT CHANGE UP (ref 3.5–5)
PROT SERPL-MCNC: 5.5 G/DL — LOW (ref 6–8)
RBC # BLD: 4.21 M/UL — LOW (ref 4.7–6.1)
RBC # FLD: 17.2 % — HIGH (ref 11.5–14.5)
RBC BLD AUTO: ABNORMAL
SAO2 % BLDA: 96 % — SIGNIFICANT CHANGE UP (ref 94–98)
SODIUM SERPL-SCNC: 143 MMOL/L — SIGNIFICANT CHANGE UP (ref 135–146)
WBC # BLD: 7.61 K/UL — SIGNIFICANT CHANGE UP (ref 4.8–10.8)
WBC # FLD AUTO: 7.61 K/UL — SIGNIFICANT CHANGE UP (ref 4.8–10.8)

## 2020-04-06 PROCEDURE — 71045 X-RAY EXAM CHEST 1 VIEW: CPT | Mod: 26

## 2020-04-06 RX ORDER — PROPOFOL 10 MG/ML
5 INJECTION, EMULSION INTRAVENOUS
Qty: 1000 | Refills: 0 | Status: DISCONTINUED | OUTPATIENT
Start: 2020-04-06 | End: 2020-04-07

## 2020-04-06 RX ORDER — DEXMEDETOMIDINE HYDROCHLORIDE IN 0.9% SODIUM CHLORIDE 4 UG/ML
0.2 INJECTION INTRAVENOUS
Qty: 200 | Refills: 0 | Status: DISCONTINUED | OUTPATIENT
Start: 2020-04-06 | End: 2020-04-08

## 2020-04-06 RX ORDER — HEPARIN SODIUM 5000 [USP'U]/ML
2100 INJECTION INTRAVENOUS; SUBCUTANEOUS
Qty: 25000 | Refills: 0 | Status: DISCONTINUED | OUTPATIENT
Start: 2020-04-06 | End: 2020-04-06

## 2020-04-06 RX ORDER — HEPARIN SODIUM 5000 [USP'U]/ML
5000 INJECTION INTRAVENOUS; SUBCUTANEOUS EVERY 8 HOURS
Refills: 0 | Status: DISCONTINUED | OUTPATIENT
Start: 2020-04-06 | End: 2020-04-08

## 2020-04-06 RX ADMIN — CHLORHEXIDINE GLUCONATE 15 MILLILITER(S): 213 SOLUTION TOPICAL at 07:20

## 2020-04-06 RX ADMIN — Medication 650 MILLIGRAM(S): at 21:48

## 2020-04-06 RX ADMIN — CEFEPIME 100 MILLIGRAM(S): 1 INJECTION, POWDER, FOR SOLUTION INTRAMUSCULAR; INTRAVENOUS at 07:20

## 2020-04-06 RX ADMIN — CHLORHEXIDINE GLUCONATE 15 MILLILITER(S): 213 SOLUTION TOPICAL at 17:19

## 2020-04-06 RX ADMIN — CEFEPIME 100 MILLIGRAM(S): 1 INJECTION, POWDER, FOR SOLUTION INTRAMUSCULAR; INTRAVENOUS at 14:23

## 2020-04-06 RX ADMIN — CEFEPIME 100 MILLIGRAM(S): 1 INJECTION, POWDER, FOR SOLUTION INTRAMUSCULAR; INTRAVENOUS at 21:48

## 2020-04-06 RX ADMIN — ZINC SULFATE TAB 220 MG (50 MG ZINC EQUIVALENT) 220 MILLIGRAM(S): 220 (50 ZN) TAB at 11:44

## 2020-04-06 RX ADMIN — DEXMEDETOMIDINE HYDROCHLORIDE IN 0.9% SODIUM CHLORIDE 4.89 MICROGRAM(S)/KG/HR: 4 INJECTION INTRAVENOUS at 15:52

## 2020-04-06 RX ADMIN — Medication 650 MILLIGRAM(S): at 14:48

## 2020-04-06 RX ADMIN — Medication 650 MILLIGRAM(S): at 03:30

## 2020-04-06 RX ADMIN — HEPARIN SODIUM 5000 UNIT(S): 5000 INJECTION INTRAVENOUS; SUBCUTANEOUS at 21:48

## 2020-04-06 RX ADMIN — PANTOPRAZOLE SODIUM 40 MILLIGRAM(S): 20 TABLET, DELAYED RELEASE ORAL at 11:44

## 2020-04-06 RX ADMIN — PROPOFOL 2.93 MICROGRAM(S)/KG/MIN: 10 INJECTION, EMULSION INTRAVENOUS at 18:26

## 2020-04-06 RX ADMIN — CHLORHEXIDINE GLUCONATE 1 APPLICATION(S): 213 SOLUTION TOPICAL at 07:20

## 2020-04-06 RX ADMIN — HEPARIN SODIUM 5000 UNIT(S): 5000 INJECTION INTRAVENOUS; SUBCUTANEOUS at 14:27

## 2020-04-06 NOTE — PROCEDURE NOTE - NSPROCDETAILS_GEN_ALL_CORE
positive blood return obtained via catheter/connected to a pressurized flush line/all materials/supplies accounted for at end of procedure/Seldinger technique/sutured in place/ultrasound guidance/location identified, draped/prepped, sterile technique used, needle inserted/introduced
guidewire recovered/lumen(s) aspirated and flushed/sterile technique, catheter placed/sterile dressing applied/ultrasound guidance

## 2020-04-06 NOTE — PROGRESS NOTE ADULT - SUBJECTIVE AND OBJECTIVE BOX
Patient is a 39y old  Male who presents with a chief complaint of Acute hypoxemic respiratory failure (05 Apr 2020 20:25)        Over Night Events: Remains Critically ill and on MV ; Febrile overnight     ROS:  See HPI    PHYSICAL EXAM    ICU Vital Signs Last 24 Hrs  T(C): 38.1 (06 Apr 2020 06:00), Max: 40.1 (05 Apr 2020 20:00)  T(F): 100.6 (06 Apr 2020 06:00), Max: 104.1 (05 Apr 2020 20:00)  HR: 118 (06 Apr 2020 06:00) (86 - 118)  BP: 94/58 (06 Apr 2020 06:00) (91/53 - 99/58)  BP(mean): 69 (06 Apr 2020 06:00) (65 - 73)  ABP: --  ABP(mean): --  RR: 22 (06 Apr 2020 06:00) (22 - 22)      General: Intubated and sedated  HEENT: TIFFANIE , ET+            Lungs: b/l crackles +  Cardiovascular: Regular   Gastrointestinal: Soft, Positive BS  Extremities: No clubbing.    Skin: Warm  Neurological: sedated; non verbal at baseline      04-05-20 @ 07:01  -  04-06-20 @ 07:00  --------------------------------------------------------  IN:    Enteral Tube Flush: 410 mL    heparin Infusion: 338 mL    IV PiggyBack: 100 mL    midazolam Infusion: 66 mL    morphine  Infusion.: 44 mL    norepinephrine Infusion: 371.6 mL    Peptamen A.F.: 1080 mL  Total IN: 2409.6 mL    OUT:    Indwelling Catheter - Urethral: 1680 mL  Total OUT: 1680 mL    Total NET: 729.6 mL          LABS:                            12.2   7.61  )-----------( 261      ( 06 Apr 2020 04:07 )             39.6                                               04-06             12.2   7.61  )-----------( 261      ( 04-06 @ 04:07 )             39.6                12.8   5.71  )-----------( 339      ( 04-05 @ 05:27 )             41.8                13.4   5.36  )-----------( 307      ( 04-04 @ 05:14 )             43.2                13.5   4.66  )-----------( 265      ( 04-03 @ 08:21 )             44.1       143  |  104  |  21<H>  ----------------------------<  133<H>  4.9   |  30  |  0.9    06 Apr 2020 04:07    143    |  104    |  21<H>  ----------------------------<  133<H>  4.9     |  30     |  0.9    05 Apr 2020 05:27    144    |  104    |  21<H>  ----------------------------<  130<H>  4.8     |  29     |  0.9      Ca    8.3<L>      06 Apr 2020 04:07  Ca    8.3<L>      05 Apr 2020 05:27  Mg     2.2       06 Apr 2020 04:07    TPro  5.5<L>  /  Alb  2.7<L>  /  TBili  0.8    /  DBili  x      /  AST  64<H>  /  ALT  68<H>  /  AlkPhos  192<H>  06 Apr 2020 04:07  TPro  5.7<L>  /  Alb  2.8<L>  /  TBili  0.8    /  DBili  x      /  AST  77<H>  /  ALT  63<H>  /  AlkPhos  212<H>  05 Apr 2020 05:27    Ca    8.3<L>      06 Apr 2020 04:07  Mg     2.2     04-06    TPro  5.5<L>  /  Alb  2.7<L>  /  TBili  0.8  /  DBili  x   /  AST  64<H>  /  ALT  68<H>  /  AlkPhos  192<H>  04-06      PTT - ( 06 Apr 2020 04:07 )  PTT:31.4 sec                                                                                     LIVER FUNCTIONS - ( 06 Apr 2020 04:07 )  Alb: 2.7 g/dL / Pro: 5.5 g/dL / ALK PHOS: 192 U/L / ALT: 68 U/L / AST: 64 U/L / GGT: x                                                                                               Mode: AC/ CMV (Assist Control/ Continuous Mandatory Ventilation)  RR (machine): 22  FiO2: 45  PEEP: 15  PC: 30  PIP: 30                                      ABG - ( 06 Apr 2020 02:35 )  pH, Arterial: 7.39  pH, Blood: x     /  pCO2: 53    /  pO2: 79    / HCO3: 32    / Base Excess: 5.9   /  SaO2: 96                  MEDICATIONS  (STANDING):  cefepime   IVPB 1000 milliGRAM(s) IV Intermittent every 8 hours  chlorhexidine 0.12% Liquid 15 milliLiter(s) Oral Mucosa two times a day  chlorhexidine 4% Liquid 1 Application(s) Topical <User Schedule>  chlorhexidine 4% Liquid 1 Application(s) Topical two times a day  dexMEDEtomidine Infusion 0.2 MICROgram(s)/kG/Hr (4.89 mL/Hr) IV Continuous <Continuous>  heparin  Infusion 1400 Unit(s)/Hr (17 mL/Hr) IV Continuous <Continuous>  midazolam Infusion 0.02 mG/kG/Hr (1.96 mL/Hr) IV Continuous <Continuous>  morphine  Infusion. 1 mG/Hr (1 mL/Hr) IV Continuous <Continuous>  norepinephrine Infusion 0.05 MICROgram(s)/kG/Min (4.22 mL/Hr) IV Continuous <Continuous>  pantoprazole   Suspension 40 milliGRAM(s) Oral daily  vasopressin Infusion 0.01 Unit(s)/Min (0.6 mL/Hr) IV Continuous <Continuous>  vasopressin Infusion 0.05 Unit(s)/Min (3 mL/Hr) IV Continuous <Continuous>  zinc sulfate 220 milliGRAM(s) Oral daily    MEDICATIONS  (PRN):  acetaminophen    Suspension .. 650 milliGRAM(s) Oral every 6 hours PRN Temp greater or equal to 38C (100.4F)  acetaminophen   Tablet .. 650 milliGRAM(s) Oral every 6 hours PRN Temp greater or equal to 38.5C (101.3F)      Xrays:        CXR reviewed (My interpretation)- ET okay , OG okay , TLC okay . B/l lower lobe infiltrates . F/u official read                                                                               ECHO Not done Patient is a 39y old  Male who presents with a chief complaint of Acute hypoxemic respiratory failure (05 Apr 2020 20:25)        Over Night Events: Remains Critically ill and on MV ; Febrile overnight     ROS:  See HPI    PHYSICAL EXAM    ICU Vital Signs Last 24 Hrs  T(C): 38.1 (06 Apr 2020 06:00), Max: 40.1 (05 Apr 2020 20:00)  T(F): 100.6 (06 Apr 2020 06:00), Max: 104.1 (05 Apr 2020 20:00)  HR: 118 (06 Apr 2020 06:00) (86 - 118)  BP: 94/58 (06 Apr 2020 06:00) (91/53 - 99/58)  BP(mean): 69 (06 Apr 2020 06:00) (65 - 73)  ABP: --  ABP(mean): --  RR: 22 (06 Apr 2020 06:00) (22 - 22)      General: Intubated and sedated  HEENT: TIFFANIE , ET+            Lungs: b/l crackles +  Cardiovascular: Regular   Gastrointestinal: Soft, Positive BS  Extremities: No clubbing.    Skin: Warm  Neurological: sedated; non verbal at baseline      04-05-20 @ 07:01  -  04-06-20 @ 07:00  --------------------------------------------------------  IN:    Enteral Tube Flush: 410 mL    heparin Infusion: 338 mL    IV PiggyBack: 100 mL    midazolam Infusion: 66 mL    morphine  Infusion.: 44 mL    norepinephrine Infusion: 371.6 mL    Peptamen A.F.: 1080 mL  Total IN: 2409.6 mL    OUT:    Indwelling Catheter - Urethral: 1680 mL  Total OUT: 1680 mL    Total NET: 729.6 mL          LABS:                            12.2   7.61  )-----------( 261      ( 06 Apr 2020 04:07 )             39.6                                               04-06             12.2   7.61  )-----------( 261      ( 04-06 @ 04:07 )             39.6                12.8   5.71  )-----------( 339      ( 04-05 @ 05:27 )             41.8                13.4   5.36  )-----------( 307      ( 04-04 @ 05:14 )             43.2                13.5   4.66  )-----------( 265      ( 04-03 @ 08:21 )             44.1       143  |  104  |  21<H>  ----------------------------<  133<H>  4.9   |  30  |  0.9    06 Apr 2020 04:07    143    |  104    |  21<H>  ----------------------------<  133<H>  4.9     |  30     |  0.9    05 Apr 2020 05:27    144    |  104    |  21<H>  ----------------------------<  130<H>  4.8     |  29     |  0.9      Ca    8.3<L>      06 Apr 2020 04:07  Ca    8.3<L>      05 Apr 2020 05:27  Mg     2.2       06 Apr 2020 04:07    TPro  5.5<L>  /  Alb  2.7<L>  /  TBili  0.8    /  DBili  x      /  AST  64<H>  /  ALT  68<H>  /  AlkPhos  192<H>  06 Apr 2020 04:07  TPro  5.7<L>  /  Alb  2.8<L>  /  TBili  0.8    /  DBili  x      /  AST  77<H>  /  ALT  63<H>  /  AlkPhos  212<H>  05 Apr 2020 05:27    Ca    8.3<L>      06 Apr 2020 04:07  Mg     2.2     04-06    TPro  5.5<L>  /  Alb  2.7<L>  /  TBili  0.8  /  DBili  x   /  AST  64<H>  /  ALT  68<H>  /  AlkPhos  192<H>  04-06      PTT - ( 06 Apr 2020 04:07 )  PTT:31.4 sec                                                                                     LIVER FUNCTIONS - ( 06 Apr 2020 04:07 )  Alb: 2.7 g/dL / Pro: 5.5 g/dL / ALK PHOS: 192 U/L / ALT: 68 U/L / AST: 64 U/L / GGT: x                                                                                               Mode: AC/ CMV (Assist Control/ Continuous Mandatory Ventilation)  RR (machine): 22  FiO2: 45                                        ABG - ( 06 Apr 2020 02:35 )  pH, Arterial: 7.39  pH, Blood: x     /  pCO2: 53    /  pO2: 79    / HCO3: 32    / Base Excess: 5.9   /  SaO2: 96                  MEDICATIONS  (STANDING):  cefepime   IVPB 1000 milliGRAM(s) IV Intermittent every 8 hours  chlorhexidine 0.12% Liquid 15 milliLiter(s) Oral Mucosa two times a day  chlorhexidine 4% Liquid 1 Application(s) Topical <User Schedule>  chlorhexidine 4% Liquid 1 Application(s) Topical two times a day  dexMEDEtomidine Infusion 0.2 MICROgram(s)/kG/Hr (4.89 mL/Hr) IV Continuous <Continuous>  heparin  Infusion 1400 Unit(s)/Hr (17 mL/Hr) IV Continuous <Continuous>  midazolam Infusion 0.02 mG/kG/Hr (1.96 mL/Hr) IV Continuous <Continuous>  morphine  Infusion. 1 mG/Hr (1 mL/Hr) IV Continuous <Continuous>  norepinephrine Infusion 0.05 MICROgram(s)/kG/Min (4.22 mL/Hr) IV Continuous <Continuous>  pantoprazole   Suspension 40 milliGRAM(s) Oral daily  vasopressin Infusion 0.01 Unit(s)/Min (0.6 mL/Hr) IV Continuous <Continuous>  vasopressin Infusion 0.05 Unit(s)/Min (3 mL/Hr) IV Continuous <Continuous>  zinc sulfate 220 milliGRAM(s) Oral daily    MEDICATIONS  (PRN):  acetaminophen    Suspension .. 650 milliGRAM(s) Oral every 6 hours PRN Temp greater or equal to 38C (100.4F)  acetaminophen   Tablet .. 650 milliGRAM(s) Oral every 6 hours PRN Temp greater or equal to 38.5C (101.3F)      Xrays:        CXR reviewed (My interpretation)- ET okay , OG okay , TLC okay . B/l lower lobe infiltrates . F/u official read                                                                               ECHO Not done

## 2020-04-06 NOTE — CHART NOTE - NSCHARTNOTEFT_GEN_A_CORE
Registered Dietitian Follow-Up     Patient Profile Reviewed                           Yes [x]   No []     Nutrition History Previously Obtained        Yes []  No [x]      Pertinent Subjective Info:  -Pt remains intubated/ sedated. transferred to [facility] on 4/3 d/t lack of beds. MAP 72. Per EMR only 1 BM since admit--?if constipation d/t medication vs acuity of illness vs TF as no other signs of intolerance at this time. Previous RD TF recommendations remain pending--updated recommendations at end of document d/w LIP.      Pertinent Medical Interventions:  1. Acute Hypoxic Respiratory Failure with ARDS and sepsis d/t COVID 19 PNA.  --remains febrile. s/p Plaquenil and tocilizumab. empirical Cefepime D4/7  2. Septic Shock      Diet order: Peptamen AF at 360 mL q6hrs (1728 kcal, 108 g protein, 1166 mL free H2O).      Anthropometrics:  - Ht. 157.5 cm.  - Wt. 98.7 kg (4/2) vs. previous wt 97.8 kg (3/28, dosing wt). will continue to use dosing wt for estimated needs   - BMI 39.8 using current wt 98.7 kg  - IBW 53.6 kg.     Pertinent Lab Data: (4/6/2020) RBC 4.21, H/H 12.2/39.6, BUN 21, glucose 133, corrected Ca 9.34     Pertinent Meds: heparin, cefepime, versed, morphine, zinc sulfate (since 3/30), levophed, protonix      Physical Findings:  - Appearance: intubated  - GI function: last BM 4/1--?constipation   - Tubes: OG tube  - Oral/Mouth cavity: NPO  - Skin: intact. 2+ edema perinem and 1+ edema b/l feet.      Nutrition Requirements  Weight Used: 97.8 kg ABW vs. 53.6 kg IBW; needs adjusted as pt remains intubated x1 week      Estimated Energy Needs    Adjust [x]  (obtained comparing 5772-1406 kcal (60-70% PSU 2003b (9070) Ve: 12.4 Tmax: 39.8) vs 1902-6903 kcal (11-14 kcal/kg CBW) vs 0251-1847 (22-25 kcal/kg IBW))  Adjusted energy needs = 7886-1503 kcal/day    Estimated Protein Needs    Continue [x]  g (1.5-2 g/kg IBW)  Estimated Fluid Needs        Continue [x] per LIP     Nutrient Intake: Current EN regimen at goal provides 115% kcal & 103% protein needs at goal.     [x] Previous Nutrition Diagnosis: Excessive energy intake            [x] Ongoing          [] Resolved     Nutrition Intervention: Enteral Nutrition, nutrition related medication mgmt   Recommend:  1. Continue Peptamen d/t desired omega-6:omega-3 ratio of 1.8:1 in pt with ARDS. Recommend adjust volume of TF to Peptamen AF @ 240mL q4H HOLDING 2AM FEED (total 5 feeds/day) + 1pack/day Prosource TF. TF at goal rate (including prosource) will provide 1480kcal, 101g protein, 972mL free H2O and 96% RDIs. Flushes per medical team. Maintain aspiration precautions.  2. Hold feed if cannot sustain MAP >65.  3. Consider bowel regimen if not contraindicated.  4. Recommend Zinc sulfate for total of 10-14 days.      Goal/Expected Outcome: Pt to meet >85% & <105% of estimated nutrient needs over next 3 days.     Indicator/Monitoring: Energy intake, glucose profile, renal profile, nutrition focused physical findings, body compositoin.

## 2020-04-06 NOTE — PROGRESS NOTE ADULT - ASSESSMENT
39y old  Male with PMHx of developmental delay (nonverbal at baseline), HLD who presents from [facility] for sob. Per chart review, pt was seen in the ED on 3/23 for fever and decreased po intake. Labs at that time were notable for WBC 2, AST/ALT 46/71, negative blood cx x2, cxr with bilateral opacities. Pt tolerated po and [facility]. Pt returned on 3/24 with fever, cough, and vomiting episodes after coughing. UA/urine cx negative, CT abd negative for acute abd pathology but showed RLL ground glass opacity. Pt was d[facility]ed back to jail and instructed to quarantine for possible COVID. Now returned to ER today for worsening sob over past several days. In the ER, He is febrile, tachycardic and tachypneic 30s-40s, satting 76% on RA. SpO2 improved to 91% on NRB but pt still tachypneic and not tolerating NRB well, so was intubated and started on precedex. Labs notable for WBC 2, AST 42, , trop <0.01. UA negative. Cxr with increased bilateral infiltrates.       1. Sepsis with SIRS due to COVID 19 Pneumonia  Procalcitonin 0.09 to 0.22  wbc dropping (leukopenic)  4/1 Cxray with Left base opacity partially obscuring the left hemidiaphragm, worse compared to prior examination. Right base opacity and other scattered opacities.  S/P hydroxychloroquine   S/P Tocilizumab     On cefepime   IVPB 1000 milliGRAM(s) IV Intermittent every 8 hours (Resumed 4/3)    - T(F): , Max: 104.1   - WBC Count: 7.61 K/uL     2. Acute Hypoxic Respiratory failure    PLAN  Continue cefepime until 4/10  Obtain sputum C&S and Gram stain

## 2020-04-06 NOTE — PROGRESS NOTE ADULT - SUBJECTIVE AND OBJECTIVE BOX
EBONY MARIE  39y, Male  Allergy: No Known Allergies      CHIEF COMPLAINT: Acute hypoxemic respiratory failure (06 Apr 2020 09:22)      INTERVAL EVENTS/HPI  - No acute events overnight  - T(F): , Max: 104.1 (04-05-20 @ 20:00)  - Tolerating medication  - WBC Count: 7.61 K/uL (04-06-20 @ 04:07)      ROS  General: Denies fevers, chills, nightsweats, weight loss  HEENT: Denies headache, rhinorrhea, sore throat, eye pain  CV: Denies CP, palpitations  PULM: Denies SOB, cough  GI: Denies abdominal pain, diarrhea  : Denies dysuria, hematuria  MSK: Denies arthralgias  SKIN: Denies rash   NEURO: Denies paresthesias, weakness  PSYCH: Denies depression    FH non-contributory   Social Hx non-contributory    VITALS:  T(F): 100.1, Max: 104.1 (04-05-20 @ 20:00)  HR: 121  BP: 98/54  RR: 20Vital Signs Last 24 Hrs  T(C): 37.8 (06 Apr 2020 16:15), Max: 40.1 (05 Apr 2020 20:00)  T(F): 100.1 (06 Apr 2020 16:15), Max: 104.1 (05 Apr 2020 20:00)  HR: 121 (06 Apr 2020 16:15) (86 - 127)  BP: 98/54 (06 Apr 2020 16:15) (91/53 - 108/58)  BP(mean): 78 (06 Apr 2020 15:08) (65 - 78)  RR: 20 (06 Apr 2020 16:15) (20 - 36)  SpO2: 94% (06 Apr 2020 16:15) (59% - 99%)    PHYSICAL EXAM:  see below       TESTS & MEASUREMENTS:                        12.2   7.61  )-----------( 261      ( 06 Apr 2020 04:07 )             39.6     04-06    143  |  104  |  21<H>  ----------------------------<  133<H>  4.9   |  30  |  0.9    Ca    8.3<L>      06 Apr 2020 04:07  Mg     2.2     04-06    TPro  5.5<L>  /  Alb  2.7<L>  /  TBili  0.8  /  DBili  x   /  AST  64<H>  /  ALT  68<H>  /  AlkPhos  192<H>  04-06    eGFR if Non : 107 mL/min/1.73M2 (04-06-20 @ 04:07)  eGFR if African American: 124 mL/min/1.73M2 (04-06-20 @ 04:07)    LIVER FUNCTIONS - ( 06 Apr 2020 04:07 )  Alb: 2.7 g/dL / Pro: 5.5 g/dL / ALK PHOS: 192 U/L / ALT: 68 U/L / AST: 64 U/L / GGT: x               Culture - Nose (collected 04-02-20 @ 18:43)  Source: .Nose Nose  Final Report (04-04-20 @ 21:40):    No MRSA isolated    No Staph Aureus (MSSA) isolated "This can represent normal nasal    carriage.  PCR is more sensitive for identifying MRSA/MSSA carriage"            INFECTIOUS DISEASES TESTING      RADIOLOGY & ADDITIONAL TESTS:  I have personally reviewed the last Chest xray  CXR  Xray Chest 1 View- PORTABLE-Urgent:   EXAM:  XR CHEST PORTABLE URGENT 1V            PROCEDURE DATE:  04/04/2020            INTERPRETATION:  Clinical History / Reason for exam: ET tube position changed    Comparison : Chest radiograph 4/3/2020.    Technique/Positioning: Single portable AP radiograph the chest.    Findings:    Support devices: Endotracheal tube tip approximately 1.5 cm above the marilin. Enteric tube courses below left hemidiaphragm. Right IJ catheter with tip in the SVC.    Cardiac/mediastinum/hilum: Unchanged    Lung parenchyma/Pleura: Stable bilateral opacities. No pneumothorax    Skeleton/soft tissues: Unchanged    Impression:      Stable bilateral opacities.                      BENI LEAL M.D., ATTENDING RADIOLOGIST  This document has been electronically signed. Apr 4 2020  8:03AM             (04-04-20 @ 02:52)  Xray Chest 1 View AP/PA:   EXAM:  XR CHEST FRONTAL 1V            PROCEDURE DATE:  04/04/2020            INTERPRETATION:  Clinical History / Reason for exam: Desaturation on vent.    Comparison : Chest radiograph 4/3/2020.    Technique/Positioning: Single portable AP radiograph the chest.    Findings:    Support devices: Endotracheal tube tip approximately 1.5 cm above marilin. Enteric tube tip just below left hemidiaphragm. Right IJ catheter with tip in the SVC    Cardiac/mediastinum/hilum: Unchanged    Lung parenchyma/Pleura: Bilateral opacities do not appear significantly changed, possibly slightly increased in the right periphery. No pneumothorax    Skeleton/soft tissues: Unchanged    Impression:      No significant change in bilateral opacities appreciated.                        BENI LEAL M.D., ATTENDING RADIOLOGIST  This document has been electronically signed. Apr 4 2020  7:18AM             (04-04-20 @ 01:47)      CT      CARDIOLOGY TESTING  12 Lead ECG:   Ventricular Rate 75 BPM    Atrial Rate 75 BPM    P-R Interval 150 ms    QRS Duration 96 ms    Q-T Interval 368 ms    QTC Calculation(Bezet) 410 ms    P Axis 72 degrees    R Axis 68 degrees    T Axis 42 degrees    Diagnosis Line Normal sinus rhythm  Incomplete right bundle branch block  Borderline ECG    Confirmed by BECKA MORRISON MD ([id number]) on 4/2/2020 1:01:30 PM (04-01-20 @ 12:22)      MEDICATIONS  cefepime   IVPB 1000  chlorhexidine 0.12% Liquid 15  chlorhexidine 4% Liquid 1  chlorhexidine 4% Liquid 1  dexMEDEtomidine Infusion 0.2  heparin  Injectable 5000  midazolam Infusion 0.02  morphine  Infusion. 1  norepinephrine Infusion 0.05  pantoprazole   Suspension 40  propofol Infusion 5  zinc sulfate 220      ANTIBIOTICS:  cefepime   IVPB 1000 milliGRAM(s) IV Intermittent every 8 hours      All available historical data has been reviewed

## 2020-04-06 NOTE — PROGRESS NOTE ADULT - ASSESSMENT
IMPRESSION:  Acute Hypoxemic Respiratory Failure s/p Intubation  COVID19 PNA   Possible superinfection   Sepsis  Septic shock  COVID PNA s/p plaquenil and Tociluzimab  HO Developmental delay from group home      PLAN:    CNS: SAT , if needed sedation with morphine and propofol    HEENT: Oral care; ET care    PULMONARY:  HOB @ 45 degrees. Aspiration Precautions . Vent changes : ARDS protocol .     CARDIOVASCULAR: Avoid volume overload . Keep I=O    GI: GI prophylaxis.  Feeding OG feeding    RENAL:  Follow up lytes.  Correct as needed    INFECTIOUS DISEASE: Follow up cultures. ABX complete duration of antibiotics. F/u ID     HEMATOLOGICAL:  DVT prophylaxis.    ENDOCRINE:  Follow up FS.  Insulin protocol if needed.    MUSCULOSKELETAL: Bedrest    Kuhn: Yes  Lines: TLC right   Code status: Full code  Poor prognosis  Disposition: MICU monitoring IMPRESSION:  Acute Hypoxemic Respiratory Failure s/p Intubation  COVID19 PNA   Possible superinfection   Sepsis  Septic shock  COVID PNA s/p plaquenil and Tociluzimab  HO Developmental delay from group home      PLAN:    CNS: SAT , if needed sedation with morphine and propofol    HEENT: Oral care; ET care    PULMONARY:  HOB @ 45 degrees. Aspiration Precautions . Vent changes : ARDS protocol . Dec IPAP 20 and IPAP 12    CARDIOVASCULAR: Avoid volume overload . Keep I=O    GI: GI prophylaxis.  Feeding OG feeding    RENAL:  Follow up lytes.  Correct as needed    INFECTIOUS DISEASE: Follow up cultures. ABX complete duration of antibiotics. F/u ID     HEMATOLOGICAL:  DVT prophylaxis.    ENDOCRINE:  Follow up FS.  Insulin protocol if needed.    MUSCULOSKELETAL: Bedrest    Kuhn: Yes  Lines: TLC right   Code status: Full code  Poor prognosis  Disposition: MICU monitoring

## 2020-04-06 NOTE — PROGRESS NOTE ADULT - ASSESSMENT
Patient is a 39 M w/ PMH of Development delay / Nonverbal / from a group home brought in on 3/23 w/ fever, developed acute RS failure and intubated due to COVID illness     # Acute Hypoxic Respiratory Failure with ARDS and sepsis due to COVID 19 Pneumonia  - Temp 101, Tachycardia, SBP High 90's  - on MV, settings PEEP 15, Fio2 45%, R22, changes: dec PEEP to 12, sedation vacation  - dc heparin Gtt, d-dimer improved   - empirical Cefepime D4/7  - s/p Plaquenil and tocilizumab      Diet- Tube Feeds  DVT ppx: heparin  GI ppx: protonix  Activity: bed rest  Dispo: Acute -   Spoke with Brother - Anesthesiologist - Lamberto Rowell [phone]  FULL CODE

## 2020-04-06 NOTE — PROGRESS NOTE ADULT - SUBJECTIVE AND OBJECTIVE BOX
Patient is a 39y old  Male who presents with a chief complaint of Acute hypoxemic respiratory failure (06 Apr 2020 07:29)      OVERNIGHT EVENTS: remained intubated on sedation and levophed     SUBJECTIVE / INTERVAL HPI: Patient seen and examined at bedside.     VITAL SIGNS:  Vital Signs Last 24 Hrs  T(C): 37.2 (06 Apr 2020 08:54), Max: 40.1 (05 Apr 2020 20:00)  T(F): 99 (06 Apr 2020 08:54), Max: 104.1 (05 Apr 2020 20:00)  HR: 100 (06 Apr 2020 08:54) (86 - 118)  BP: 100/56 (06 Apr 2020 08:54) (91/53 - 100/56)  BP(mean): 69 (06 Apr 2020 06:00) (65 - 73)  RR: 22 (06 Apr 2020 06:00) (22 - 22)  SpO2: 59% (06 Apr 2020 06:00) (59% - 98%)    PHYSICAL EXAM:      HEENT: NC/AT; PERRL, clear conjunctiva  Neck: supple  Cardiovascular: +S1/S2; RRR  Respiratory: CTA b/l; no W/R/R  Gastrointestinal: soft, NT/ND; +BSx4  Extremities: WWP; 2+ peripheral pulses; no edema   Neurological: sedated    MEDICATIONS:  MEDICATIONS  (STANDING):  cefepime   IVPB 1000 milliGRAM(s) IV Intermittent every 8 hours  chlorhexidine 0.12% Liquid 15 milliLiter(s) Oral Mucosa two times a day  chlorhexidine 4% Liquid 1 Application(s) Topical <User Schedule>  chlorhexidine 4% Liquid 1 Application(s) Topical two times a day  heparin  Injectable 5000 Unit(s) SubCutaneous every 8 hours  midazolam Infusion 0.02 mG/kG/Hr (1.96 mL/Hr) IV Continuous <Continuous>  morphine  Infusion. 1 mG/Hr (1 mL/Hr) IV Continuous <Continuous>  norepinephrine Infusion 0.05 MICROgram(s)/kG/Min (4.22 mL/Hr) IV Continuous <Continuous>  pantoprazole   Suspension 40 milliGRAM(s) Oral daily  zinc sulfate 220 milliGRAM(s) Oral daily    MEDICATIONS  (PRN):  acetaminophen    Suspension .. 650 milliGRAM(s) Oral every 6 hours PRN Temp greater or equal to 38C (100.4F)  acetaminophen   Tablet .. 650 milliGRAM(s) Oral every 6 hours PRN Temp greater or equal to 38.5C (101.3F)      ALLERGIES:  Allergies    No Known Allergies    Intolerances        LABS:                        12.2   7.61  )-----------( 261      ( 06 Apr 2020 04:07 )             39.6     04-06    143  |  104  |  21<H>  ----------------------------<  133<H>  4.9   |  30  |  0.9    Ca    8.3<L>      06 Apr 2020 04:07  Mg     2.2     04-06    TPro  5.5<L>  /  Alb  2.7<L>  /  TBili  0.8  /  DBili  x   /  AST  64<H>  /  ALT  68<H>  /  AlkPhos  192<H>  04-06    PTT - ( 06 Apr 2020 04:07 )  PTT:31.4 sec    D-Dimer Assay, Quantitative: 702 ng/mL DDU (04.05.20 @ 05:27)    Culture Results:   No MRSA isolated  No Staph Aureus (MSSA) isolated "This can represent normal nasal  carriage.  PCR is more sensitive for identifying MRSA/MSSA carriage" (04.02.20 @ 18:43)    Blood Gas Profile - Arterial (04.06.20 @ 02:35)    pH, Arterial: 7.39    pCO2, Arterial: 53 mmHg    pO2, Arterial: 79 mmHg    HCO3, Arterial: 32 mmoL/L    Base Excess, Arterial: 5.9 mmoL/L    Oxygen Saturation, Arterial: 96 %

## 2020-04-07 LAB
ALBUMIN SERPL ELPH-MCNC: 2.9 G/DL — LOW (ref 3.5–5.2)
ALBUMIN SERPL ELPH-MCNC: 3 G/DL — LOW (ref 3.5–5.2)
ALBUMIN SERPL ELPH-MCNC: 3 G/DL — LOW (ref 3.5–5.2)
ALP SERPL-CCNC: 179 U/L — HIGH (ref 30–115)
ALP SERPL-CCNC: 200 U/L — HIGH (ref 30–115)
ALP SERPL-CCNC: 203 U/L — HIGH (ref 30–115)
ALT FLD-CCNC: 51 U/L — HIGH (ref 0–41)
ALT FLD-CCNC: 67 U/L — HIGH (ref 0–41)
ALT FLD-CCNC: 73 U/L — HIGH (ref 0–41)
ANION GAP SERPL CALC-SCNC: 10 MMOL/L — SIGNIFICANT CHANGE UP (ref 7–14)
ANION GAP SERPL CALC-SCNC: 12 MMOL/L — SIGNIFICANT CHANGE UP (ref 7–14)
ANION GAP SERPL CALC-SCNC: 13 MMOL/L — SIGNIFICANT CHANGE UP (ref 7–14)
AST SERPL-CCNC: 38 U/L — SIGNIFICANT CHANGE UP (ref 0–41)
AST SERPL-CCNC: 73 U/L — HIGH (ref 0–41)
AST SERPL-CCNC: 77 U/L — HIGH (ref 0–41)
BASE EXCESS BLDA CALC-SCNC: 5 MMOL/L — HIGH (ref -2–2)
BASOPHILS # BLD AUTO: 0.08 K/UL — SIGNIFICANT CHANGE UP (ref 0–0.2)
BASOPHILS NFR BLD AUTO: 0.7 % — SIGNIFICANT CHANGE UP (ref 0–1)
BILIRUB SERPL-MCNC: 0.7 MG/DL — SIGNIFICANT CHANGE UP (ref 0.2–1.2)
BILIRUB SERPL-MCNC: 0.8 MG/DL — SIGNIFICANT CHANGE UP (ref 0.2–1.2)
BILIRUB SERPL-MCNC: 0.8 MG/DL — SIGNIFICANT CHANGE UP (ref 0.2–1.2)
BUN SERPL-MCNC: 18 MG/DL — SIGNIFICANT CHANGE UP (ref 10–20)
BUN SERPL-MCNC: 23 MG/DL — HIGH (ref 10–20)
BUN SERPL-MCNC: 24 MG/DL — HIGH (ref 10–20)
CALCIUM SERPL-MCNC: 8.3 MG/DL — LOW (ref 8.5–10.1)
CALCIUM SERPL-MCNC: 8.4 MG/DL — LOW (ref 8.5–10.1)
CALCIUM SERPL-MCNC: 8.5 MG/DL — SIGNIFICANT CHANGE UP (ref 8.5–10.1)
CHLORIDE SERPL-SCNC: 101 MMOL/L — SIGNIFICANT CHANGE UP (ref 98–110)
CHLORIDE SERPL-SCNC: 103 MMOL/L — SIGNIFICANT CHANGE UP (ref 98–110)
CHLORIDE SERPL-SCNC: 99 MMOL/L — SIGNIFICANT CHANGE UP (ref 98–110)
CO2 SERPL-SCNC: 29 MMOL/L — SIGNIFICANT CHANGE UP (ref 17–32)
CO2 SERPL-SCNC: 31 MMOL/L — SIGNIFICANT CHANGE UP (ref 17–32)
CO2 SERPL-SCNC: 32 MMOL/L — SIGNIFICANT CHANGE UP (ref 17–32)
CREAT SERPL-MCNC: 0.8 MG/DL — SIGNIFICANT CHANGE UP (ref 0.7–1.5)
CREAT SERPL-MCNC: 0.9 MG/DL — SIGNIFICANT CHANGE UP (ref 0.7–1.5)
CREAT SERPL-MCNC: 1 MG/DL — SIGNIFICANT CHANGE UP (ref 0.7–1.5)
EOSINOPHIL # BLD AUTO: 0.08 K/UL — SIGNIFICANT CHANGE UP (ref 0–0.7)
EOSINOPHIL NFR BLD AUTO: 0.7 % — SIGNIFICANT CHANGE UP (ref 0–8)
GAS PNL BLDA: SIGNIFICANT CHANGE UP
GAS PNL BLDA: SIGNIFICANT CHANGE UP
GLUCOSE BLDC GLUCOMTR-MCNC: 133 MG/DL — HIGH (ref 70–99)
GLUCOSE BLDC GLUCOMTR-MCNC: 136 MG/DL — HIGH (ref 70–99)
GLUCOSE BLDC GLUCOMTR-MCNC: 151 MG/DL — HIGH (ref 70–99)
GLUCOSE BLDC GLUCOMTR-MCNC: 189 MG/DL — HIGH (ref 70–99)
GLUCOSE SERPL-MCNC: 139 MG/DL — HIGH (ref 70–99)
GLUCOSE SERPL-MCNC: 146 MG/DL — HIGH (ref 70–99)
GLUCOSE SERPL-MCNC: 171 MG/DL — HIGH (ref 70–99)
GRAM STN FLD: SIGNIFICANT CHANGE UP
HCO3 BLDA-SCNC: 33 MMOL/L — HIGH (ref 23–27)
HCT VFR BLD CALC: 41.6 % — LOW (ref 42–52)
HGB BLD-MCNC: 12.6 G/DL — LOW (ref 14–18)
IMM GRANULOCYTES NFR BLD AUTO: 2.8 % — HIGH (ref 0.1–0.3)
LYMPHOCYTES # BLD AUTO: 0.67 K/UL — LOW (ref 1.2–3.4)
LYMPHOCYTES # BLD AUTO: 5.6 % — LOW (ref 20.5–51.1)
MAGNESIUM SERPL-MCNC: 2.2 MG/DL — SIGNIFICANT CHANGE UP (ref 1.8–2.4)
MCHC RBC-ENTMCNC: 30.1 PG — SIGNIFICANT CHANGE UP (ref 27–31)
MCHC RBC-ENTMCNC: 30.3 G/DL — LOW (ref 32–37)
MCV RBC AUTO: 99.3 FL — HIGH (ref 80–94)
MONOCYTES # BLD AUTO: 0.48 K/UL — SIGNIFICANT CHANGE UP (ref 0.1–0.6)
MONOCYTES NFR BLD AUTO: 4 % — SIGNIFICANT CHANGE UP (ref 1.7–9.3)
MRSA PCR RESULT.: NEGATIVE — SIGNIFICANT CHANGE UP
NEUTROPHILS # BLD AUTO: 10.34 K/UL — HIGH (ref 1.4–6.5)
NEUTROPHILS NFR BLD AUTO: 86.2 % — HIGH (ref 42.2–75.2)
NRBC # BLD: 0 /100 WBCS — SIGNIFICANT CHANGE UP (ref 0–0)
PCO2 BLDA: 56 MMHG — HIGH (ref 38–42)
PH BLDA: 7.38 — SIGNIFICANT CHANGE UP (ref 7.38–7.42)
PLATELET # BLD AUTO: 250 K/UL — SIGNIFICANT CHANGE UP (ref 130–400)
PO2 BLDA: 91 MMHG — SIGNIFICANT CHANGE UP (ref 78–95)
POTASSIUM SERPL-MCNC: 4.7 MMOL/L — SIGNIFICANT CHANGE UP (ref 3.5–5)
POTASSIUM SERPL-MCNC: 5.2 MMOL/L — HIGH (ref 3.5–5)
POTASSIUM SERPL-MCNC: 5.6 MMOL/L — HIGH (ref 3.5–5)
POTASSIUM SERPL-SCNC: 4.7 MMOL/L — SIGNIFICANT CHANGE UP (ref 3.5–5)
POTASSIUM SERPL-SCNC: 5.2 MMOL/L — HIGH (ref 3.5–5)
POTASSIUM SERPL-SCNC: 5.6 MMOL/L — HIGH (ref 3.5–5)
PROT SERPL-MCNC: 5.8 G/DL — LOW (ref 6–8)
PROT SERPL-MCNC: 6 G/DL — SIGNIFICANT CHANGE UP (ref 6–8)
PROT SERPL-MCNC: 6.2 G/DL — SIGNIFICANT CHANGE UP (ref 6–8)
RBC # BLD: 4.19 M/UL — LOW (ref 4.7–6.1)
RBC # FLD: 17.5 % — HIGH (ref 11.5–14.5)
SAO2 % BLDA: 98 % — SIGNIFICANT CHANGE UP (ref 94–98)
SODIUM SERPL-SCNC: 143 MMOL/L — SIGNIFICANT CHANGE UP (ref 135–146)
SODIUM SERPL-SCNC: 143 MMOL/L — SIGNIFICANT CHANGE UP (ref 135–146)
SODIUM SERPL-SCNC: 144 MMOL/L — SIGNIFICANT CHANGE UP (ref 135–146)
SPECIMEN SOURCE: SIGNIFICANT CHANGE UP
WBC # BLD: 11.98 K/UL — HIGH (ref 4.8–10.8)
WBC # FLD AUTO: 11.98 K/UL — HIGH (ref 4.8–10.8)

## 2020-04-07 PROCEDURE — 71045 X-RAY EXAM CHEST 1 VIEW: CPT | Mod: 26

## 2020-04-07 PROCEDURE — 99291 CRITICAL CARE FIRST HOUR: CPT

## 2020-04-07 RX ORDER — INSULIN HUMAN 100 [IU]/ML
10 INJECTION, SOLUTION SUBCUTANEOUS ONCE
Refills: 0 | Status: COMPLETED | OUTPATIENT
Start: 2020-04-07 | End: 2020-04-07

## 2020-04-07 RX ORDER — ACETAMINOPHEN 500 MG
650 TABLET ORAL ONCE
Refills: 0 | Status: COMPLETED | OUTPATIENT
Start: 2020-04-07 | End: 2020-04-07

## 2020-04-07 RX ORDER — CISATRACURIUM BESYLATE 2 MG/ML
10 INJECTION INTRAVENOUS ONCE
Refills: 0 | Status: DISCONTINUED | OUTPATIENT
Start: 2020-04-07 | End: 2020-04-07

## 2020-04-07 RX ORDER — SODIUM CHLORIDE 9 MG/ML
500 INJECTION, SOLUTION INTRAVENOUS
Refills: 0 | Status: DISCONTINUED | OUTPATIENT
Start: 2020-04-07 | End: 2020-04-08

## 2020-04-07 RX ORDER — MIDAZOLAM HYDROCHLORIDE 1 MG/ML
0.02 INJECTION, SOLUTION INTRAMUSCULAR; INTRAVENOUS
Qty: 100 | Refills: 0 | Status: DISCONTINUED | OUTPATIENT
Start: 2020-04-07 | End: 2020-04-08

## 2020-04-07 RX ORDER — ACETAMINOPHEN 500 MG
325 TABLET ORAL ONCE
Refills: 0 | Status: COMPLETED | OUTPATIENT
Start: 2020-04-07 | End: 2020-04-07

## 2020-04-07 RX ORDER — ACETAMINOPHEN 500 MG
1000 TABLET ORAL ONCE
Refills: 0 | Status: COMPLETED | OUTPATIENT
Start: 2020-04-07 | End: 2020-04-07

## 2020-04-07 RX ORDER — DEXTROSE 50 % IN WATER 50 %
50 SYRINGE (ML) INTRAVENOUS ONCE
Refills: 0 | Status: DISCONTINUED | OUTPATIENT
Start: 2020-04-07 | End: 2020-04-07

## 2020-04-07 RX ORDER — VANCOMYCIN HCL 1 G
1000 VIAL (EA) INTRAVENOUS ONCE
Refills: 0 | Status: COMPLETED | OUTPATIENT
Start: 2020-04-07 | End: 2020-04-07

## 2020-04-07 RX ORDER — DEXTROSE 10 % IN WATER 10 %
250 INTRAVENOUS SOLUTION INTRAVENOUS
Refills: 0 | Status: DISCONTINUED | OUTPATIENT
Start: 2020-04-07 | End: 2020-04-08

## 2020-04-07 RX ORDER — SODIUM POLYSTYRENE SULFONATE 4.1 MEQ/G
30 POWDER, FOR SUSPENSION ORAL ONCE
Refills: 0 | Status: COMPLETED | OUTPATIENT
Start: 2020-04-07 | End: 2020-04-07

## 2020-04-07 RX ORDER — INSULIN HUMAN 100 [IU]/ML
10 INJECTION, SOLUTION SUBCUTANEOUS ONCE
Refills: 0 | Status: DISCONTINUED | OUTPATIENT
Start: 2020-04-07 | End: 2020-04-07

## 2020-04-07 RX ADMIN — CHLORHEXIDINE GLUCONATE 1 APPLICATION(S): 213 SOLUTION TOPICAL at 05:22

## 2020-04-07 RX ADMIN — CHLORHEXIDINE GLUCONATE 15 MILLILITER(S): 213 SOLUTION TOPICAL at 05:23

## 2020-04-07 RX ADMIN — HEPARIN SODIUM 5000 UNIT(S): 5000 INJECTION INTRAVENOUS; SUBCUTANEOUS at 05:22

## 2020-04-07 RX ADMIN — CHLORHEXIDINE GLUCONATE 1 APPLICATION(S): 213 SOLUTION TOPICAL at 18:26

## 2020-04-07 RX ADMIN — HEPARIN SODIUM 5000 UNIT(S): 5000 INJECTION INTRAVENOUS; SUBCUTANEOUS at 21:29

## 2020-04-07 RX ADMIN — PANTOPRAZOLE SODIUM 40 MILLIGRAM(S): 20 TABLET, DELAYED RELEASE ORAL at 11:54

## 2020-04-07 RX ADMIN — Medication 650 MILLIGRAM(S): at 17:36

## 2020-04-07 RX ADMIN — CEFEPIME 100 MILLIGRAM(S): 1 INJECTION, POWDER, FOR SOLUTION INTRAMUSCULAR; INTRAVENOUS at 21:24

## 2020-04-07 RX ADMIN — CEFEPIME 100 MILLIGRAM(S): 1 INJECTION, POWDER, FOR SOLUTION INTRAMUSCULAR; INTRAVENOUS at 13:30

## 2020-04-07 RX ADMIN — SODIUM CHLORIDE 1000 MILLILITER(S): 9 INJECTION, SOLUTION INTRAVENOUS at 08:55

## 2020-04-07 RX ADMIN — ZINC SULFATE TAB 220 MG (50 MG ZINC EQUIVALENT) 220 MILLIGRAM(S): 220 (50 ZN) TAB at 13:32

## 2020-04-07 RX ADMIN — CHLORHEXIDINE GLUCONATE 1 APPLICATION(S): 213 SOLUTION TOPICAL at 05:23

## 2020-04-07 RX ADMIN — HEPARIN SODIUM 5000 UNIT(S): 5000 INJECTION INTRAVENOUS; SUBCUTANEOUS at 13:32

## 2020-04-07 RX ADMIN — CEFEPIME 100 MILLIGRAM(S): 1 INJECTION, POWDER, FOR SOLUTION INTRAMUSCULAR; INTRAVENOUS at 05:23

## 2020-04-07 RX ADMIN — Medication 325 MILLIGRAM(S): at 12:54

## 2020-04-07 RX ADMIN — Medication 650 MILLIGRAM(S): at 05:24

## 2020-04-07 RX ADMIN — Medication 250 MILLILITER(S): at 12:56

## 2020-04-07 RX ADMIN — Medication 400 MILLIGRAM(S): at 21:55

## 2020-04-07 RX ADMIN — CHLORHEXIDINE GLUCONATE 15 MILLILITER(S): 213 SOLUTION TOPICAL at 18:26

## 2020-04-07 RX ADMIN — MIDAZOLAM HYDROCHLORIDE 1.96 MG/KG/HR: 1 INJECTION, SOLUTION INTRAMUSCULAR; INTRAVENOUS at 10:38

## 2020-04-07 RX ADMIN — Medication 650 MILLIGRAM(S): at 21:17

## 2020-04-07 RX ADMIN — INSULIN HUMAN 10 UNIT(S): 100 INJECTION, SOLUTION SUBCUTANEOUS at 12:55

## 2020-04-07 RX ADMIN — Medication 250 MILLIGRAM(S): at 11:28

## 2020-04-07 RX ADMIN — SODIUM POLYSTYRENE SULFONATE 30 GRAM(S): 4.1 POWDER, FOR SUSPENSION ORAL at 13:30

## 2020-04-07 RX ADMIN — MORPHINE SULFATE 1 MG/HR: 50 CAPSULE, EXTENDED RELEASE ORAL at 10:39

## 2020-04-07 RX ADMIN — Medication 650 MILLIGRAM(S): at 11:43

## 2020-04-07 NOTE — PROGRESS NOTE ADULT - ASSESSMENT
IMPRESSION:    Acute Hypoxemic Respiratory Failure with Hypercapnia  s/p Intubation  COVID19 PNA s/p plaquenil and Tociluzimab  Possible superinfection   Sepsis  Septic shock  HO Developmental delay from group home  Hyperkalemia      PLAN:    CNS: sedation with morphine and precedex    HEENT: Oral care; ET care    PULMONARY:  HOB @ 45 degrees. Aspiration Precautions . Push ETT 1 cm Vent changes : ARDS protocol .IPAP 25 and EPAP 10 , RR 26    CARDIOVASCULAR: Avoid volume overload . Keep I=O,  bolus . wean levophed    GI: GI prophylaxis.  Feeding OG feeding    RENAL:  Follow up lytes.  Correct as needed. Low Potassium diet    INFECTIOUS DISEASE: Follow up cultures. Deep ETT culture.  ABX complete duration of antibiotics. F/u ID     HEMATOLOGICAL:  DVT prophylaxis.    ENDOCRINE:  Follow up FS.  Insulin protocol if needed.    MUSCULOSKELETAL: Bedrest    Kuhn: Yes  Lines: TLC right   Code status: Full code  Poor prognosis  Disposition: MICU monitoring IMPRESSION:    Acute Hypoxemic Respiratory Failure with Hypercapnia  s/p Intubation  COVID19 PNA s/p plaquenil and Tociluzimab  Possible superinfection   Sepsis  Septic shock  HO Developmental delay from group home  Hyperkalemia      PLAN:    CNS: sedation with morphine and versed    HEENT: Oral care; ET care    PULMONARY:  HOB @ 45 degrees. Aspiration Precautions . Push ETT 1 cm Vent changes : ARDS protocol .IPAP 40 and EPAP 10 , RR 30. Will change ventilator to try LTV.  Repeat ABG     CARDIOVASCULAR: Avoid volume overload . Keep I=O,  bolus . wean levophed    GI: GI prophylaxis.  Feeding OG feeding    RENAL:  Follow up lytes.  Correct as needed. Low Potassium diet     INFECTIOUS DISEASE: Repeat cultures. Deep ETT culture.  cefepime . One dose of vancomycin IV x 1  . Nasal MRSA . F/u ID     HEMATOLOGICAL:  DVT prophylaxis    ENDOCRINE:  Follow up FS.  Insulin protocol if needed    MUSCULOSKELETAL: Bedrest    Kuhn: Yes  Lines: TLC right   Code status: Full code  Poor prognosis  Disposition: MICU monitoring IMPRESSION:    Acute Hypoxemic Respiratory Failure with Hypercapnia  s/p Intubation  COVID19 PNA s/p plaquenil and Tociluzimab  Possible superinfection   Sepsis  Septic shock  HO Developmental delay from group home  Hyperkalemia      PLAN:    CNS: sedation with morphine and versed    HEENT: Oral care; ET care    PULMONARY:  HOB @ 45 degrees. Aspiration Precautions . Push ETT 1 cm Vent changes : ARDS protocol .IPAP 40 and EPAP 10 , RR 30. Will change ventilator to try LTV.  Repeat ABG     CARDIOVASCULAR: Avoid volume overload . Keep I=O,  bolus . wean levophed    GI: GI prophylaxis.  Feeding OG feeding    RENAL:  Follow up lytes.  Correct as needed. Low Potassium diet     INFECTIOUS DISEASE: Repeat cultures. Deep ETT culture.  cefepime . One dose of vancomycin IV x 1  . Nasal MRSA . F/u ID     HEMATOLOGICAL:  DVT prophylaxis    ENDOCRINE:  Follow up FS.  Insulin protocol if needed    MUSCULOSKELETAL: Bedrest    Kuhn: Yes  Lines: TLC right   Code status: Full code  Poor prognosis  Disposition: MICU monitoring   please see attestation note

## 2020-04-07 NOTE — PROGRESS NOTE ADULT - SUBJECTIVE AND OBJECTIVE BOX
Patient is a 39y old  Male who presents with a chief complaint of Acute hypoxemic respiratory failure (07 Apr 2020 07:26)      OVERNIGHT EVENTS: febrile overnight, remaiend sedated and intubated, breathing out of vent with hypercapnia in am ABG    SUBJECTIVE / INTERVAL HPI: Patient seen and examined at bedside.     VITAL SIGNS:  Vital Signs Last 24 Hrs  T(C): 37.6 (07 Apr 2020 08:00), Max: 38.7 (06 Apr 2020 14:29)  T(F): 99.7 (07 Apr 2020 08:00), Max: 101.6 (06 Apr 2020 14:29)  HR: 129 (07 Apr 2020 08:57) (66 - 138)  BP: 81/49 (07 Apr 2020 08:57) (81/49 - 133/60)  BP(mean): 67 (07 Apr 2020 04:00) (67 - 80)  RR: 30 (07 Apr 2020 08:57) (20 - 38)  SpO2: 96% (07 Apr 2020 08:57) (88% - 97%)    PHYSICAL EXAM:      HEENT: NC/AT; PERRL, clear conjunctiva  Neck: supple  Cardiovascular: +S1/S2; RRR  Respiratory: CTA b/l; no W/R/R  Gastrointestinal: soft, NT/ND; +BSx4  Extremities: WWP; 2+ peripheral pulses; no edema   Neurological: sedated     MEDICATIONS:  MEDICATIONS  (STANDING):  cefepime   IVPB 1000 milliGRAM(s) IV Intermittent every 8 hours  chlorhexidine 0.12% Liquid 15 milliLiter(s) Oral Mucosa two times a day  chlorhexidine 4% Liquid 1 Application(s) Topical two times a day  chlorhexidine 4% Liquid 1 Application(s) Topical <User Schedule>  dexMEDEtomidine Infusion 0.2 MICROgram(s)/kG/Hr (4.89 mL/Hr) IV Continuous <Continuous>  dextrose 10%. 250 milliLiter(s) (250 mL/Hr) IV Continuous <Continuous>  heparin  Injectable 5000 Unit(s) SubCutaneous every 8 hours  insulin regular  human recombinant. 10 Unit(s) SubCutaneous once  lactated ringers. 500 milliLiter(s) (1000 mL/Hr) IV Continuous <Continuous>  midazolam Infusion 0.02 mG/kG/Hr (1.96 mL/Hr) IV Continuous <Continuous>  morphine  Infusion. 1 mG/Hr (1 mL/Hr) IV Continuous <Continuous>  norepinephrine Infusion 0.05 MICROgram(s)/kG/Min (4.22 mL/Hr) IV Continuous <Continuous>  pantoprazole   Suspension 40 milliGRAM(s) Oral daily  sodium polystyrene sulfonate Suspension 30 Gram(s) Oral once  vancomycin  IVPB 1000 milliGRAM(s) IV Intermittent once  zinc sulfate 220 milliGRAM(s) Oral daily    MEDICATIONS  (PRN):  acetaminophen    Suspension .. 650 milliGRAM(s) Oral every 6 hours PRN Temp greater or equal to 38C (100.4F)  acetaminophen   Tablet .. 650 milliGRAM(s) Oral every 6 hours PRN Temp greater or equal to 38.5C (101.3F)      ALLERGIES:  Allergies    No Known Allergies    Intolerances        LABS:                        12.6   11.98 )-----------( 250      ( 07 Apr 2020 06:12 )             41.6     04-07    143  |  101  |  18  ----------------------------<  146<H>  5.6<H>   |  32  |  0.8    Ca    8.5      07 Apr 2020 06:12  Mg     2.2     04-07    TPro  6.2  /  Alb  2.9<L>  /  TBili  0.7  /  DBili  x   /  AST  38  /  ALT  51<H>  /  AlkPhos  179<H>  04-07    PTT - ( 06 Apr 2020 04:07 )  PTT:31.4 sec    CAPILLARY BLOOD GLUCOSE      POCT Blood Glucose.: 151 mg/dL (07 Apr 2020 07:22)    Culture - Blood in AM (04.05.20 @ 21:40)    Specimen Source: .Blood Blood    Culture Results:   No growth to date. Patient is a 39y old  Male with Down syndrome who presents with a chief complaint of Acute hypoxemic respiratory failure (07 Apr 2020 07:26)      OVERNIGHT EVENTS: febrile overnight, remaiend sedated and intubated, breathing out of vent with hypercapnia in am ABG    SUBJECTIVE / INTERVAL HPI: Patient seen and examined at bedside.     VITAL SIGNS:  Vital Signs Last 24 Hrs  T(C): 37.6 (07 Apr 2020 08:00), Max: 38.7 (06 Apr 2020 14:29)  T(F): 99.7 (07 Apr 2020 08:00), Max: 101.6 (06 Apr 2020 14:29)  HR: 129 (07 Apr 2020 08:57) (66 - 138)  BP: 81/49 (07 Apr 2020 08:57) (81/49 - 133/60)  BP(mean): 67 (07 Apr 2020 04:00) (67 - 80)  RR: 30 (07 Apr 2020 08:57) (20 - 38)  SpO2: 96% (07 Apr 2020 08:57) (88% - 97%)    PHYSICAL EXAM:      HEENT: NC/AT; PERRL, clear conjunctiva  Neck: supple  Cardiovascular: +S1/S2; RRR  Respiratory: CTA b/l; no W/R/R  Gastrointestinal: soft, NT/ND; +BSx4  Extremities: WWP; 2+ peripheral pulses; no edema   Neurological: sedated     MEDICATIONS:  MEDICATIONS  (STANDING):  cefepime   IVPB 1000 milliGRAM(s) IV Intermittent every 8 hours  chlorhexidine 0.12% Liquid 15 milliLiter(s) Oral Mucosa two times a day  chlorhexidine 4% Liquid 1 Application(s) Topical two times a day  chlorhexidine 4% Liquid 1 Application(s) Topical <User Schedule>  dexMEDEtomidine Infusion 0.2 MICROgram(s)/kG/Hr (4.89 mL/Hr) IV Continuous <Continuous>  dextrose 10%. 250 milliLiter(s) (250 mL/Hr) IV Continuous <Continuous>  heparin  Injectable 5000 Unit(s) SubCutaneous every 8 hours  insulin regular  human recombinant. 10 Unit(s) SubCutaneous once  lactated ringers. 500 milliLiter(s) (1000 mL/Hr) IV Continuous <Continuous>  midazolam Infusion 0.02 mG/kG/Hr (1.96 mL/Hr) IV Continuous <Continuous>  morphine  Infusion. 1 mG/Hr (1 mL/Hr) IV Continuous <Continuous>  norepinephrine Infusion 0.05 MICROgram(s)/kG/Min (4.22 mL/Hr) IV Continuous <Continuous>  pantoprazole   Suspension 40 milliGRAM(s) Oral daily  sodium polystyrene sulfonate Suspension 30 Gram(s) Oral once  vancomycin  IVPB 1000 milliGRAM(s) IV Intermittent once  zinc sulfate 220 milliGRAM(s) Oral daily    MEDICATIONS  (PRN):  acetaminophen    Suspension .. 650 milliGRAM(s) Oral every 6 hours PRN Temp greater or equal to 38C (100.4F)  acetaminophen   Tablet .. 650 milliGRAM(s) Oral every 6 hours PRN Temp greater or equal to 38.5C (101.3F)      ALLERGIES:  Allergies    No Known Allergies    Intolerances        LABS:                        12.6   11.98 )-----------( 250      ( 07 Apr 2020 06:12 )             41.6     04-07    143  |  101  |  18  ----------------------------<  146<H>  5.6<H>   |  32  |  0.8    Ca    8.5      07 Apr 2020 06:12  Mg     2.2     04-07    TPro  6.2  /  Alb  2.9<L>  /  TBili  0.7  /  DBili  x   /  AST  38  /  ALT  51<H>  /  AlkPhos  179<H>  04-07    PTT - ( 06 Apr 2020 04:07 )  PTT:31.4 sec    CAPILLARY BLOOD GLUCOSE      POCT Blood Glucose.: 151 mg/dL (07 Apr 2020 07:22)    Culture - Blood in AM (04.05.20 @ 21:40)    Specimen Source: .Blood Blood    Culture Results:   No growth to date.

## 2020-04-07 NOTE — PROGRESS NOTE ADULT - SUBJECTIVE AND OBJECTIVE BOX
Patient is a 39y old  Male who presents with a chief complaint of Acute hypoxemic respiratory failure (06 Apr 2020 19:21)        Over Night Events: Remains Critically ill and on MV , Febrile overnight . On levophed and Sedation     ROS:  See HPI    PHYSICAL EXAM    ICU Vital Signs Last 24 Hrs  T(C): 38.1 (07 Apr 2020 06:00), Max: 38.7 (06 Apr 2020 14:29)  T(F): 100.6 (07 Apr 2020 06:00), Max: 101.6 (06 Apr 2020 14:29)  HR: 66 (07 Apr 2020 06:00) (66 - 127)  BP: 95/51 (07 Apr 2020 06:00) (91/55 - 111/56)  BP(mean): 67 (07 Apr 2020 04:00) (67 - 80)  ABP: --  ABP(mean): --  RR: 34 (07 Apr 2020 06:00) (20 - 38)  SpO2: 97% (07 Apr 2020 06:00) (88% - 99%)      General: Intubated and sedated  HEENT: TIFFANIE  , ET+           Lungs: Bilateral crackles+  Cardiovascular: Regular   Gastrointestinal: Soft, Positive BS  Extremities: No clubbing.  Moves extremities.  Full Range of motion   Skin: Warm, intact  Neurological: sedated, moves all extremities       04-06-20 @ 07:01  -  04-07-20 @ 07:00  --------------------------------------------------------  IN:    dexmedetomidine Infusion: 177.1 mL    Enteral Tube Flush: 220 mL    heparin Infusion: 17 mL    IV PiggyBack: 100 mL    midazolam Infusion: 63.6 mL    morphine  Infusion.: 23 mL    norepinephrine Infusion: 348.9 mL    Peptamen A.F.: 1320 mL    propofol Infusion: 37.8 mL  Total IN: 2307.4 mL    OUT:    Indwelling Catheter - Urethral: 1655 mL  Total OUT: 1655 mL    Total NET: 652.4 mL          LABS:                            12.6   11.98 )-----------( 250      ( 07 Apr 2020 06:12 )             41.6                                               04-07               12.6   11.98 )-----------( 250      ( 04-07 @ 06:12 )             41.6                12.2   7.61  )-----------( 261      ( 04-06 @ 04:07 )             39.6                12.8   5.71  )-----------( 339      ( 04-05 @ 05:27 )             41.8       143  |  101  |  18  ----------------------------<  146<H>  5.6<H>   |  32  |  0.8    07 Apr 2020 06:12    143    |  101    |  18     ----------------------------<  146<H>  5.6<H>   |  32     |  0.8    06 Apr 2020 04:07    143    |  104    |  21<H>  ----------------------------<  133<H>  4.9     |  30     |  0.9      Ca    8.5        07 Apr 2020 06:12  Ca    8.3<L>      06 Apr 2020 04:07  Mg     2.2       07 Apr 2020 06:12  Mg     2.2       06 Apr 2020 04:07    TPro  6.2    /  Alb  2.9<L>  /  TBili  0.7    /  DBili  x      /  AST  38     /  ALT  51<H>  /  AlkPhos  179<H>  07 Apr 2020 06:12  TPro  5.5<L>  /  Alb  2.7<L>  /  TBili  0.8    /  DBili  x      /  AST  64<H>  /  ALT  68<H>  /  AlkPhos  192<H>  06 Apr 2020 04:07    Ca    8.5      07 Apr 2020 06:12  Mg     2.2     04-07    TPro  6.2  /  Alb  2.9<L>  /  TBili  0.7  /  DBili  x   /  AST  38  /  ALT  51<H>  /  AlkPhos  179<H>  04-07      PTT - ( 06 Apr 2020 04:07 )  PTT:31.4 sec                                                                                     LIVER FUNCTIONS - ( 07 Apr 2020 06:12 )  Alb: 2.9 g/dL / Pro: 6.2 g/dL / ALK PHOS: 179 U/L / ALT: 51 U/L / AST: 38 U/L / GGT: x                                                  Culture - Blood (collected 05 Apr 2020 21:40)  Source: .Blood Blood  Preliminary Report (07 Apr 2020 07:01):    No growth to date.                                                   Mode: AC/ CMV (Assist Control/ Continuous Mandatory Ventilation)  RR (machine): 22  FiO2: 100  PEEP: 12  PC: 25  PIP: 25                                      ABG - ( 07 Apr 2020 02:58 )  pH, Arterial: 7.12  pH, Blood: x     /  pCO2: 111   /  pO2: 101   / HCO3: 36    / Base Excess: 2.9   /  SaO2: 96          MEDICATIONS  (STANDING):  cefepime   IVPB 1000 milliGRAM(s) IV Intermittent every 8 hours  chlorhexidine 0.12% Liquid 15 milliLiter(s) Oral Mucosa two times a day  chlorhexidine 4% Liquid 1 Application(s) Topical two times a day  chlorhexidine 4% Liquid 1 Application(s) Topical <User Schedule>  dexMEDEtomidine Infusion 0.2 MICROgram(s)/kG/Hr (4.89 mL/Hr) IV Continuous <Continuous>  heparin  Injectable 5000 Unit(s) SubCutaneous every 8 hours  midazolam Infusion 0.02 mG/kG/Hr (1.96 mL/Hr) IV Continuous <Continuous>  morphine  Infusion. 1 mG/Hr (1 mL/Hr) IV Continuous <Continuous>  norepinephrine Infusion 0.05 MICROgram(s)/kG/Min (4.22 mL/Hr) IV Continuous <Continuous>  pantoprazole   Suspension 40 milliGRAM(s) Oral daily  propofol Infusion 5 MICROgram(s)/kG/Min (2.93 mL/Hr) IV Continuous <Continuous>  zinc sulfate 220 milliGRAM(s) Oral daily    MEDICATIONS  (PRN):  acetaminophen    Suspension .. 650 milliGRAM(s) Oral every 6 hours PRN Temp greater or equal to 38C (100.4F)  acetaminophen   Tablet .. 650 milliGRAM(s) Oral every 6 hours PRN Temp greater or equal to 38.5C (101.3F)      Xrays:    TLC okay , OG okay .ET high                                                                                 ECHO not done Patient is a 39y old  Male who presents with a chief complaint of Acute hypoxemic respiratory failure (06 Apr 2020 19:21)        Over Night Events: Remains Critically ill and on MV , Febrile overnight . On levophed and Sedation     ROS:  See HPI    PHYSICAL EXAM    ICU Vital Signs Last 24 Hrs  T(C): 38.1 (07 Apr 2020 06:00), Max: 38.7 (06 Apr 2020 14:29)  T(F): 100.6 (07 Apr 2020 06:00), Max: 101.6 (06 Apr 2020 14:29)  HR: 66 (07 Apr 2020 06:00) (66 - 127)  BP: 95/51 (07 Apr 2020 06:00) (91/55 - 111/56)  BP(mean): 67 (07 Apr 2020 04:00) (67 - 80)  ABP: --  ABP(mean): --  RR: 34 (07 Apr 2020 06:00) (20 - 38)  SpO2: 97% (07 Apr 2020 06:00) (88% - 99%)      General: Intubated and sedated  HEENT: TIFFANIE  , ET+           Lungs: Bilateral crackles+  Cardiovascular: Regular   Gastrointestinal: Soft, Positive BS  Extremities: No clubbing.  Moves extremities.  Full Range of motion   Skin: Warm, intact  Neurological: sedated, moves all extremities       04-06-20 @ 07:01  -  04-07-20 @ 07:00  --------------------------------------------------------  IN:    dexmedetomidine Infusion: 177.1 mL    Enteral Tube Flush: 220 mL    heparin Infusion: 17 mL    IV PiggyBack: 100 mL    midazolam Infusion: 63.6 mL    morphine  Infusion.: 23 mL    norepinephrine Infusion: 348.9 mL    Peptamen A.F.: 1320 mL    propofol Infusion: 37.8 mL  Total IN: 2307.4 mL    OUT:    Indwelling Catheter - Urethral: 1655 mL  Total OUT: 1655 mL    Total NET: 652.4 mL          LABS:                            12.6   11.98 )-----------( 250      ( 07 Apr 2020 06:12 )             41.6                                               04-07               12.6   11.98 )-----------( 250      ( 04-07 @ 06:12 )             41.6                12.2   7.61  )-----------( 261      ( 04-06 @ 04:07 )             39.6                12.8   5.71  )-----------( 339      ( 04-05 @ 05:27 )             41.8       143  |  101  |  18  ----------------------------<  146<H>  5.6<H>   |  32  |  0.8    07 Apr 2020 06:12    143    |  101    |  18     ----------------------------<  146<H>  5.6<H>   |  32     |  0.8    06 Apr 2020 04:07    143    |  104    |  21<H>  ----------------------------<  133<H>  4.9     |  30     |  0.9      Ca    8.5        07 Apr 2020 06:12  Ca    8.3<L>      06 Apr 2020 04:07  Mg     2.2       07 Apr 2020 06:12  Mg     2.2       06 Apr 2020 04:07    TPro  6.2    /  Alb  2.9<L>  /  TBili  0.7    /  DBili  x      /  AST  38     /  ALT  51<H>  /  AlkPhos  179<H>  07 Apr 2020 06:12  TPro  5.5<L>  /  Alb  2.7<L>  /  TBili  0.8    /  DBili  x      /  AST  64<H>  /  ALT  68<H>  /  AlkPhos  192<H>  06 Apr 2020 04:07    Ca    8.5      07 Apr 2020 06:12  Mg     2.2     04-07    TPro  6.2  /  Alb  2.9<L>  /  TBili  0.7  /  DBili  x   /  AST  38  /  ALT  51<H>  /  AlkPhos  179<H>  04-07      PTT - ( 06 Apr 2020 04:07 )  PTT:31.4 sec                                                                                     LIVER FUNCTIONS - ( 07 Apr 2020 06:12 )  Alb: 2.9 g/dL / Pro: 6.2 g/dL / ALK PHOS: 179 U/L / ALT: 51 U/L / AST: 38 U/L / GGT: x                                                  Culture - Blood (collected 05 Apr 2020 21:40)  Source: .Blood Blood  Preliminary Report (07 Apr 2020 07:01):    No growth to date.                                                   Mode: AC/ CMV (Assist Control/ Continuous Mandatory Ventilation)  RR (machine): 22  FiO2: 100  PEEP: 12  PC: 25  PIP: 25                                      ABG - ( 07 Apr 2020 02:58 )  pH, Arterial: 7.12  pH, Blood: x     /  pCO2: 111   /  pO2: 101   / HCO3: 36    / Base Excess: 2.9   /  SaO2: 96          MEDICATIONS  (STANDING):  cefepime   IVPB 1000 milliGRAM(s) IV Intermittent every 8 hours  chlorhexidine 0.12% Liquid 15 milliLiter(s) Oral Mucosa two times a day  chlorhexidine 4% Liquid 1 Application(s) Topical two times a day  chlorhexidine 4% Liquid 1 Application(s) Topical <User Schedule>  dexMEDEtomidine Infusion 0.2 MICROgram(s)/kG/Hr (4.89 mL/Hr) IV Continuous <Continuous>  heparin  Injectable 5000 Unit(s) SubCutaneous every 8 hours  midazolam Infusion 0.02 mG/kG/Hr (1.96 mL/Hr) IV Continuous <Continuous>  morphine  Infusion. 1 mG/Hr (1 mL/Hr) IV Continuous <Continuous>  norepinephrine Infusion 0.05 MICROgram(s)/kG/Min (4.22 mL/Hr) IV Continuous <Continuous>  pantoprazole   Suspension 40 milliGRAM(s) Oral daily  propofol Infusion 5 MICROgram(s)/kG/Min (2.93 mL/Hr) IV Continuous <Continuous>  zinc sulfate 220 milliGRAM(s) Oral daily    MEDICATIONS  (PRN):  acetaminophen    Suspension .. 650 milliGRAM(s) Oral every 6 hours PRN Temp greater or equal to 38C (100.4F)  acetaminophen   Tablet .. 650 milliGRAM(s) Oral every 6 hours PRN Temp greater or equal to 38.5C (101.3F)      Xrays:    TLC okay , OG okay .ET high                                                                                 ECHO not done    < from: [facility] Dup[facility] Lower Ext Vein Scan, Bilat (04.01.20 @ 11:46) >  No evidence of deep venous thrombosis or superficial thrombophlebitis in the bilateral lower extremities.    ICD-10:M79.89    < end of copied text >

## 2020-04-07 NOTE — PROGRESS NOTE ADULT - ASSESSMENT
Patient is a 39 M w/ PMH of Development delay / Nonverbal / from a group home brought in on 3/23 w/ fever, developed acute RS failure and intubated due to COVID illness     #Acute Hypoxic Respiratory Failure with ARDS and sepsis due to COVID 19 Pneumonia  - sedation with Precedex and morphine give LR bolus, watch HR   - on MV, adjust setting, TV to 500, switch to vent (currently pt attached to BiPAP)   - off heparin Gtt, d-dimer improved   - empirical Cefepime D5/7  - s/p Plaquenil and tocilizumab      Diet- Tube Feeds  DVT ppx: heparin  GI ppx: protonix  Activity: bed rest  Dispo: Acute -   Spoke with Brother - Anesthesiologist - Lamberto Rowell [phone]  FULL CODE Patient is a 39 M w/ PMH of Development delay / Nonverbal / from a group home brought in on 3/23 w/ fever, developed acute RS failure and intubated due to COVID illness     #Acute Hypoxic Respiratory Failure with ARDS and sepsis due to COVID 19 Pneumonia  - sedation with Versed and morphine give LR bolus, watch HR   - on MV, adjust setting, TV to 500, switch to vent (currently pt attached to BiPAP)   - off heparin Gtt, d-dimer improved   - empirical Cefepime D5/7  - s/p Plaquenil and tocilizumab      Diet- Tube Feeds  DVT ppx: heparin  GI ppx: protonix  Activity: bed rest  Dispo: Acute -   Spoke with Brother - Anesthesiologist - Lamberto Rowell [phone]  FULL CODE Patient is a 39 M w/ PMH of Development delay / Nonverbal / from a group home brought in on 3/23 w/ fever, developed acute RS failure and intubated due to COVID illness     #Acute Hypoxic Respiratory Failure with ARDS and sepsis due to COVID 19 Pneumonia  - sedation with Versed and morphine give LR bolus, watch HR   - on MV, adjust setting, TV to 500, switch to vent (currently pt attached to BiPAP)   - off heparin Gtt, d-dimer improved   - empirical Cefepime D5/7  - s/p Plaquenil and tocilizumab      #Hyper K:  -insulin, D10 and Kayexalate, repeat level     Diet- Tube Feeds  DVT ppx: heparin  GI ppx: protonix  Activity: bed rest  Dispo: Acute -   Spoke with Brother - Anesthesiologist - Lamberto Rowell [phone]  FULL CODE

## 2020-04-08 VITALS — HEART RATE: 34 BPM | OXYGEN SATURATION: 80 % | DIASTOLIC BLOOD PRESSURE: 30 MMHG

## 2020-04-08 LAB
ALBUMIN SERPL ELPH-MCNC: 2.8 G/DL — LOW (ref 3.5–5.2)
ALP SERPL-CCNC: 184 U/L — HIGH (ref 30–115)
ALT FLD-CCNC: 71 U/L — HIGH (ref 0–41)
ANION GAP SERPL CALC-SCNC: 14 MMOL/L — SIGNIFICANT CHANGE UP (ref 7–14)
AST SERPL-CCNC: 84 U/L — HIGH (ref 0–41)
BASOPHILS # BLD AUTO: 0.06 K/UL — SIGNIFICANT CHANGE UP (ref 0–0.2)
BASOPHILS NFR BLD AUTO: 0.8 % — SIGNIFICANT CHANGE UP (ref 0–1)
BILIRUB SERPL-MCNC: 0.9 MG/DL — SIGNIFICANT CHANGE UP (ref 0.2–1.2)
BUN SERPL-MCNC: 37 MG/DL — HIGH (ref 10–20)
CALCIUM SERPL-MCNC: 7.8 MG/DL — LOW (ref 8.5–10.1)
CHLORIDE SERPL-SCNC: 102 MMOL/L — SIGNIFICANT CHANGE UP (ref 98–110)
CO2 SERPL-SCNC: 26 MMOL/L — SIGNIFICANT CHANGE UP (ref 17–32)
CREAT SERPL-MCNC: 1.6 MG/DL — HIGH (ref 0.7–1.5)
EOSINOPHIL # BLD AUTO: 0.05 K/UL — SIGNIFICANT CHANGE UP (ref 0–0.7)
EOSINOPHIL NFR BLD AUTO: 0.7 % — SIGNIFICANT CHANGE UP (ref 0–8)
GAS PNL BLDA: SIGNIFICANT CHANGE UP
GLUCOSE BLDC GLUCOMTR-MCNC: 96 MG/DL — SIGNIFICANT CHANGE UP (ref 70–99)
GLUCOSE SERPL-MCNC: 204 MG/DL — HIGH (ref 70–99)
HCT VFR BLD CALC: 40.3 % — LOW (ref 42–52)
HGB BLD-MCNC: 12.8 G/DL — LOW (ref 14–18)
IMM GRANULOCYTES NFR BLD AUTO: 2.1 % — HIGH (ref 0.1–0.3)
LYMPHOCYTES # BLD AUTO: 1.17 K/UL — LOW (ref 1.2–3.4)
LYMPHOCYTES # BLD AUTO: 16.3 % — LOW (ref 20.5–51.1)
MCHC RBC-ENTMCNC: 29.8 PG — SIGNIFICANT CHANGE UP (ref 27–31)
MCHC RBC-ENTMCNC: 31.8 G/DL — LOW (ref 32–37)
MCV RBC AUTO: 93.9 FL — SIGNIFICANT CHANGE UP (ref 80–94)
MONOCYTES # BLD AUTO: 0.58 K/UL — SIGNIFICANT CHANGE UP (ref 0.1–0.6)
MONOCYTES NFR BLD AUTO: 8.1 % — SIGNIFICANT CHANGE UP (ref 1.7–9.3)
NEUTROPHILS # BLD AUTO: 5.17 K/UL — SIGNIFICANT CHANGE UP (ref 1.4–6.5)
NEUTROPHILS NFR BLD AUTO: 72 % — SIGNIFICANT CHANGE UP (ref 42.2–75.2)
NRBC # BLD: 2 /100 WBCS — HIGH (ref 0–0)
PLATELET # BLD AUTO: 252 K/UL — SIGNIFICANT CHANGE UP (ref 130–400)
POTASSIUM SERPL-MCNC: 5.8 MMOL/L — HIGH (ref 3.5–5)
POTASSIUM SERPL-SCNC: 5.8 MMOL/L — HIGH (ref 3.5–5)
PROCALCITONIN SERPL-MCNC: 0.77 NG/ML — HIGH (ref 0.02–0.1)
PROT SERPL-MCNC: 5.8 G/DL — LOW (ref 6–8)
RBC # BLD: 4.29 M/UL — LOW (ref 4.7–6.1)
RBC # FLD: 18.1 % — HIGH (ref 11.5–14.5)
SODIUM SERPL-SCNC: 142 MMOL/L — SIGNIFICANT CHANGE UP (ref 135–146)
WBC # BLD: 7.18 K/UL — SIGNIFICANT CHANGE UP (ref 4.8–10.8)
WBC # FLD AUTO: 7.18 K/UL — SIGNIFICANT CHANGE UP (ref 4.8–10.8)

## 2020-04-08 PROCEDURE — 71045 X-RAY EXAM CHEST 1 VIEW: CPT | Mod: 26

## 2020-04-08 PROCEDURE — 99233 SBSQ HOSP IP/OBS HIGH 50: CPT

## 2020-04-08 PROCEDURE — 74018 RADEX ABDOMEN 1 VIEW: CPT | Mod: 26

## 2020-04-08 PROCEDURE — 71045 X-RAY EXAM CHEST 1 VIEW: CPT | Mod: 26,77

## 2020-04-08 RX ORDER — CASPOFUNGIN ACETATE 7 MG/ML
70 INJECTION, POWDER, LYOPHILIZED, FOR SOLUTION INTRAVENOUS ONCE
Refills: 0 | Status: DISCONTINUED | OUTPATIENT
Start: 2020-04-08 | End: 2020-04-08

## 2020-04-08 RX ORDER — SODIUM CHLORIDE 9 MG/ML
500 INJECTION INTRAMUSCULAR; INTRAVENOUS; SUBCUTANEOUS ONCE
Refills: 0 | Status: DISCONTINUED | OUTPATIENT
Start: 2020-04-08 | End: 2020-04-08

## 2020-04-08 RX ORDER — SODIUM CHLORIDE 9 MG/ML
1000 INJECTION, SOLUTION INTRAVENOUS
Refills: 0 | Status: DISCONTINUED | OUTPATIENT
Start: 2020-04-08 | End: 2020-04-08

## 2020-04-08 RX ORDER — MEROPENEM 1 G/30ML
INJECTION INTRAVENOUS
Refills: 0 | Status: DISCONTINUED | OUTPATIENT
Start: 2020-04-08 | End: 2020-04-08

## 2020-04-08 RX ORDER — KETOROLAC TROMETHAMINE 30 MG/ML
15 SYRINGE (ML) INJECTION ONCE
Refills: 0 | Status: DISCONTINUED | OUTPATIENT
Start: 2020-04-08 | End: 2020-04-08

## 2020-04-08 RX ORDER — INSULIN HUMAN 100 [IU]/ML
10 INJECTION, SOLUTION SUBCUTANEOUS ONCE
Refills: 0 | Status: COMPLETED | OUTPATIENT
Start: 2020-04-08 | End: 2020-04-08

## 2020-04-08 RX ORDER — SODIUM CHLORIDE 9 MG/ML
500 INJECTION INTRAMUSCULAR; INTRAVENOUS; SUBCUTANEOUS ONCE
Refills: 0 | Status: COMPLETED | OUTPATIENT
Start: 2020-04-08 | End: 2020-04-08

## 2020-04-08 RX ORDER — DEXTROSE 10 % IN WATER 10 %
250 INTRAVENOUS SOLUTION INTRAVENOUS ONCE
Refills: 0 | Status: COMPLETED | OUTPATIENT
Start: 2020-04-08 | End: 2020-04-08

## 2020-04-08 RX ORDER — SODIUM POLYSTYRENE SULFONATE 4.1 MEQ/G
30 POWDER, FOR SUSPENSION ORAL ONCE
Refills: 0 | Status: COMPLETED | OUTPATIENT
Start: 2020-04-08 | End: 2020-04-08

## 2020-04-08 RX ORDER — PHENYLEPHRINE HYDROCHLORIDE 10 MG/ML
0.5 INJECTION INTRAVENOUS
Qty: 160 | Refills: 0 | Status: DISCONTINUED | OUTPATIENT
Start: 2020-04-08 | End: 2020-04-08

## 2020-04-08 RX ORDER — SODIUM CHLORIDE 9 MG/ML
500 INJECTION, SOLUTION INTRAVENOUS
Refills: 0 | Status: DISCONTINUED | OUTPATIENT
Start: 2020-04-08 | End: 2020-04-08

## 2020-04-08 RX ORDER — MEROPENEM 1 G/30ML
1000 INJECTION INTRAVENOUS ONCE
Refills: 0 | Status: COMPLETED | OUTPATIENT
Start: 2020-04-08 | End: 2020-04-08

## 2020-04-08 RX ORDER — VASOPRESSIN 20 [USP'U]/ML
0.02 INJECTION INTRAVENOUS
Qty: 50 | Refills: 0 | Status: DISCONTINUED | OUTPATIENT
Start: 2020-04-08 | End: 2020-04-08

## 2020-04-08 RX ORDER — CASPOFUNGIN ACETATE 7 MG/ML
50 INJECTION, POWDER, LYOPHILIZED, FOR SOLUTION INTRAVENOUS EVERY 24 HOURS
Refills: 0 | Status: DISCONTINUED | OUTPATIENT
Start: 2020-04-09 | End: 2020-04-08

## 2020-04-08 RX ORDER — CASPOFUNGIN ACETATE 7 MG/ML
INJECTION, POWDER, LYOPHILIZED, FOR SOLUTION INTRAVENOUS
Refills: 0 | Status: DISCONTINUED | OUTPATIENT
Start: 2020-04-08 | End: 2020-04-08

## 2020-04-08 RX ORDER — MEROPENEM 1 G/30ML
1000 INJECTION INTRAVENOUS EVERY 12 HOURS
Refills: 0 | Status: DISCONTINUED | OUTPATIENT
Start: 2020-04-08 | End: 2020-04-08

## 2020-04-08 RX ADMIN — ZINC SULFATE TAB 220 MG (50 MG ZINC EQUIVALENT) 220 MILLIGRAM(S): 220 (50 ZN) TAB at 09:14

## 2020-04-08 RX ADMIN — Medication 650 MILLIGRAM(S): at 10:15

## 2020-04-08 RX ADMIN — MEROPENEM 100 MILLIGRAM(S): 1 INJECTION INTRAVENOUS at 10:29

## 2020-04-08 RX ADMIN — Medication 15 MILLIGRAM(S): at 01:28

## 2020-04-08 RX ADMIN — SODIUM CHLORIDE 1000 MILLILITER(S): 9 INJECTION INTRAMUSCULAR; INTRAVENOUS; SUBCUTANEOUS at 06:47

## 2020-04-08 RX ADMIN — CHLORHEXIDINE GLUCONATE 1 APPLICATION(S): 213 SOLUTION TOPICAL at 04:49

## 2020-04-08 RX ADMIN — Medication 125 MILLIGRAM(S): at 10:15

## 2020-04-08 RX ADMIN — CEFEPIME 100 MILLIGRAM(S): 1 INJECTION, POWDER, FOR SOLUTION INTRAMUSCULAR; INTRAVENOUS at 04:49

## 2020-04-08 RX ADMIN — Medication 500 MILLILITER(S): at 09:58

## 2020-04-08 RX ADMIN — SODIUM POLYSTYRENE SULFONATE 30 GRAM(S): 4.1 POWDER, FOR SUSPENSION ORAL at 11:30

## 2020-04-08 RX ADMIN — SODIUM CHLORIDE 1000 MILLILITER(S): 9 INJECTION, SOLUTION INTRAVENOUS at 09:49

## 2020-04-08 RX ADMIN — CHLORHEXIDINE GLUCONATE 15 MILLILITER(S): 213 SOLUTION TOPICAL at 04:48

## 2020-04-08 RX ADMIN — INSULIN HUMAN 10 UNIT(S): 100 INJECTION, SOLUTION SUBCUTANEOUS at 09:58

## 2020-04-08 RX ADMIN — VASOPRESSIN 1.2 UNIT(S)/MIN: 20 INJECTION INTRAVENOUS at 06:09

## 2020-04-08 RX ADMIN — PANTOPRAZOLE SODIUM 40 MILLIGRAM(S): 20 TABLET, DELAYED RELEASE ORAL at 09:13

## 2020-04-08 RX ADMIN — HEPARIN SODIUM 5000 UNIT(S): 5000 INJECTION INTRAVENOUS; SUBCUTANEOUS at 04:49

## 2020-04-08 NOTE — PROGRESS NOTE ADULT - ASSESSMENT
Patient is a 39 M w/ PMH of Down syndrome, Development delay / Nonverbal / from a group home brought in on 3/23 w/ fever, developed acute RS failure and intubated due to COVID illness     #Acute Hypoxic Respiratory Failure with ARDS and severe sepsis due to COVID 19 Pneumonia  - now hypotensive despite 3 pressors, likely to worsen sepsis or superimposed bacterial infection  - give methylprednisolone 125 mg stat then 60mg BID  - add Meropenem, f/u fungitell   - prognosis is extremely poor, spoke to his brother, remains full code   - on MV, settings adjusted   - send inflammatory markers   - s/p Plaquenil and tocilizumab      #Abd distenstion with elevated lactate:  -KUB    #Hyper K:  -insulin, D10 and Kayexalate, repeat level     Diet- Tube Feeds  DVT ppx: heparin  GI ppx: protonix  Activity: bed rest  Dispo: Acute -   Spoke with Brother - Anesthesiologist - Lamberto Rowell [phone]  FULL CODE   Prognosis extremely poor Patient is a 39 M w/ PMH of Down syndrome, Development delay / Nonverbal / from a group home brought in on 3/23 w/ fever, developed acute RS failure and intubated due to COVID illness     #Acute Hypoxic Respiratory Failure with ARDS and severe sepsis due to COVID 19 Pneumonia  - now hypotensive despite 3 pressors, likely to CYtokine release syndrome, worsen sepsis or superimposed bacterial infection  - start Caspofungin , consider IL-6 blocker  - give methylprednisolone 125 mg stat then 60mg BID  - add Meropenem, f/u fungitell   - prognosis is extremely poor, spoke to his brother, remains full code   - on MV, settings adjusted   - send inflammatory markers   - s/p Plaquenil and tocilizumab      #Abd distenstion with elevated lactate:  -KUB    #Hyper K:  -insulin, D10 and Kayexalate, repeat level     Diet- Tube Feeds  DVT ppx: heparin  GI ppx: protonix  Activity: bed rest  Dispo: Acute -   Spoke with Brother - Anesthesiologist - Lamberto Rowell [phone]  FULL CODE   Prognosis extremely poor

## 2020-04-08 NOTE — PROGRESS NOTE ADULT - ASSESSMENT
IMPRESSION:    Acute Hypoxemic Respiratory Failure with Hypercapnia  s/p Intubation  COVID19 PNA s/p plaquenil and Tociluzimab  Possible superinfection   Sepsis  Septic shock  transaminitis  HO Developmental delay from group home  [place]      PLAN:    CNS: sedation with morphine and versed    HEENT: Oral care; ET care    PULMONARY:  HOB @ 45 degrees. Aspiration Precautions .  Push ETT 2 cm  and repeat CXR Vent changes : ARDS protocol .Dec fio2 70 % dec PEEP 18  .  Repeat ABG PM. Monitor PPL and Driving pressure    CARDIOVASCULAR: Avoid volume overload . Keep I=O, wean levophed, c/w vasopressin . Repeat d-dimer, CRP    GI: GI prophylaxis.  Feeding OG feeding    RENAL:  Follow up lytes.  Correct as needed. Low Potassium diet     INFECTIOUS DISEASE: Repeat cultures. Deep ETT culture.  cefepime switch to meropenem  . s/p One dose of vancomycin IV x 1 F/u ID     HEMATOLOGICAL:  DVT prophylaxis    ENDOCRINE:  Follow up FS.  Insulin protocol if needed    MUSCULOSKELETAL: Bedrest    Kuhn: Yes  Lines: TLC right   Code status: Full code  Poor prognosis  Disposition: MICU monitoring   please see attestation note IMPRESSION:    Acute Hypoxemic Respiratory Failure with Hypercapnia  s/p Intubation  COVID19 PNA s/p plaquenil and Tociluzimab  Possible superinfection   Sepsis  Septic shock  transaminitis  HO Developmental delay from group home  [place]      PLAN:    CNS: sedation with morphine and versed    HEENT: Oral care; ET care    PULMONARY:  HOB @ 45 degrees. Aspiration Precautions .  Push ETT 2 cm  and repeat CXR Vent changes : ARDS protocol .Dec fio2 70 % dec PEEP 18  .  Repeat ABG PM. Monitor PPL and Driving pressure . Solumedrol 40 q12    CARDIOVASCULAR: Avoid volume overload . Keep I=O, wean levophed, c/w vasopressin . Repeat d-dimer, CRP    GI: GI prophylaxis.  Feeding OG feeding    RENAL:  Follow up lytes.  Correct as needed. Low Potassium diet     INFECTIOUS DISEASE: Repeat cultures. Deep ETT culture.  cefepime switch to meropenem  . s/p One dose of vancomycin IV x 1 F/u ID     HEMATOLOGICAL:  DVT prophylaxis    ENDOCRINE:  Follow up FS.  Insulin protocol if needed    MUSCULOSKELETAL: Bedrest    Kuhn: Yes  Lines: TLC right   Code status: Full code  Poor prognosis  Disposition: MICU monitoring   please see attestation note

## 2020-04-08 NOTE — PROGRESS NOTE ADULT - SUBJECTIVE AND OBJECTIVE BOX
Patient is a 39y old  Male with Down syndrome who presents with a chief complaint of Acute hypoxemic respiratory failure (08 Apr 2020 07:56)      OVERNIGHT EVENTS: remaiend intubated and sedated, fever overnight not responding to Tylenol hypotension required 3 pressors     SUBJECTIVE / INTERVAL HPI: Patient seen and examined at bedside.     VITAL SIGNS:  Vital Signs Last 24 Hrs  T(C): 40.6 (08 Apr 2020 10:00), Max: 41.3 (07 Apr 2020 20:32)  T(F): 105 (08 Apr 2020 10:00), Max: 106.3 (07 Apr 2020 20:32)  HR: 134 (08 Apr 2020 10:00) (111 - 163)  BP: 64/40 (08 Apr 2020 10:00) (64/40 - 114/56)  BP(mean): 69 (07 Apr 2020 16:30) (66 - 69)  RR: 20 (08 Apr 2020 08:00) (20 - 32)  SpO2: 97% (08 Apr 2020 09:45) (79% - 100%)    PHYSICAL EXAM:      HEENT: NC/AT; PERRL, clear conjunctiva  Neck: supple  Cardiovascular: +S1/S2; RRR  Respiratory: CTA b/l; no W/R/R  Gastrointestinal: soft, NT/ND; +BSx4  Extremities: WWP; 2+ peripheral pulses; no edema   Neurological: sedated    MEDICATIONS:  MEDICATIONS  (STANDING):  chlorhexidine 0.12% Liquid 15 milliLiter(s) Oral Mucosa two times a day  chlorhexidine 4% Liquid 1 Application(s) Topical two times a day  chlorhexidine 4% Liquid 1 Application(s) Topical <User Schedule>  dexMEDEtomidine Infusion 0.2 MICROgram(s)/kG/Hr (4.89 mL/Hr) IV Continuous <Continuous>  dextrose 10%. 250 milliLiter(s) (250 mL/Hr) IV Continuous <Continuous>  heparin  Injectable 5000 Unit(s) SubCutaneous every 8 hours  lactated ringers. 500 milliLiter(s) (1000 mL/Hr) IV Continuous <Continuous>  lactated ringers. 1000 milliLiter(s) (1000 mL/Hr) IV Continuous <Continuous>  meropenem  IVPB 1000 milliGRAM(s) IV Intermittent every 12 hours  meropenem  IVPB      methylPREDNISolone sodium succinate Injectable 60 milliGRAM(s) IV Push every 12 hours  midazolam Infusion 0.02 mG/kG/Hr (1.96 mL/Hr) IV Continuous <Continuous>  morphine  Infusion. 1 mG/Hr (1 mL/Hr) IV Continuous <Continuous>  norepinephrine Infusion 0.05 MICROgram(s)/kG/Min (4.22 mL/Hr) IV Continuous <Continuous>  pantoprazole   Suspension 40 milliGRAM(s) Oral daily  phenylephrine    Infusion 0.5 MICROgram(s)/kG/Min (9.17 mL/Hr) IV Continuous <Continuous>  sodium polystyrene sulfonate Suspension 30 Gram(s) Oral once  vasopressin Infusion 0.02 Unit(s)/Min (1.2 mL/Hr) IV Continuous <Continuous>  zinc sulfate 220 milliGRAM(s) Oral daily    MEDICATIONS  (PRN):  acetaminophen    Suspension .. 650 milliGRAM(s) Oral every 6 hours PRN Temp greater or equal to 38C (100.4F)  acetaminophen   Tablet .. 650 milliGRAM(s) Oral every 6 hours PRN Temp greater or equal to 38.5C (101.3F)      ALLERGIES:  Allergies    No Known Allergies    Intolerances        LABS:                        12.8   7.18  )-----------( 252      ( 08 Apr 2020 04:30 )             40.3     04-08    142  |  102  |  37<H>  ----------------------------<  204<H>  5.8<H>   |  26  |  1.6<H>    Ca    7.8<L>      08 Apr 2020 04:30  Mg     2.2     04-07    TPro  5.8<L>  /  Alb  2.8<L>  /  TBili  0.9  /  DBili  x   /  AST  84<H>  /  ALT  71<H>  /  AlkPhos  184<H>  04-08        CAPILLARY BLOOD GLUCOSE      POCT Blood Glucose.: 189 mg/dL (07 Apr 2020 14:36)      MRSA PCR Result.: Negative: By: Real-Time PCR (Polymerase Reaction Method) (04.07.20 @ 17:50)    Culture - Sputum . (04.07.20 @ 17:45)    Gram Stain:   Numerous polymorphonuclear leukocytes per low power field  No squamous epithelial cells per low power field  No organisms seen    Specimen Source: .Sputum Sputum    Procalcitonin, Serum: 0.77: Procalcitonin (PCT) Interpretation (ng/mL) - Diagnosis of systemic  bacterial infection/sepsis  PCT < 0.5: Systemic infection (sepsis) is not likely and risk for  progression to severe systemic infection is low. Local bacterial  infection is possible. If early sepsis is suspected clinically, PCT  should be re-assessed in 6-24 hours.  PCT >/= 0.5 but < 2.0: Systemic infection (sepsis) is possible, but other  conditions are known to elevate PCT as well. Moderate risk for  progression to severe systemic infection. The patient should be closely  monitored both clinically and by re-assessing PCT within 6-24 hours.  PCT >/= 2.0 but < 10.0: Systemic infection (sepsis) is likely, unless  other causes are known. High risk of progression to severe systemic  infection (severe sepsis/septic shock).  PCT >/= 10.0: Important systemic inflammatory response, almost  exclusively due to severe bacterial sepsis or septic shock. High  likelihood of severe sepsis or septic shock. ng/mL (04.07.20 @ 16:18)    Blood Gas Arterial, Lactate: 1.7 mmoL/L (04.08.20 @ 04:30)

## 2020-04-08 NOTE — PROGRESS NOTE ADULT - PROVIDER SPECIALTY LIST ADULT
Critical Care
Hospitalist
Infectious Disease
Internal Medicine
Internal Medicine
Pulmonology
Critical Care

## 2020-04-08 NOTE — PROGRESS NOTE ADULT - ATTENDING COMMENTS
Attending Statement: I have personally performed a face to face diagnostic evaluation on this patient. The patient is suffering from respiratory failure from COVID -19.   I have reviewed the above note and agree with the history, exam and suggestions for care, except as I have noted in the text.
patient seen and examined agree above note    lower peep to 18 and then if pox remain more then 94% lower to 16   before   ABG afternoon   taper pressors   KUB   change abx to merop follow cx   guarded prognosis
patient seen and examined agree above note   lower setting to 20/10   hold sedation for possible weaning trial  taper pressors
patient seen and examined agree above note   on V60 patient not pulling good Tv will change vent and will go from pressure control to PRVC now we increase EPAP to 40 pullinf 430 TV was 260 before   ABG in one hour to check the co2   hypercapnea   taper pressors  on sedation   send DTA for cx   bld cx
Attending Statement: I have personally performed a face to face diagnostic evaluation on this patient. The patient is suffering from respiratory failure from COVID -19.   I have reviewed the above note and agree with the history, exam and suggestions for care, except as I have noted in the text.

## 2020-04-08 NOTE — PROGRESS NOTE ADULT - REASON FOR ADMISSION
Acute hypoxemic respiratory failure

## 2020-04-08 NOTE — DISCHARGE NOTE FOR THE EXPIRED PATIENT - HOSPITAL COURSE
40 yo M with PMHx of developmental delay (nonverbal at baseline), HLD who presents from [facility] for fever and SOB, pt was in resp distress and suspicious of COVID -19,  pt was admitted and intubated due to RS failure, COVID came positive and pt stayed in he ICU, pt developed cytokine shock with severe sepsis, required 3 pressors,  today he became pulseless and was pronounced death on 4/8/2020 at 11:54 am

## 2020-04-08 NOTE — PROGRESS NOTE ADULT - SUBJECTIVE AND OBJECTIVE BOX
Patient is a 39y old  Male who presents with a chief complaint of Acute hypoxemic respiratory failure (07 Apr 2020 10:14)        Over Night Events: Remains critically ill and on MV, On levophed and vasopressin . Febrile overnight        ROS:  See HPI    PHYSICAL EXAM    ICU Vital Signs Last 24 Hrs  T(C): 40.3 (08 Apr 2020 06:32), Max: 41.3 (07 Apr 2020 20:32)  T(F): 104.6 (08 Apr 2020 06:32), Max: 106.3 (07 Apr 2020 20:32)  HR: 148 (08 Apr 2020 07:23) (111 - 163)  BP: 82/45 (08 Apr 2020 07:23) (65/34 - 133/60)  BP(mean): 69 (07 Apr 2020 16:30) (66 - 69)  ABP: 84/45 (08 Apr 2020 07:23) (84/45 - 86/44)  ABP(mean): --  RR: 20 (08 Apr 2020 07:23) (20 - 32)  SpO2: 100% (08 Apr 2020 07:23) (79% - 100%)      General: Intubated and sedated  HEENT: TIFFANIE    ,ET+          Lungs: Bilateral crackles  Cardiovascular: Regular   Gastrointestinal: Soft, Positive BS  Extremities: No clubbing.  Moves extremities.  Full Range of motion   Skin: Warm, intact  Neurological: Sedated , moves all extremities      04-07-20 @ 07:01  -  04-08-20 @ 07:00  --------------------------------------------------------  IN:    dexmedetomidine Infusion: 68.4 mL    Enteral Tube Flush: 420 mL    IV PiggyBack: 300 mL    lactated ringers.: 1000 mL    midazolam Infusion: 7.8 mL    midazolam Infusion: 27.8 mL    morphine  Infusion.: 16 mL    norepinephrine Infusion: 223.2 mL    Peptamen A.F.: 240 mL  Total IN: 2303.2 mL    OUT:    Indwelling Catheter - Urethral: 452 mL  Total OUT: 452 mL    Total NET: 1851.2 mL          LABS:                            12.8   7.18  )-----------( 252      ( 08 Apr 2020 04:30 )             40.3                                               04-08               12.8   7.18  )-----------( 252      ( 04-08 @ 04:30 )             40.3                12.6   11.98 )-----------( 250      ( 04-07 @ 06:12 )             41.6                12.2   7.61  )-----------( 261      ( 04-06 @ 04:07 )             39.6       142  |  102  |  37<H>  ----------------------------<  204<H>  5.8<H>   |  26  |  1.6<H>    08 Apr 2020 04:30    142    |  102    |  37<H>  ----------------------------<  204<H>  5.8<H>   |  26     |  1.6<H>  07 Apr 2020 18:45    143    |  99     |  24<H>  ----------------------------<  139<H>  4.7     |  31     |  1.0      Ca    7.8<L>      08 Apr 2020 04:30  Ca    8.4<L>      07 Apr 2020 18:45  Mg     2.2       07 Apr 2020 06:12    TPro  5.8<L>  /  Alb  2.8<L>  /  TBili  0.9    /  DBili  x      /  AST  84<H>  /  ALT  71<H>  /  AlkPhos  184<H>  08 Apr 2020 04:30  TPro  5.8<L>  /  Alb  3.0<L>  /  TBili  0.8    /  DBili  x      /  AST  77<H>  /  ALT  73<H>  /  AlkPhos  200<H>  07 Apr 2020 18:45    Ca    7.8<L>      08 Apr 2020 04:30  Mg     2.2     04-07    TPro  5.8<L>  /  Alb  2.8<L>  /  TBili  0.9  /  DBili  x   /  AST  84<H>  /  ALT  71<H>  /  AlkPhos  184<H>  04-08                                                                                           LIVER FUNCTIONS - ( 08 Apr 2020 04:30 )  Alb: 2.8 g/dL / Pro: 5.8 g/dL / ALK PHOS: 184 U/L / ALT: 71 U/L / AST: 84 U/L / GGT: x                                                  Culture - Sputum (collected 07 Apr 2020 17:45)  Source: .Sputum Sputum  Gram Stain (07 Apr 2020 22:47):    Numerous polymorphonuclear leukocytes per low power field    No squamous epithelial cells per low power field    No organisms seen    Culture - Blood (collected 05 Apr 2020 21:40)  Source: .Blood Blood  Preliminary Report (07 Apr 2020 07:01):    No growth to date.                                                   Mode: AC/ CMV (Assist Control/ Continuous Mandatory Ventilation)  RR (machine): 28  TV (machine): 380  FiO2: 100  PEEP: 20  ITime: 1  MAP: 20  PIP: 28                                      ABG - ( 08 Apr 2020 04:30 )  pH, Arterial: 7.34  pH, Blood: x     /  pCO2: 56    /  pO2: 261   / HCO3: 30    / Base Excess: 3.0   /  SaO2: 100                 MEDICATIONS  (STANDING):  cefepime   IVPB 1000 milliGRAM(s) IV Intermittent every 8 hours  chlorhexidine 0.12% Liquid 15 milliLiter(s) Oral Mucosa two times a day  chlorhexidine 4% Liquid 1 Application(s) Topical two times a day  chlorhexidine 4% Liquid 1 Application(s) Topical <User Schedule>  dexMEDEtomidine Infusion 0.2 MICROgram(s)/kG/Hr (4.89 mL/Hr) IV Continuous <Continuous>  dextrose 10%. 250 milliLiter(s) (250 mL/Hr) IV Continuous <Continuous>  heparin  Injectable 5000 Unit(s) SubCutaneous every 8 hours  lactated ringers. 500 milliLiter(s) (1000 mL/Hr) IV Continuous <Continuous>  midazolam Infusion 0.02 mG/kG/Hr (1.96 mL/Hr) IV Continuous <Continuous>  morphine  Infusion. 1 mG/Hr (1 mL/Hr) IV Continuous <Continuous>  norepinephrine Infusion 0.05 MICROgram(s)/kG/Min (4.22 mL/Hr) IV Continuous <Continuous>  pantoprazole   Suspension 40 milliGRAM(s) Oral daily  vasopressin Infusion 0.02 Unit(s)/Min (1.2 mL/Hr) IV Continuous <Continuous>  zinc sulfate 220 milliGRAM(s) Oral daily    MEDICATIONS  (PRN):  acetaminophen    Suspension .. 650 milliGRAM(s) Oral every 6 hours PRN Temp greater or equal to 38C (100.4F)  acetaminophen   Tablet .. 650 milliGRAM(s) Oral every 6 hours PRN Temp greater or equal to 38.5C (101.3F)      Xrays:     ET high, TLC okay , OG okay                                                                                ECHO not done Patient is a 39y old  Male who presents with a chief complaint of Acute hypoxemic respiratory failure (07 Apr 2020 10:14)        Over Night Events: Remains critically ill and on MV, On levophed and vasopressin . Febrile overnight        ROS:  See HPI    PHYSICAL EXAM    ICU Vital Signs Last 24 Hrs  T(C): 40.3 (08 Apr 2020 06:32), Max: 41.3 (07 Apr 2020 20:32)  T(F): 104.6 (08 Apr 2020 06:32), Max: 106.3 (07 Apr 2020 20:32)  HR: 148 (08 Apr 2020 07:23) (111 - 163)  BP: 82/45 (08 Apr 2020 07:23) (65/34 - 133/60)  BP(mean): 69 (07 Apr 2020 16:30) (66 - 69)  ABP: 84/45 (08 Apr 2020 07:23) (84/45 - 86/44)  ABP(mean): --  RR: 20 (08 Apr 2020 07:23) (20 - 32)  SpO2: 100% (08 Apr 2020 07:23) (79% - 100%)      General: Intubated and sedated  HEENT: TIFFANIE    ,ET+          Lungs: Bilateral crackles  Cardiovascular: Regular   Gastrointestinal: Soft, Positive BS mild distension   Extremities: No clubbing.  Moves extremities.  Full Range of motion   Skin: Warm, intact  Neurological: Sedated , moves all extremities      04-07-20 @ 07:01  -  04-08-20 @ 07:00  --------------------------------------------------------  IN:    dexmedetomidine Infusion: 68.4 mL    Enteral Tube Flush: 420 mL    IV PiggyBack: 300 mL    lactated ringers.: 1000 mL    midazolam Infusion: 7.8 mL    midazolam Infusion: 27.8 mL    morphine  Infusion.: 16 mL    norepinephrine Infusion: 223.2 mL    Peptamen A.F.: 240 mL  Total IN: 2303.2 mL    OUT:    Indwelling Catheter - Urethral: 452 mL  Total OUT: 452 mL    Total NET: 1851.2 mL          LABS:                            12.8   7.18  )-----------( 252      ( 08 Apr 2020 04:30 )             40.3                                               04-08               12.8   7.18  )-----------( 252      ( 04-08 @ 04:30 )             40.3                12.6   11.98 )-----------( 250      ( 04-07 @ 06:12 )             41.6                12.2   7.61  )-----------( 261      ( 04-06 @ 04:07 )             39.6       142  |  102  |  37<H>  ----------------------------<  204<H>  5.8<H>   |  26  |  1.6<H>    08 Apr 2020 04:30    142    |  102    |  37<H>  ----------------------------<  204<H>  5.8<H>   |  26     |  1.6<H>  07 Apr 2020 18:45    143    |  99     |  24<H>  ----------------------------<  139<H>  4.7     |  31     |  1.0      Ca    7.8<L>      08 Apr 2020 04:30  Ca    8.4<L>      07 Apr 2020 18:45  Mg     2.2       07 Apr 2020 06:12    TPro  5.8<L>  /  Alb  2.8<L>  /  TBili  0.9    /  DBili  x      /  AST  84<H>  /  ALT  71<H>  /  AlkPhos  184<H>  08 Apr 2020 04:30  TPro  5.8<L>  /  Alb  3.0<L>  /  TBili  0.8    /  DBili  x      /  AST  77<H>  /  ALT  73<H>  /  AlkPhos  200<H>  07 Apr 2020 18:45    Ca    7.8<L>      08 Apr 2020 04:30  Mg     2.2     04-07    TPro  5.8<L>  /  Alb  2.8<L>  /  TBili  0.9  /  DBili  x   /  AST  84<H>  /  ALT  71<H>  /  AlkPhos  184<H>  04-08                                                                                           LIVER FUNCTIONS - ( 08 Apr 2020 04:30 )  Alb: 2.8 g/dL / Pro: 5.8 g/dL / ALK PHOS: 184 U/L / ALT: 71 U/L / AST: 84 U/L / GGT: x                                                  Culture - Sputum (collected 07 Apr 2020 17:45)  Source: .Sputum Sputum  Gram Stain (07 Apr 2020 22:47):    Numerous polymorphonuclear leukocytes per low power field    No squamous epithelial cells per low power field    No organisms seen    Culture - Blood (collected 05 Apr 2020 21:40)  Source: .Blood Blood  Preliminary Report (07 Apr 2020 07:01):    No growth to date.                                                   Mode: AC/ CMV (Assist Control/ Continuous Mandatory Ventilation)  RR (machine): 28  TV (machine): 380  FiO2: 100  PEEP: 20  ITime: 1  MAP: 20  PIP: 28                                      ABG - ( 08 Apr 2020 04:30 )  pH, Arterial: 7.34  pH, Blood: x     /  pCO2: 56    /  pO2: 261   / HCO3: 30    / Base Excess: 3.0   /  SaO2: 100                 MEDICATIONS  (STANDING):  cefepime   IVPB 1000 milliGRAM(s) IV Intermittent every 8 hours  chlorhexidine 0.12% Liquid 15 milliLiter(s) Oral Mucosa two times a day  chlorhexidine 4% Liquid 1 Application(s) Topical two times a day  chlorhexidine 4% Liquid 1 Application(s) Topical <User Schedule>  dexMEDEtomidine Infusion 0.2 MICROgram(s)/kG/Hr (4.89 mL/Hr) IV Continuous <Continuous>  dextrose 10%. 250 milliLiter(s) (250 mL/Hr) IV Continuous <Continuous>  heparin  Injectable 5000 Unit(s) SubCutaneous every 8 hours  lactated ringers. 500 milliLiter(s) (1000 mL/Hr) IV Continuous <Continuous>  midazolam Infusion 0.02 mG/kG/Hr (1.96 mL/Hr) IV Continuous <Continuous>  morphine  Infusion. 1 mG/Hr (1 mL/Hr) IV Continuous <Continuous>  norepinephrine Infusion 0.05 MICROgram(s)/kG/Min (4.22 mL/Hr) IV Continuous <Continuous>  pantoprazole   Suspension 40 milliGRAM(s) Oral daily  vasopressin Infusion 0.02 Unit(s)/Min (1.2 mL/Hr) IV Continuous <Continuous>  zinc sulfate 220 milliGRAM(s) Oral daily    MEDICATIONS  (PRN):  acetaminophen    Suspension .. 650 milliGRAM(s) Oral every 6 hours PRN Temp greater or equal to 38C (100.4F)  acetaminophen   Tablet .. 650 milliGRAM(s) Oral every 6 hours PRN Temp greater or equal to 38.5C (101.3F)      Xrays:     ET high, TLC okay , OG okay                                                                                ECHO not done

## 2020-04-08 NOTE — CHART NOTE - NSCHARTNOTEFT_GEN_A_CORE
Communications Team, critical care unit  Called patient's brother Lamberto Morgan to give update on patient's clinical course   Patient's brother was informed that patient's renal function continues to be impaired , continues to have fevers and continues to require morphine and pressors. . Patient's brother inquired about the value of patient's Potassium, BUN/Cr , temperature and the current dose of his Levophed and vasopressin. Patient's brother was provided with this information. He reports that he continues to want patient to be FULL CODE and wants to be updated daily about the plan of care for the patient. Communications Team, critical care unit  Called patient's brother Lamberto Morgan ( [phone]) to give update on patient's clinical course   Patient's brother was informed that patient's renal function continues to be impaired , continues to have fevers and continues to require morphine and pressors. . Patient's brother inquired about the value of patient's Potassium, BUN/Cr , temperature and the current dose of his Levophed and vasopressin. Patient's brother was provided with this information. He reports that he continues to want patient to be FULL CODE and wants to be updated daily about the plan of care for the patient.

## 2020-04-09 LAB
CULTURE RESULTS: SIGNIFICANT CHANGE UP
FUNGITELL: <31 PG/ML — SIGNIFICANT CHANGE UP
SPECIMEN SOURCE: SIGNIFICANT CHANGE UP

## 2020-04-10 LAB
CULTURE RESULTS: SIGNIFICANT CHANGE UP
SPECIMEN SOURCE: SIGNIFICANT CHANGE UP

## 2020-04-12 LAB
CULTURE RESULTS: SIGNIFICANT CHANGE UP
SPECIMEN SOURCE: SIGNIFICANT CHANGE UP

## 2020-04-17 DIAGNOSIS — F79 UNSPECIFIED INTELLECTUAL DISABILITIES: ICD-10-CM

## 2020-04-17 DIAGNOSIS — R65.21 SEVERE SEPSIS WITH SEPTIC SHOCK: ICD-10-CM

## 2020-04-17 DIAGNOSIS — Q90.9 DOWN SYNDROME, UNSPECIFIED: ICD-10-CM

## 2020-04-17 DIAGNOSIS — J80 ACUTE RESPIRATORY DISTRESS SYNDROME: ICD-10-CM

## 2020-04-17 DIAGNOSIS — F80.89 OTHER DEVELOPMENTAL DISORDERS OF SPEECH AND LANGUAGE: ICD-10-CM

## 2020-04-17 DIAGNOSIS — A41.89 OTHER SPECIFIED SEPSIS: ICD-10-CM

## 2020-04-17 DIAGNOSIS — E78.5 HYPERLIPIDEMIA, UNSPECIFIED: ICD-10-CM

## 2020-04-17 DIAGNOSIS — J96.01 ACUTE RESPIRATORY FAILURE WITH HYPOXIA: ICD-10-CM

## 2020-04-17 DIAGNOSIS — I95.9 HYPOTENSION, UNSPECIFIED: ICD-10-CM

## 2020-04-17 DIAGNOSIS — U07.1 COVID-19: ICD-10-CM

## 2020-04-17 DIAGNOSIS — E87.5 HYPERKALEMIA: ICD-10-CM

## 2020-04-17 DIAGNOSIS — J12.89 OTHER VIRAL PNEUMONIA: ICD-10-CM

## 2020-04-17 DIAGNOSIS — R74.0 NONSPECIFIC ELEVATION OF LEVELS OF TRANSAMINASE AND LACTIC ACID DEHYDROGENASE [LDH]: ICD-10-CM

## 2020-04-17 DIAGNOSIS — T50.995A ADVERSE EFFECT OF OTHER DRUGS, MEDICAMENTS AND BIOLOGICAL SUBSTANCES, INITIAL ENCOUNTER: ICD-10-CM

## 2020-04-23 ENCOUNTER — APPOINTMENT (OUTPATIENT)
Dept: PODIATRY | Facility: HOSPITAL | Age: 40
End: 2020-04-23

## 2020-05-28 ENCOUNTER — APPOINTMENT (OUTPATIENT)
Dept: PEDIATRIC DEVELOPMENTAL SERVICES | Facility: HOSPITAL | Age: 40
End: 2020-05-28

## 2021-04-03 NOTE — H&P ADULT - ATTENDING COMMENTS
Pt seen,  agree w Resident's assessment.  Pt w developmental delay, from [facility] home. Pt now intubated for hypoxemic respiratory failure c/w COVID 19 infection.  Will start plaquenil and azithromycin.  continue cefepime, ID eval.  Pulm eval. admit to CCU Unknown

## 2025-07-07 NOTE — HEALTH RISK ASSESSMENT
Review of CSA done and  to continue to adhere to policy  CSA updated in EMR:        Orders:    Follow Up In Rheumatology; Future     [No falls in past year] : Patient reported no falls in the past year [Smoke Detector] : smoke detector [Carbon Monoxide Detector] : carbon monoxide detector [Safety elements used in home] : safety elements used in home [Seat Belt] :  uses seat belt [] : No [Guns at Home] : no guns at home [de-identified] : lives at group home [de-identified] : staff dependent [de-identified] : staff dependent